# Patient Record
Sex: FEMALE | Race: ASIAN | NOT HISPANIC OR LATINO | Employment: FULL TIME | URBAN - METROPOLITAN AREA
[De-identification: names, ages, dates, MRNs, and addresses within clinical notes are randomized per-mention and may not be internally consistent; named-entity substitution may affect disease eponyms.]

---

## 2018-04-13 LAB — PAP: NORMAL

## 2018-10-19 ENCOUNTER — TELEPHONE (OUTPATIENT)
Dept: OBGYN | Facility: CLINIC | Age: 28
End: 2018-10-19

## 2018-10-19 NOTE — TELEPHONE ENCOUNTER
Spoke to Hakeem who is pregnant with first pregnancy,using LMP of 9/20/18 about 4 wks GA today. She had some pink vaginal spots 2 days ago,nothing further.   Reassurance given and bleeding /pain precautions reviewed and when to call triage or when to go to ED. Also had questions about exercise and PNV. Pt indicated understanding and agreed with plan.

## 2018-11-16 ENCOUNTER — OFFICE VISIT (OUTPATIENT)
Dept: OBGYN | Facility: CLINIC | Age: 28
End: 2018-11-16
Attending: ADVANCED PRACTICE MIDWIFE
Payer: COMMERCIAL

## 2018-11-16 ENCOUNTER — RADIANT APPOINTMENT (OUTPATIENT)
Dept: ULTRASOUND IMAGING | Facility: CLINIC | Age: 28
End: 2018-11-16
Attending: ADVANCED PRACTICE MIDWIFE
Payer: COMMERCIAL

## 2018-11-16 VITALS
WEIGHT: 138.7 LBS | DIASTOLIC BLOOD PRESSURE: 69 MMHG | SYSTOLIC BLOOD PRESSURE: 103 MMHG | HEART RATE: 77 BPM | BODY MASS INDEX: 23.68 KG/M2 | HEIGHT: 64 IN

## 2018-11-16 DIAGNOSIS — Z34.00 SUPERVISION OF NORMAL FIRST PREGNANCY, ANTEPARTUM: ICD-10-CM

## 2018-11-16 DIAGNOSIS — Z34.00 SUPERVISION OF NORMAL FIRST PREGNANCY, ANTEPARTUM: Primary | ICD-10-CM

## 2018-11-16 LAB
ABO + RH BLD: NORMAL
ABO + RH BLD: NORMAL
BLD GP AB SCN SERPL QL: NORMAL
BLOOD BANK CMNT PATIENT-IMP: NORMAL
ERYTHROCYTE [DISTWIDTH] IN BLOOD BY AUTOMATED COUNT: 12.4 % (ref 10–15)
HCT VFR BLD AUTO: 40.8 % (ref 35–47)
HGB BLD-MCNC: 13.3 G/DL (ref 11.7–15.7)
MCH RBC QN AUTO: 30.2 PG (ref 26.5–33)
MCHC RBC AUTO-ENTMCNC: 32.6 G/DL (ref 31.5–36.5)
MCV RBC AUTO: 93 FL (ref 78–100)
PLATELET # BLD AUTO: 220 10E9/L (ref 150–450)
RBC # BLD AUTO: 4.41 10E12/L (ref 3.8–5.2)
SPECIMEN EXP DATE BLD: NORMAL
WBC # BLD AUTO: 10 10E9/L (ref 4–11)

## 2018-11-16 PROCEDURE — 86780 TREPONEMA PALLIDUM: CPT | Performed by: ADVANCED PRACTICE MIDWIFE

## 2018-11-16 PROCEDURE — 86762 RUBELLA ANTIBODY: CPT | Performed by: ADVANCED PRACTICE MIDWIFE

## 2018-11-16 PROCEDURE — 82306 VITAMIN D 25 HYDROXY: CPT | Performed by: ADVANCED PRACTICE MIDWIFE

## 2018-11-16 PROCEDURE — 87086 URINE CULTURE/COLONY COUNT: CPT | Performed by: ADVANCED PRACTICE MIDWIFE

## 2018-11-16 PROCEDURE — G0463 HOSPITAL OUTPT CLINIC VISIT: HCPCS | Mod: 25,ZF

## 2018-11-16 PROCEDURE — 86900 BLOOD TYPING SEROLOGIC ABO: CPT | Performed by: ADVANCED PRACTICE MIDWIFE

## 2018-11-16 PROCEDURE — 87340 HEPATITIS B SURFACE AG IA: CPT | Performed by: ADVANCED PRACTICE MIDWIFE

## 2018-11-16 PROCEDURE — 87389 HIV-1 AG W/HIV-1&-2 AB AG IA: CPT | Performed by: ADVANCED PRACTICE MIDWIFE

## 2018-11-16 PROCEDURE — 86901 BLOOD TYPING SEROLOGIC RH(D): CPT | Performed by: ADVANCED PRACTICE MIDWIFE

## 2018-11-16 PROCEDURE — 36415 COLL VENOUS BLD VENIPUNCTURE: CPT | Performed by: ADVANCED PRACTICE MIDWIFE

## 2018-11-16 PROCEDURE — 85027 COMPLETE CBC AUTOMATED: CPT | Performed by: ADVANCED PRACTICE MIDWIFE

## 2018-11-16 PROCEDURE — 86850 RBC ANTIBODY SCREEN: CPT | Performed by: ADVANCED PRACTICE MIDWIFE

## 2018-11-16 PROCEDURE — 76817 TRANSVAGINAL US OBSTETRIC: CPT

## 2018-11-16 PROCEDURE — 86706 HEP B SURFACE ANTIBODY: CPT | Performed by: ADVANCED PRACTICE MIDWIFE

## 2018-11-16 RX ORDER — PRENATAL VIT/IRON FUM/FOLIC AC 27MG-0.8MG
1 TABLET ORAL DAILY
COMMUNITY
End: 2022-05-31

## 2018-11-16 NOTE — PROGRESS NOTES
"[facility] OB Intake note  Subjective   28 year old woman presents to clinic for initiation of OB care.  Patient's last menstrual period was 2018.  at 8w1d. Estimated Date of Delivery: 2019 based on LMP, ultrasound today confirms.  Reviewed dating ultrasound. Pregnancy is planned.      Symptoms since LMP include sensitivity to smell. Light spotting \"2 drops\" around 4 weeks.  Patient has tried these relief measures: none.    Caffeine intake: none  Tobacco, EtOH, Drug use: none  R/f TORCH exposure: works as , no pets.   Dietary restrictions: none    Last pap smear in [place] 3/2018 - NIL    - Genetic/Infection questionnaire completed, risks include: None identified  Have you traveled during the pregnancy?No  Have your sexual partner(s) travelled during the pregnancy?No    - Current Medications    Current Outpatient Prescriptions   Medication Sig Dispense Refill     Prenatal Vit-Fe Fumarate-FA (PRENATAL MULTIVITAMIN PLUS IRON) 27-0.8 MG TABS per tablet Take 1 tablet by mouth daily           - Co-morbids  History reviewed. No pertinent past medical history.  - Risk for GDM -  does not Personal history of GDM, BMI>30, h/o prediabetes/glucose intolerance, first degree relative with GDM or DM    WILL NOT have an early GCT and possible Hgb A1C    - The patient does not h/o Pre Eclampsia, Current multi fetal gestation, Pre Gestational Diabetes (Type 1 or Type 2), chronic hypertension, renal disease, Autoimmune disease (systematic lupus erythematosus, antiphospholipid syndrome) so WILL NOT start low dose aspirin (81mg) starting between 12 and 28 weeks to prevent preeclampsia.    - The patient  does not have a history of spontaneous  birth so  WILL NOT consider progesterone starting at 16-20 weeks and/or serial transvaginal cervical length ultrasounds from 16-24 weeks.         PERSONAL/SOCIAL HISTORY    lives with their spouse.  Employment: Full time as a .  Her job " involves sedentary activity.  Additional items: None    Objective  -VS: reviewed and within normal limits   -General appearance: no acute distress, patient is comfortable   NEUROLOGICAL/PSYCHIATRIC   - Orientated x3,   -Mood and affect: : normal     Assessment/Plan  Hakeem was seen today for prenatal care.    Diagnoses and all orders for this visit:    Supervision of normal first pregnancy, antepartum  -     ABO/Rh type and screen  -     Hepatitis B surface antigen  -     CBC with platelets  -     HIV Antigen Antibody Combo  -     Rubella Antibody IgG Quantitative  -     Treponema Abs w Reflex to RPR and Titer  -     Urine Culture Aerobic Bacterial  -     Vitamin D Deficiency  -     Hepatitis B Surface Antibody  -     MAT FETAL MED CTR REFERRAL-PREGNANCY      28 year old  8w1d weeks of pregnancy with DACIA of 2019 by LMP of Patient's last menstrual period was 2018.. Ultrasound confirms.   Outpatient Encounter Prescriptions as of 2018   Medication Sig Dispense Refill     Prenatal Vit-Fe Fumarate-FA (PRENATAL MULTIVITAMIN PLUS IRON) 27-0.8 MG TABS per tablet Take 1 tablet by mouth daily       No facility-administered encounter medications on file as of 2018.       Orders Placed This Encounter   Procedures     Hepatitis B surface antigen     CBC with platelets     HIV Antigen Antibody Combo     Rubella Antibody IgG Quantitative     Treponema Abs w Reflex to RPR and Titer     Vitamin D Deficiency     Hepatitis B Surface Antibody     MAT FETAL MED CTR REFERRAL-PREGNANCY     ABO/Rh type and screen       - Oriented to Practice, types of care, and how to reach a provider.  Undecided HAKEEM vs MD.  - Patient received 1st trimester new OB education packet complete with aide of The Expectant Family booklet including information on genetic screening test options.  - Patient desires 1st trimester screening which was ordered.  - Patient was encouraged to start prenatal vitamins as tolerated.    - Patient  was sent to lab for routine OB labs including as above.    - Pregnancy concerns to be addressed by provider at new OB exam include: none.    Pt to RTO for NOB visit in 2 weeks and prn if questions or concerns    MAMADOU Glaser CNM

## 2018-11-16 NOTE — LETTER
Date:November 20, 2018      Patient was self referred, no letter generated. Do not send.        [facility] Information

## 2018-11-16 NOTE — LETTER
"2018       RE: Hakeem Meade  [address] [place] [address]     Dear Colleague,    Thank you for referring your patient, Hakeem Meade, to the [facility] at [facility]. Please see a copy of my visit note below.    [facility] OB Intake note  Subjective   28 year old woman presents to clinic for initiation of OB care.  Patient's last menstrual period was 2018.  at 8w1d. Estimated Date of Delivery: 2019 based on LMP, ultrasound today confirms.  Reviewed dating ultrasound. Pregnancy is planned.      Symptoms since LMP include sensitivity to smell. Light spotting \"2 drops\" around 4 weeks.  Patient has tried these relief measures: none.    Caffeine intake: none  Tobacco, EtOH, Drug use: none  R/f TORCH exposure: works as , no pets.   Dietary restrictions: none    Last pap smear in [place] 3/2018 - NIL    - Genetic/Infection questionnaire completed, risks include: None identified  Have you traveled during the pregnancy?No  Have your sexual partner(s) travelled during the pregnancy?No    - Current Medications    Current Outpatient Prescriptions   Medication Sig Dispense Refill     Prenatal Vit-Fe Fumarate-FA (PRENATAL MULTIVITAMIN PLUS IRON) 27-0.8 MG TABS per tablet Take 1 tablet by mouth daily           - Co-morbids  History reviewed. No pertinent past medical history.  - Risk for GDM -  does not Personal history of GDM, BMI>30, h/o prediabetes/glucose intolerance, first degree relative with GDM or DM    WILL NOT have an early GCT and possible Hgb A1C    - The patient does not h/o Pre Eclampsia, Current multi fetal gestation, Pre Gestational Diabetes (Type 1 or Type 2), chronic hypertension, renal disease, Autoimmune disease (systematic lupus erythematosus, antiphospholipid syndrome) so WILL NOT start low dose aspirin (81mg) starting between 12 and 28 weeks to prevent preeclampsia.    - The patient  does not have a history of " spontaneous  birth so  WILL NOT consider progesterone starting at 16-20 weeks and/or serial transvaginal cervical length ultrasounds from 16-24 weeks.         PERSONAL/SOCIAL HISTORY    lives with their spouse.  Employment: Full time as a .  Her job involves sedentary activity.  Additional items: None    Objective  -VS: reviewed and within normal limits   -General appearance: no acute distress, patient is comfortable   NEUROLOGICAL/PSYCHIATRIC   - Orientated x3,   -Mood and affect: : normal     Assessment/Plan  Hakeem was seen today for prenatal care.    Diagnoses and all orders for this visit:    Supervision of normal first pregnancy, antepartum  -     ABO/Rh type and screen  -     Hepatitis B surface antigen  -     CBC with platelets  -     HIV Antigen Antibody Combo  -     Rubella Antibody IgG Quantitative  -     Treponema Abs w Reflex to RPR and Titer  -     Urine Culture Aerobic Bacterial  -     Vitamin D Deficiency  -     Hepatitis B Surface Antibody  -     MAT FETAL MED CTR REFERRAL-PREGNANCY      28 year old  8w1d weeks of pregnancy with DACIA of 2019 by LMP of Patient's last menstrual period was 2018.. Ultrasound confirms.   Outpatient Encounter Prescriptions as of 2018   Medication Sig Dispense Refill     Prenatal Vit-Fe Fumarate-FA (PRENATAL MULTIVITAMIN PLUS IRON) 27-0.8 MG TABS per tablet Take 1 tablet by mouth daily       No facility-administered encounter medications on file as of 2018.       Orders Placed This Encounter   Procedures     Hepatitis B surface antigen     CBC with platelets     HIV Antigen Antibody Combo     Rubella Antibody IgG Quantitative     Treponema Abs w Reflex to RPR and Titer     Vitamin D Deficiency     Hepatitis B Surface Antibody     MAT FETAL MED CTR REFERRAL-PREGNANCY     ABO/Rh type and screen       - Oriented to Practice, types of care, and how to reach a provider.  Undecided CNROBERTO vs MD.  - Patient received 1st trimester  new OB education packet complete with aide of The Expectant Family booklet including information on genetic screening test options.  - Patient desires 1st trimester screening which was ordered.  - Patient was encouraged to start prenatal vitamins as tolerated.    - Patient was sent to lab for routine OB labs including as above.    - Pregnancy concerns to be addressed by provider at new OB exam include: none.    Pt to RTO for NOB visit in 2 weeks and prn if questions or concerns    MAMADOU Glaser CNM            Again, thank you for allowing me to participate in the care of your patient.      Sincerely,    MAMADOU Glaser CNM

## 2018-11-16 NOTE — MR AVS SNAPSHOT
After Visit Summary   11/16/2018    Hakeem Meade    MRN: [id number]           Patient Information     Date Of Birth          1990        Visit Information        Provider Department      11/16/2018 2:15 PM Celeste Bowers APRN CN [facility]        Today's Diagnoses     Supervision of normal first pregnancy, antepartum    -  1       Follow-ups after your visit        Additional Services     MAT FETAL MED CTR REFERRAL-PREGNANCY       Body mass index is 23.68 kg/(m^2).    >> Patient may proceed with recommendations for further testing as directed by the Maternal Fetal Medicine Specialist >>    >> If requesting Fetal Echo: MFM will determine appropriate location for exam due to indication.    >> If requesting Lung Maturity Amnio:  If results indicate fetal lung maturity, induction or C/S is recommended within 36 hours.  Please schedule accordingly.     Dear Patient:   Please be aware that coverage of these services is subject to the terms and limitations of your health insurance plan.  Call member services at your health plan with any benefit or coverage questions.      Please bring the following to your appointment:    >>  Any x-rays, CTs or MRIs which have been performed.  Contact the facility where they were done to arrange for  prior to your scheduled appointment.  Any new CT, MRI or other procedures ordered by your specialist must be performed at a [facility] facility or coordinated by your clinic's referral office.  >>  List of current medications   >>  This referral request   >>  Any documents/labs given to you for this referral                  Follow-up notes from your care team     Return in about 2 weeks (around 11/30/2018) for NOB.      Your next 10 appointments already scheduled     Nov 30, 2018  1:00 PM CST   NEW OB with Ijeoma Parson CNM   [facility] ([facility])    [place] [facility] [address]  [facility]  [address]  [address]  [facility] [address]   [phone]            Dec 21, 2018 12:45 PM CST   Genetic Counseling with UR GEN COUNSELOR 2   [facility] [place] ([facility])    [address]  [facility] [address]   [phone]            Dec 21, 2018  1:30 PM CST   MFM NUCHAL TRANSLUCENCY with [id number]   [facility] Ul[facility] - [facility])    [address]  [facility] [address]   [phone]            Dec 21, 2018  2:00 PM CST   Radiology MD with AMARIS LAGUNAS MD   [facility])    [address]  [facility] [address]   [phone]           Please arrive at the time given for your first appointment. This visit is used internally to schedule the physician's time during your ultrasound.            Jan 25, 2019  1:30 PM CST   M US COMP with [id number]   [facility]tras[facility] [place] ([facility])    [address]  [facility] [address] [phone]           Wear comfortable clothes and leave your valuables at home.            Jan 25, 2019  2:00 PM CST   Radiology MD with AMARIS LAGUNAS MD   [facility] ([facility])    [address]  [facility] [address]   [phone]           Please arrive at the time given for your first appointment. This visit is used internally to schedule the physician's time during your ultrasound.              Future tests that were ordered for you today     Open Future Orders        Priority Expected Expires Ordered    MFM Genetic Counseling Routine  11/16/2019 11/16/2018    Maternal Fetal Nuchal Translucency Routine  9/16/2019 11/16/2018    MFM US Comprehensive Single Routine  9/16/2019 11/16/2018            Who to  "contact     Please call your clinic at [phone] to:    Ask questions about your health    Make or cancel appointments    Discuss your medicines    Learn about your test results    Speak to your doctor            Additional Information About Your Visit        MyChart Information     RushFiles is an electronic gateway that provides easy, online access to your medical records. With RushFiles, you can request a clinic appointment, read your test results, renew a prescription or communicate with your care team.     To sign up for RushFiles visit the website at [email or web address]   You will be asked to enter the access code listed below, as well as some personal information. Please follow the directions to create your username and password.     Your access code is: [email or web address]  Expires: 2019 10:51 PM     Your access code will  in 90 days. If you need help or a new code, please contact your [facility] or call [phone] for assistance.        Care EveryWhere ID     This is your Care EveryWhere ID. This could be used by other organizations to access your [facility] medical records  [id number]        Your Vitals Were     Pulse Height Last Period BMI (Body Mass Index)          77 1.63 m (5' 4.17\") 2018 23.68 kg/m2         Blood Pressure from Last 3 Encounters:   18 103/69    Weight from Last 3 Encounters:   18 62.9 kg (138 lb 11.2 oz)              We Performed the Following     ABO/Rh type and screen     CBC with platelets     Hepatitis B Surface Antibody     Hepatitis B surface antigen     HIV Antigen Antibody Combo     MAT FETAL MED CTR REFERRAL-PREGNANCY     Rubella Antibody IgG Quantitative     Treponema Abs w Reflex to RPR and Titer     Urine Culture Aerobic Bacterial     Vitamin D Deficiency        Primary Care Provider    None Specified       No primary provider on file.        Equal Access to Services     HILARY DALY AH: Maria Del Carmen byers " Jaquelin, peeweeoriana gunterkaycee, andrew kadwaine diaz, anuel pradhanmarivel shay. So [facility] [phone].    ATENCIÓN: Si habla slick, tiene a barrera disposición servicios gratuitos de asistencia lingüística. Ruddy al [phone].    We comply with applicable federal civil rights laws and [place] laws. We do not discriminate on the basis of race, color, national origin, age, disability, sex, sexual orientation, or gender identity.            Thank you!     Thank you for choosing [facility]  for your care. Our goal is always to provide you with excellent care. Hearing back from our patients is one way we can continue to improve our services. Please take a few minutes to complete the written survey that you may receive in the mail after your visit with us. Thank you!             Your Updated Medication List - Protect others around you: Learn how to safely use, store and throw away your medicines at www.disposemymeds.org.          This list is accurate as of 11/16/18 10:51 PM.  Always use your most recent med list.                   Brand Name Dispense Instructions for use Diagnosis    prenatal multivitamin plus iron 27-0.8 MG Tabs per tablet      Take 1 tablet by mouth daily    Supervision of normal first pregnancy, antepartum

## 2018-11-17 LAB
BACTERIA SPEC CULT: NORMAL
Lab: NORMAL
RUBV IGG SERPL IA-ACNC: 68 IU/ML
SPECIMEN SOURCE: NORMAL
T PALLIDUM AB SER QL: NONREACTIVE

## 2018-11-19 LAB
DEPRECATED CALCIDIOL+CALCIFEROL SERPL-MC: 24 UG/L (ref 20–75)
HBV SURFACE AB SERPL IA-ACNC: 23.99 M[IU]/ML
HBV SURFACE AG SERPL QL IA: NONREACTIVE
HIV 1+2 AB+HIV1 P24 AG SERPL QL IA: NONREACTIVE

## 2018-11-30 ENCOUNTER — PRE VISIT (OUTPATIENT)
Dept: MATERNAL FETAL MEDICINE | Facility: CLINIC | Age: 28
End: 2018-11-30

## 2018-11-30 ENCOUNTER — OFFICE VISIT (OUTPATIENT)
Dept: MATERNAL FETAL MEDICINE | Facility: CLINIC | Age: 28
End: 2018-11-30
Attending: ADVANCED PRACTICE MIDWIFE
Payer: COMMERCIAL

## 2018-11-30 ENCOUNTER — OFFICE VISIT (OUTPATIENT)
Dept: OBGYN | Facility: CLINIC | Age: 28
End: 2018-11-30
Attending: ADVANCED PRACTICE MIDWIFE
Payer: COMMERCIAL

## 2018-11-30 ENCOUNTER — HOSPITAL ENCOUNTER (OUTPATIENT)
Dept: ULTRASOUND IMAGING | Facility: CLINIC | Age: 28
Discharge: HOME | End: 2018-11-30
Attending: ADVANCED PRACTICE MIDWIFE | Admitting: OBSTETRICS & GYNECOLOGY
Payer: COMMERCIAL

## 2018-11-30 ENCOUNTER — RADIANT APPOINTMENT (OUTPATIENT)
Dept: ULTRASOUND IMAGING | Facility: CLINIC | Age: 28
End: 2018-11-30
Attending: ADVANCED PRACTICE MIDWIFE
Payer: COMMERCIAL

## 2018-11-30 VITALS
HEIGHT: 64 IN | HEART RATE: 74 BPM | WEIGHT: 136.2 LBS | BODY MASS INDEX: 23.25 KG/M2 | SYSTOLIC BLOOD PRESSURE: 113 MMHG | DIASTOLIC BLOOD PRESSURE: 76 MMHG

## 2018-11-30 DIAGNOSIS — Z34.00 SUPERVISION OF NORMAL FIRST PREGNANCY, ANTEPARTUM: ICD-10-CM

## 2018-11-30 DIAGNOSIS — O26.90 PREGNANCY RELATED CONDITION, ANTEPARTUM: ICD-10-CM

## 2018-11-30 DIAGNOSIS — O35.8XX0 CYSTIC HYGROMA OF FETUS IN SINGLETON PREGNANCY: Primary | ICD-10-CM

## 2018-11-30 DIAGNOSIS — Z34.00 SUPERVISION OF NORMAL FIRST PREGNANCY, ANTEPARTUM: Primary | ICD-10-CM

## 2018-11-30 DIAGNOSIS — O28.3 ABNORMAL PRENATAL ULTRASOUND: Primary | ICD-10-CM

## 2018-11-30 DIAGNOSIS — O35.9XX0 SUSPECTED FETAL ANOMALY, ANTEPARTUM, SINGLE OR UNSPECIFIED FETUS: ICD-10-CM

## 2018-11-30 DIAGNOSIS — O28.3 ABNORMAL PRENATAL ULTRASOUND: ICD-10-CM

## 2018-11-30 PROCEDURE — 36415 COLL VENOUS BLD VENIPUNCTURE: CPT | Performed by: OBSTETRICS & GYNECOLOGY

## 2018-11-30 PROCEDURE — 87591 N.GONORRHOEAE DNA AMP PROB: CPT | Performed by: ADVANCED PRACTICE MIDWIFE

## 2018-11-30 PROCEDURE — 87491 CHLMYD TRACH DNA AMP PROBE: CPT | Performed by: ADVANCED PRACTICE MIDWIFE

## 2018-11-30 PROCEDURE — 76801 OB US < 14 WKS SINGLE FETUS: CPT

## 2018-11-30 PROCEDURE — G0463 HOSPITAL OUTPT CLINIC VISIT: HCPCS | Mod: ZF,25

## 2018-11-30 PROCEDURE — 96040 ZZH GENETIC COUNSELING, EACH 30 MINUTES: CPT | Mod: ZF,59 | Performed by: GENETIC COUNSELOR, MS

## 2018-11-30 PROCEDURE — 40000791 ZZHCL STATISTIC VERIFI PRENATAL TRISOMY 21,18,13: Performed by: OBSTETRICS & GYNECOLOGY

## 2018-11-30 ASSESSMENT — PATIENT HEALTH QUESTIONNAIRE - PHQ9
5. POOR APPETITE OR OVEREATING: NOT AT ALL
SUM OF ALL RESPONSES TO PHQ QUESTIONS 1-9: 3

## 2018-11-30 ASSESSMENT — ANXIETY QUESTIONNAIRES
GAD7 TOTAL SCORE: 0
1. FEELING NERVOUS, ANXIOUS, OR ON EDGE: NOT AT ALL
6. BECOMING EASILY ANNOYED OR IRRITABLE: NOT AT ALL
3. WORRYING TOO MUCH ABOUT DIFFERENT THINGS: NOT AT ALL
2. NOT BEING ABLE TO STOP OR CONTROL WORRYING: NOT AT ALL
7. FEELING AFRAID AS IF SOMETHING AWFUL MIGHT HAPPEN: NOT AT ALL
5. BEING SO RESTLESS THAT IT IS HARD TO SIT STILL: NOT AT ALL
IF YOU CHECKED OFF ANY PROBLEMS ON THIS QUESTIONNAIRE, HOW DIFFICULT HAVE THESE PROBLEMS MADE IT FOR YOU TO DO YOUR WORK, TAKE CARE OF THINGS AT HOME, OR GET ALONG WITH OTHER PEOPLE: NOT DIFFICULT AT ALL

## 2018-11-30 ASSESSMENT — PAIN SCALES - GENERAL: PAINLEVEL: NO PAIN (0)

## 2018-11-30 NOTE — MR AVS SNAPSHOT
After Visit Summary   11/30/2018    Hakeem Meade    MRN: [id number]           Patient Information     Date Of Birth          1990        Visit Information        Provider Department      11/30/2018 2:45 PM Jesusita Rogers,  [facility] [place]        Today's Diagnoses     Cystic hygroma of fetus in [place] pregnancy    -  1    Abnormal prenatal ultrasound           Follow-ups after your visit        Your next 10 appointments already scheduled     Dec 21, 2018  1:00 PM CST   RETURN OB with MAMADOU Caal CNM   [facility] ([facility])    [place] [facility]  [address]  [address]  [facility] [address]   [phone]            Dec 21, 2018  1:30 PM CST   MFM NUCHAL TRANSLUCENCY with [id number]   [facility] [place] ([facility])    [address]  [facility] [id number] [phone]            Dec 21, 2018  2:00 PM CST   Radiology MD with AMARIS LAGUNAS MD   [facility])    [address]  [facility] [address]   [phone]           Please arrive at the time given for your first appointment. This visit is used internally to schedule the physician's time during your ultrasound.            Jan 25, 2019  1:30 PM CST   MFM US COMP with [id number]   [facility] [place] ([facility])    [address]  [facility] [address] [phone]           Wear comfortable clothes and leave your valuables at home.            Jan 25, 2019  2:00 PM CST   Radiology MD with AMARIS LAGUNAS MD   [facility] [place] ([facility])    [address]  [facility] [address]   [phone]           Please arrive at the time given for your first  appointment. This visit is used internally to schedule the physician's time during your ultrasound.              Who to contact     If you have questions or need follow up information about today's clinic visit or your schedule please contact [facility] directly at [phone].  Normal or non-critical lab and imaging results will be communicated to you by MyChart, letter or phone within 4 business days after the clinic has received the results. If you do not hear from us within 7 days, please contact the clinic through [employer] or phone. If you have a critical or abnormal lab result, we will notify you by phone as soon as possible.  Submit refill requests through [employer] or call your pharmacy and they will forward the refill request to us. Please allow 3 business days for your refill to be completed.          Additional Information About Your Visit        MyChart Information     [employer] gives you secure access to your electronic health record. If you see a primary care provider, you can also send messages to your care team and make appointments. If you have questions, please call your primary care clinic.  If you do not have a primary care provider, please call [phone] and they will assist you.        Care EveryWhere ID     This is your Care EveryWhere ID. This could be used by other organizations to access your [facility] medical records  [id number]        Your Vitals Were     Last Period                   09/20/2018            Blood Pressure from Last 3 Encounters:   11/30/18 113/76   11/16/18 103/69    Weight from Last 3 Encounters:   11/30/18 61.8 kg (136 lb 3.2 oz)   11/16/18 62.9 kg (138 lb 11.2 oz)              We Performed the Following     Non Invasive Prenatal Test Cell Free DNA        Primary Care Provider    None Specified       No primary provider on file.        Equal Access to Services     HILARY DALY : jerrica Ledbetter qaybta  anuel morgankelsie day. So [facility] [phone].    ATENCIÓN: Si habla slick, tiene a barrera disposición servicios gratuitos de asistencia lingüística. Llame al [phone].    We comply with applicable federal civil rights laws and [place] laws. We do not discriminate on the basis of race, color, national origin, age, disability, sex, sexual orientation, or gender identity.            Thank you!     Thank you for choosing [facility]  for your care. Our goal is always to provide you with excellent care. Hearing back from our patients is one way we can continue to improve our services. Please take a few minutes to complete the written survey that you may receive in the mail after your visit with us. Thank you!             Your Updated Medication List - Protect others around you: Learn how to safely use, store and throw away your medicines at www.disposemymeds.org.          This list is accurate as of 11/30/18 11:59 PM.  Always use your most recent med list.                   Brand Name Dispense Instructions for use Diagnosis    prenatal multivitamin w/iron 27-0.8 MG tablet      Take 1 tablet by mouth daily    Supervision of normal first pregnancy, antepartum

## 2018-11-30 NOTE — PROGRESS NOTES
"SUBJECTIVE:   Hakeem is a 28 year old female who presents to clinic for a new OB visit.   at 10w1d with Estimated Date of Delivery: 2019 based on LMP, c/w dating ultrasound. Feels well. Has started PNV.     She has not had bleeding since her LMP.   She has had mild nausea. Weight loss has occurred, for a total of 2 pounds.   This was a planned pregnancy.   FOB is involved,   \"Lainey De Souza\"      ===========================================   ROS: 10 point ROS neg other than the symptoms noted above in the HPI.    PSYCHIATRIC:  Denies mood changes, difficulty concentrating, depression, anxiety, panic attacks or post partum depression  PHQ-9 score:    PHQ-9 SCORE 2018   PHQ-9 Total Score 3     SENDY-7 SCORE 2018   Total Score 0     Past History:  Her past medical history No past medical history on file..   This is her first pregnancy  Since her last LMP she denies use of alcohol, tobacco and street drugs.  HISTORY:  No family history on file.  Social History     Social History     Marital status:      Spouse name: Lainey De Souza     Number of children: N/A     Years of education: N/A     Social History Main Topics     Smoking status: Never Smoker     Smokeless tobacco: Never Used     Alcohol use No     Drug use: No     Sexual activity: Yes     Partners: Male     Birth control/ protection: None     Other Topics Concern     None     Social History Narrative     Current Outpatient Prescriptions   Medication Sig     Prenatal Vit-Fe Fumarate-FA (PRENATAL MULTIVITAMIN PLUS IRON) 27-0.8 MG TABS per tablet Take 1 tablet by mouth daily     No current facility-administered medications for this visit.      Allergies   Allergen Reactions     Cefradine [Cephradine]        ============================================  MEDICAL HISTORY  No past medical history on file.  No past surgical history on file.    Obstetric History       T0      L0     SAB0   TAB0   Ectopic0   Multiple0   Live " "Births0       # Outcome Date GA Lbr Maxime/2nd Weight Sex Delivery Anes PTL Lv   1 Current                     GYN History- Last pap 2018 NIL (no hpv as pt was <30)     Abnormal Pap Smears none                        Cervical procedures: none                        History of STI: none    I personally reviewed the past social/family/medical and surgical history on the date of service.   I reviewed lab work done at Intake visit with patient.    EXAM:  /76   Pulse 74   Ht 1.63 m (5' 4.17\")   Wt 61.8 kg (136 lb 3.2 oz)   LMP 2018   BMI 23.25 kg/m     EXAM:  GENERAL:  Pleasant pregnant female, alert, cooperative and well groomed.  SKIN:  Warm and dry, without lesions or rashes  HEAD: Symmetrical features.  MOUTH:  Buccal mucosa pink, moist without lesions.  Teeth in good repair.    NECK:  Thyroid without enlargement and nodules.  Lymph nodes not palpable.   LUNGS:  Clear to auscultation.  BREAST:    No dominant, fixed or suspicious masses are noted.  No skin or nipple changes or axillary nodes.   Nipples everted.      HEART:  RRR without murmur.  ABDOMEN: Soft without masses , tenderness or organomegaly.  No CVA tenderness.  Uterus palpable at size equal to dates.  No scars noted.. Unable to auscultate FHTs via abdomen- ultrasound ordered.  MUSCULOSKELETAL:  Full range of motion  EXTREMITIES:  No edema. No significant varicosities.   PELVIC EXAM: deferred  WET PREP:Not done  GC/CHLAMYDIA CULTURE OBTAINED:YES    Last pap 2018 NIL    ASSESSMENT:  28 year old , 10w1d weeks of pregnancy with DACIA of 2019 by LMP  Intrauterine pregnancy 10w1d size consistent with dates  Genetic Screening: First Trimester Screen      ICD-10-CM    1. Supervision of normal first pregnancy - undecided HAKEEM romero MD Z34.00 Chlamydia PCR (Clinic Collect)     Gonorrhea PCR     [facility] REFERRAL-PREGNANCY     CANCELED: US OB >14 Weeks Limited wo Fetal Measurement     PLAN:  - Reviewed use of triage nurse line " and contacting the on-call provider after hours for an urgent need such as fever, vagina bleeding, bladder or vaginal infection, rupture of membranes,  or term labor.    - Reviewed best evidence for: weight gain for her weight and height for pregnancy:  Based on pre-pregnancy weight and Body mass index is 23.25 kg/(m^2). RECOMMENDED WEIGHT GAIN: 25-35 lbs.  - Reviewed healthy diet and foods to avoid; exercise and activity during pregnancy; avoiding exposure to toxoplasmosis; and maintenance of a generally healthy lifestyle.   - Discussed the harms, benefits, side effects and alternative therapies for current prescribed and OTC medications.    - All pt's and partner's questions discussed and answered.  Pt verbalized understanding of and agreement to plan of care.     - Reviewed OBI labs.  - Recommended vitamin D supplementation 1419-2070 international unit(s)/day.   - GC/CT collected.  - Pap up to date. Next with HPV cotesting scheduled for 2021.   - 1st trimester screening scheduled for 18.  - Level 2 ultrasound scheduled for 18.    *- Unable to auscultate FHTs via doppler. Scheduled for ultrasound after appointment.     - Continue scheduled prenatal care, RTC in 4 weeks and prn if questions or concerns    MAMADOU Machado, CNM    ADDENDUM:     Ultrasound:  CRL = 40.7 mm = 11 0/7 weeks  EGA.                             Fetal anatomy appears abnormal for gestational age, loculated fluid collection seen behind the neck extending to fetal torso, possible cystic hygroma.                           Fetal/Fetal Cardiac Activity: Present.  FHR = 176bpm.                           Implantation: Intrauterine.                           Cervix = 4.5 cm                            Maternal structures appear normal.     Impression:  IUP with +CA.  Abnormal appearance on posterior neck/thorax.  Recommend MFM consultation.     Ultrasound performed; noted abnormal finding, likely cystic hygroma.  Immediately  consulted with Dr. Rogers from Maternal Fetal Medicine who recommended pt present to [facility] department now for evaluation.    Reviewed abnormal findings and Dr. Rogers's findings with patient and .  Patient verbalized understanding and agreed with plan of care.  Pt and  to go [facility] appointment now.    MAMADOU Machado, HAKEEM

## 2018-11-30 NOTE — PROGRESS NOTES
I met with Hakeem and her partner today at the request of Dr. Jesusita Rogers due to the abnormalities seen on ultrasound today. Hakeem completed an ultrasound with OB provider earlier today which noted a concern for a large cystic hygroma. A first trimester ultrasound was completed at [facility] today which confirmed the cystic hygroma. These results were reviewed in detail by Dr. Rogers today. Hakeem currently declines [facility] and would like to proceed with non-invasive prenatal screening via [employer] with [employer]. She will be informed of these results in approximately 7-10 days.      Hakeem and her partner had questions regarding follow-up ultrasounds to further assess the cystic hygroma. She is scheduled for an NT ultrasound on 12/21 and everything will be reassessed at that time. They also had questions regarding pregnancy termination, however, they did not want to complete a WRTK 24-hour consent today.    Plan: Haekem will be informed of her NIPT results when available.    Time spent: 20 minutes     Simi Sherwood MS, [facility]  Maternal Fetal Medicine  [facility]  Ph: [phone]  [email or web address]

## 2018-11-30 NOTE — LETTER
"2018       RE: Hakeem Meade  [address] [place] [address]     Dear Colleague,    Thank you for referring your patient, Hakeem Meade, to the [facility] at [facility]. Please see a copy of my visit note below.    SUBJECTIVE:   Hakeem is a 28 year old female who presents to clinic for a new OB visit.   at 10w1d with Estimated Date of Delivery: 2019 based on LMP, c/w dating ultrasound. Feels well. Has started PNV.     She has not had bleeding since her LMP.   She has had mild nausea. Weight loss has occurred, for a total of 2 pounds.   This was a planned pregnancy.   FOB is involved,   \"Lainey De Souza\"      ===========================================   ROS: 10 point ROS neg other than the symptoms noted above in the HPI.    PSYCHIATRIC:  Denies mood changes, difficulty concentrating, depression, anxiety, panic attacks or post partum depression  PHQ-9 score:    PHQ-9 SCORE 2018   PHQ-9 Total Score 3     SENDY-7 SCORE 2018   Total Score 0     Past History:  Her past medical history No past medical history on file..   This is her first pregnancy  Since her last LMP she denies use of alcohol, tobacco and street drugs.  HISTORY:  No family history on file.  Social History     Social History     Marital status:      Spouse name: Lainey De Souza     Number of children: N/A     Years of education: N/A     Social History Main Topics     Smoking status: Never Smoker     Smokeless tobacco: Never Used     Alcohol use No     Drug use: No     Sexual activity: Yes     Partners: Male     Birth control/ protection: None     Other Topics Concern     None     Social History Narrative     Current Outpatient Prescriptions   Medication Sig     Prenatal Vit-Fe Fumarate-FA (PRENATAL MULTIVITAMIN PLUS IRON) 27-0.8 MG TABS per tablet Take 1 tablet by mouth daily     No current facility-administered medications for this visit.      Allergies " "  Allergen Reactions     Cefradine [Cephradine]        ============================================  MEDICAL HISTORY  No past medical history on file.  No past surgical history on file.    Obstetric History       T0      L0     SAB0   TAB0   Ectopic0   Multiple0   Live Births0       # Outcome Date GA Lbr Maxime/2nd Weight Sex Delivery Anes PTL Lv   1 Current                     GYN History- Last pap 2018 NIL (no hpv as pt was <30)     Abnormal Pap Smears none                        Cervical procedures: none                        History of STI: none    I personally reviewed the past social/family/medical and surgical history on the date of service.   I reviewed lab work done at Intake visit with patient.    EXAM:  /76   Pulse 74   Ht 1.63 m (5' 4.17\")   Wt 61.8 kg (136 lb 3.2 oz)   LMP 2018   BMI 23.25 kg/m      EXAM:  GENERAL:  Pleasant pregnant female, alert, cooperative and well groomed.  SKIN:  Warm and dry, without lesions or rashes  HEAD: Symmetrical features.  MOUTH:  Buccal mucosa pink, moist without lesions.  Teeth in good repair.    NECK:  Thyroid without enlargement and nodules.  Lymph nodes not palpable.   LUNGS:  Clear to auscultation.  BREAST:    No dominant, fixed or suspicious masses are noted.  No skin or nipple changes or axillary nodes.   Nipples everted.      HEART:  RRR without murmur.  ABDOMEN: Soft without masses , tenderness or organomegaly.  No CVA tenderness.  Uterus palpable at size equal to dates.  No scars noted.. Unable to auscultate FHTs via abdomen- ultrasound ordered.  MUSCULOSKELETAL:  Full range of motion  EXTREMITIES:  No edema. No significant varicosities.   PELVIC EXAM: deferred  WET PREP:Not done  GC/CHLAMYDIA CULTURE OBTAINED:YES    Last pap 2018 NIL    ASSESSMENT:  28 year old , 10w1d weeks of pregnancy with DACIA of 2019 by LMP  Intrauterine pregnancy 10w1d size consistent with dates  Genetic Screening: First Trimester " Screen      ICD-10-CM    1. Supervision of normal first pregnancy - undecided HAKEEM v MD Z34.00 Chlamydia PCR (Clinic Collect)     Gonorrhea PCR     [facility] FETAL MED CTR REFERRAL-PREGNANCY     CANCELED: US OB >14 Weeks Limited wo Fetal Measurement     PLAN:  - Reviewed use of triage nurse line and contacting the on-call provider after hours for an urgent need such as fever, vagina bleeding, bladder or vaginal infection, rupture of membranes,  or term labor.    - Reviewed best evidence for: weight gain for her weight and height for pregnancy:  Based on pre-pregnancy weight and Body mass index is 23.25 kg/(m^2). RECOMMENDED WEIGHT GAIN: 25-35 lbs.  - Reviewed healthy diet and foods to avoid; exercise and activity during pregnancy; avoiding exposure to toxoplasmosis; and maintenance of a generally healthy lifestyle.   - Discussed the harms, benefits, side effects and alternative therapies for current prescribed and OTC medications.    - All pt's and partner's questions discussed and answered.  Pt verbalized understanding of and agreement to plan of care.     - Reviewed OBI labs.  - Recommended vitamin D supplementation 2567-1732 international unit(s)/day.   - GC/CT collected.  - Pap up to date. Next with HPV cotesting scheduled for 2021.   - 1st trimester screening scheduled for 18.  - Level 2 ultrasound scheduled for 18.    *- Unable to auscultate FHTs via doppler. Scheduled for ultrasound after appointment.     - Continue scheduled prenatal care, RTC in 4 weeks and prn if questions or concerns    MAMADOU Machado, HAKEEM    ADDENDUM:     Ultrasound:  CRL = 40.7 mm = 11 0/7 weeks  EGA.                             Fetal anatomy appears abnormal for gestational age, loculated fluid collection seen behind the neck extending to fetal torso, possible cystic hygroma.                           Fetal/Fetal Cardiac Activity: Present.  FHR = 176bpm.                           Implantation:  Intrauterine.                           Cervix = 4.5 cm                            Maternal structures appear normal.     Impression:  IUP with +CA.  Abnormal appearance on posterior neck/thorax.  Recommend [facility] consultation.     Ultrasound performed; noted abnormal finding, likely cystic hygroma.  Immediately consulted with Dr. Rogers from Maternal Fetal Medicine who recommended pt present to [facility] department now for evaluation.    Reviewed abnormal findings and Dr. Rogers's findings with patient and .  Patient verbalized understanding and agreed with plan of care.  Pt and  to go [facility] appointment now.    MAMADOU Machado CNM          Again, thank you for allowing me to participate in the care of your patient.      Sincerely,    Ijeoma Parson CNM

## 2018-11-30 NOTE — LETTER
Date:December 18, 2018      Patient was self referred, no letter generated. Do not send.        [facility] Health Information

## 2018-11-30 NOTE — PROGRESS NOTES
"Please see \"Imaging\" tab under \"Chart Review\" for details of today's US.      Jesusita Rogers, DO  Maternal-Fetal Medicine        "

## 2018-11-30 NOTE — MR AVS SNAPSHOT
After Visit Summary   11/30/2018    Hakeem Meade    MRN: [id number]           Patient Information     Date Of Birth          1990        Visit Information        Provider Department      11/30/2018 11:00 AM UR GEN COUNSELOR 1 [facility] [place]        Today's Diagnoses     Abnormal prenatal ultrasound    -  1       Follow-ups after your visit        Your next 10 appointments already scheduled     Dec 21, 2018  1:00 PM CST   RETURN OB with MAMADOU Caal CNM   [facility] ([facility])    [facility] [address]  [address]  [facility] [address] [phone]            Dec 21, 2018  1:30 PM CST   MFM NUCHAL TRANSLUCENCY with [id number]   [facility])    [address]  [facility] [id number] [phone]            Dec 21, 2018  2:00 PM CST   Radiology MD with AMARIS LAGUNAS MD   [facility])    [address]  [facility] [address]   [phone]           Please arrive at the time given for your first appointment. This visit is used internally to schedule the physician's time during your ultrasound.            Jan 25, 2019  1:30 PM CST   MFM US COMP with [id number]   [facility] [place] ([facility])    [address]  [facility] [id number] [phone]           Wear comfortable clothes and leave your valuables at home.            Jan 25, 2019  2:00 PM CST   Radiology MD with AMARIS LAGUNAS MD   [facility] ([facility])    [address]  [facility] [address] [phone]           Please arrive at the time given for your first appointment. This visit is used internally to schedule  the physician's time during your ultrasound.              Future tests that were ordered for you today     Open Future Orders        Priority Expected Expires Ordered    MFM US OB Complete 1st Tri Single Routine  9/30/2019 11/30/2018            Who to contact     If you have questions or need follow up information about today's clinic visit or your schedule please contact [facility] directly at [phone].  Normal or non-critical lab and imaging results will be communicated to you by TripsByTipshart, letter or phone within 4 business days after the clinic has received the results. If you do not hear from us within 7 days, please contact the clinic through [employer] or phone. If you have a critical or abnormal lab result, we will notify you by phone as soon as possible.  Submit refill requests through [employer] or call your pharmacy and they will forward the refill request to us. Please allow 3 business days for your refill to be completed.          Additional Information About Your Visit        TripsByTipshart Information     [employer] gives you secure access to your electronic health record. If you see a primary care provider, you can also send messages to your care team and make appointments. If you have questions, please call your primary care clinic.  If you do not have a primary care provider, please call [phone] and they will assist you.        Care EveryWhere ID     This is your Care EveryWhere ID. This could be used by other organizations to access your [place] medical records  [id number]        Your Vitals Were     Last Period                   09/20/2018            Blood Pressure from Last 3 Encounters:   11/30/18 113/76   11/16/18 103/69    Weight from Last 3 Encounters:   11/30/18 61.8 kg (136 lb 3.2 oz)   11/16/18 62.9 kg (138 lb 11.2 oz)               Primary Care Provider    None Specified       No primary provider on file.        Equal Access to Services     HILARY KIRBY: Hadii  john Hammer, jerrica gunterchristineha, qaclauta kadwaine adamfreddy, waxlorena perri boltonanoopjada pérez shay. So [facility] [phone].    ATENCIÓN: Si habla español, tiene a barrera disposición servicios gratuitos de asistencia lingüística. Llame al [phone].    We comply with applicable federal civil rights laws and [place] laws. We do not discriminate on the basis of race, color, national origin, age, disability, sex, sexual orientation, or gender identity.            Thank you!     Thank you for choosing [facility]  for your care. Our goal is always to provide you with excellent care. Hearing back from our patients is one way we can continue to improve our services. Please take a few minutes to complete the written survey that you may receive in the mail after your visit with us. Thank you!             Your Updated Medication List - Protect others around you: Learn how to safely use, store and throw away your medicines at www.disposemymeds.org.          This list is accurate as of 11/30/18  4:20 PM.  Always use your most recent med list.                   Brand Name Dispense Instructions for use Diagnosis    prenatal multivitamin w/iron 27-0.8 MG tablet      Take 1 tablet by mouth daily    Supervision of normal first pregnancy, antepartum

## 2018-12-01 ASSESSMENT — ANXIETY QUESTIONNAIRES: GAD7 TOTAL SCORE: 0

## 2018-12-02 LAB
C TRACH DNA SPEC QL NAA+PROBE: NEGATIVE
N GONORRHOEA DNA SPEC QL NAA+PROBE: NEGATIVE
SPECIMEN SOURCE: NORMAL
SPECIMEN SOURCE: NORMAL

## 2018-12-10 ENCOUNTER — TELEPHONE (OUTPATIENT)
Dept: MATERNAL FETAL MEDICINE | Facility: CLINIC | Age: 28
End: 2018-12-10

## 2018-12-10 LAB — LAB SCANNED RESULT: ABNORMAL

## 2018-12-10 NOTE — TELEPHONE ENCOUNTER
"12/10/2018    I called Hakeem to discuss her abnormal \"Innatal\" cell-free fetal DNA screening results.  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 The positive predictive value quoted on the result is 9%, however, given the ultrasound finding of a large septated cystic hygroma, the likelihood of Monosomy X in this pregnancy is much higher than 9%.     The results are normal for chromosomes 21, 31, and 18.      We discussed the possible explanations for a positive NIPT result for monosomy X including:     This result could represent a false positive and her pregnancy may not be affected by monosomy X.     This result could represent a true positive.     Other explanations can include placental, maternal, or fetal mosaicism for other chromosome anomalies.     NIPT does not replace the accuracy obtained from invasive prenatal testing such as chorionic villus sampling and amniocentesis.    Hakeem asked very appropriate questions regarding this result and next steps. She was previously, and still is, scheduled for a first trimester ultrasound on 12/21/18. This ultrasound will allow for further assessment of baby but will not be able to determine if the baby has monosomy X. Hakeem understands that the only way to obtain more accurate results regarding monosomy X is via invasive testing. She could consider chorionic villus sampling (CVS) prior to 14 weeks gestation. The limitations with CVS performed for monosomy X analysis were reviewed with Hakeem today due to the higher incidence of confined placental mosaicism in monosomy X cases. We also discussed the option of amniocentesis in the second trimester for confirmatory testing.     Hakeem asked further questions regarding pregnancy termination. The timing, procedures, and locations of D&C, D&E, and induction of labor were reviewed. Per her request, information regarding these procedures were sent to her via e-mail. Hakeem understands that confirmatory testing would " not be required if she desires pregnancy termination due to ultrasound findings. She wishes to discuss this information with her family. She is currently thinking she will complete the ultrasound on 12/21/18 prior to making any further decisions. Hakeem is encouraged to contact me with questions.     Plan:  First trimester ultrasound on 12/21/18 at [facility]. She may desire pregnancy termination or scheduling of diagnostic testing at that time.    Simi Sherwood MS, [facility]  Licensed Genetic Counselor  Phone: [phone]  Pager: [phone]

## 2018-12-13 PROBLEM — O35.9XX0 SUSPECTED FETAL ANOMALY, ANTEPARTUM: Status: ACTIVE | Noted: 2018-12-13

## 2018-12-21 ENCOUNTER — OFFICE VISIT (OUTPATIENT)
Dept: MATERNAL FETAL MEDICINE | Facility: CLINIC | Age: 28
End: 2018-12-21
Attending: OBSTETRICS & GYNECOLOGY
Payer: COMMERCIAL

## 2018-12-21 ENCOUNTER — HOSPITAL ENCOUNTER (OUTPATIENT)
Dept: ULTRASOUND IMAGING | Facility: CLINIC | Age: 28
Discharge: HOME | End: 2018-12-21
Attending: ADVANCED PRACTICE MIDWIFE | Admitting: OBSTETRICS & GYNECOLOGY
Payer: COMMERCIAL

## 2018-12-21 ENCOUNTER — OFFICE VISIT (OUTPATIENT)
Dept: OBGYN | Facility: CLINIC | Age: 28
End: 2018-12-21
Attending: ADVANCED PRACTICE MIDWIFE
Payer: COMMERCIAL

## 2018-12-21 ENCOUNTER — OFFICE VISIT (OUTPATIENT)
Dept: MATERNAL FETAL MEDICINE | Facility: CLINIC | Age: 28
End: 2018-12-21
Attending: ADVANCED PRACTICE MIDWIFE
Payer: COMMERCIAL

## 2018-12-21 VITALS
DIASTOLIC BLOOD PRESSURE: 78 MMHG | BODY MASS INDEX: 23.25 KG/M2 | WEIGHT: 136.2 LBS | SYSTOLIC BLOOD PRESSURE: 117 MMHG | HEART RATE: 80 BPM | HEIGHT: 64 IN

## 2018-12-21 DIAGNOSIS — O28.5 ABNORMAL CHROMOSOMAL AND GENETIC FINDING ON ANTENATAL SCREENING OF MOTHER: ICD-10-CM

## 2018-12-21 DIAGNOSIS — O35.8XX0 CYSTIC HYGROMA OF FETUS IN SINGLETON PREGNANCY: Primary | ICD-10-CM

## 2018-12-21 DIAGNOSIS — Z34.00 SUPERVISION OF NORMAL FIRST PREGNANCY, ANTEPARTUM: Primary | ICD-10-CM

## 2018-12-21 DIAGNOSIS — O28.3 ABNORMAL PRENATAL ULTRASOUND: Primary | ICD-10-CM

## 2018-12-21 DIAGNOSIS — O26.90 PREGNANCY RELATED CONDITION, ANTEPARTUM: ICD-10-CM

## 2018-12-21 PROCEDURE — 76813 OB US NUCHAL MEAS 1 GEST: CPT

## 2018-12-21 PROCEDURE — 96040 ZZH GENETIC COUNSELING, EACH 30 MINUTES: CPT | Mod: ZF | Performed by: GENETIC COUNSELOR, MS

## 2018-12-21 PROCEDURE — G0463 HOSPITAL OUTPT CLINIC VISIT: HCPCS | Mod: ZF

## 2018-12-21 ASSESSMENT — MIFFLIN-ST. JEOR: SCORE: 1322.8

## 2018-12-21 NOTE — PROGRESS NOTES
"Subjective:     28 year old  at 13w1d presents for a routine prenatal appointment.  Denies vaginal bleeding or leakage of fluid.  Denies contractions or cramping.  No fetal movement yet.       The patient presents with the following concerns:     - questions re: fetal abnormality and termination/IOL and how both potentially affects pregnancy, future fertility, labor/delivery, etc.    Objective:  Vitals:    18 1302   BP: 117/78   BP Location: Left arm   Patient Position: Chair   Pulse: 80   Weight: 61.8 kg (136 lb 3.2 oz)   Height: 1.626 m (5' 4\")   See OB flowsheet    Assessment/Plan:   Encounter Diagnosis   Name Primary?     Supervision of normal first pregnancy - undecided CNM v MD Yes     - Reviewed why/how to contact provider.   - Patient education/orders or handouts today: PTL signs/symptoms   - Follow-up with MFM scheduled for today, after current appt  - Return to clinic in 4-5 weeks and prn if questions or concerns.   "

## 2018-12-21 NOTE — LETTER
"2018       RE: Hakeem Meade  [address] [place] [address]     Dear Colleague,    Thank you for referring your patient, Hakeem Meade, to the [facility] at [facility]. Please see a copy of my visit note below.    Subjective:     28 year old  at 13w1d presents for a routine prenatal appointment.  Denies vaginal bleeding or leakage of fluid.  Denies contractions or cramping.  No fetal movement yet.       The patient presents with the following concerns:     - questions re: fetal abnormality and termination/IOL and how both potentially affects pregnancy, future fertility, labor/delivery, etc.    Objective:  Vitals:    18 1302   BP: 117/78   BP Location: Left arm   Patient Position: Chair   Pulse: 80   Weight: 61.8 kg (136 lb 3.2 oz)   Height: 1.626 m (5' 4\")   See OB flowsheet    Assessment/Plan:   Encounter Diagnosis   Name Primary?     Supervision of normal first pregnancy - undecided CNM v MD Yes     - Reviewed why/how to contact provider.   - Patient education/orders or handouts today: PTL signs/symptoms   - Follow-up with MFM scheduled for today, after current appt  - Return to clinic in 4-5 weeks and prn if questions or concerns.     Again, thank you for allowing me to participate in the care of your patient.      Sincerely,    MAMADOU Beck CNM      "

## 2018-12-21 NOTE — PROGRESS NOTES
Hakeem and her partner returned to [facility] clinic today for a repeat ultrasound. Hakeem was originally seen on 11/30 when a cystic hygroma was noted on ultrasound. Non-invasive prenatal screening (NIPT) results identified an increased risk for Monosomy X (Christine syndrome) in this pregnancy, which can oftentimes present with a cystic hygroma in the first trimester.     Growth of the cystic hygroma was noted on today's ultrasound. At Hakeem's request, we reviewed Monosomy X including the frequency and likelihood of abnormality in a future pregnancy. We discussed the importance of diagnostic testing either prenatally or on fetal tissue after a termination to more definitively clarify the genetic abnormality in this pregnancy. Knowledge of the genetic diagnosis and chromosome structures will help provide recurrence risks for future pregnancies. If fetal testing identified an abnormality that can be inherited, chromosome analysis would be recommended for Hakeem and her partner.     Hakeem has made the difficult decision to proceed with pregnancy termination. The Women's Right to Know (WRTK) 24-hour consent was completed with Dr. Jesusita Rogers today. Due to the holidays, Hakeem would not proceed with termination until after 1/2/19. Given this information, Hakeem has scheduled another ultrasound for 1/2/19 to determine fetal viability. Coordination of pregnancy termination or procedure for fetal demise will take place after this scheduled ultrasound.     Hakeem is currently uncertain of her preferred route of pregnancy termination of the pregnancy proceeds. We discussed D&E and pregnancy induction. They will think about this information and inform me of their decision. Hakeem was also encouraged to contact me prior to her scheduled ultrasound if she desires coordination of pregnancy termination prior to her 1/2 appointment.     Simi Sherwood MS, [facility]  Maternal Fetal Medicine  [facility]  Ph: [phone]  [email or web address]

## 2018-12-21 NOTE — PROGRESS NOTES
"Please see \"Imaging\" tab under \"Chart Review\" for details of today's US.      Jesusita oRgers, DO  Maternal-Fetal Medicine        "

## 2018-12-21 NOTE — LETTER
Date:December 24, 2018      Patient was self referred, no letter generated. Do not send.        [facility] Information

## 2018-12-22 PROBLEM — D18.1 CYSTIC HYGROMA: Status: ACTIVE | Noted: 2018-12-22

## 2018-12-27 ENCOUNTER — TELEPHONE (OUTPATIENT)
Dept: OBGYN | Facility: CLINIC | Age: 28
End: 2018-12-27

## 2018-12-27 ENCOUNTER — TELEPHONE (OUTPATIENT)
Dept: MATERNAL FETAL MEDICINE | Facility: CLINIC | Age: 28
End: 2018-12-27

## 2018-12-27 NOTE — TELEPHONE ENCOUNTER
I received an e-mail from Hakeem on 12/24 stating that her and her partner have chosen to pursue pregnancy termination as soon as possible. They do not desire waiting until after their scheduled ultrasound on 1/2/19.     Hakeem sees [facility] for her OB care. Her request for termination has been discussed with Virgie at [facility]. [facility] will pursue financial coverage information and contact Hakeem by the end of the day today to schedule procedure. A copy of Hakeem's completed WRTK 24-hour consent was sent directly to Virgie for completion of necessary records.    Request chromosome analysis on products of conception.    Hakeem has been informed of this plan. Once the procedure has been scheduled, Hakeem's future appointments with [facility] will be cancelled.    Simi Sherwood MS, [facility]  Maternal Fetal Medicine  [facility]  Ph: [phone]  [email or web address]

## 2018-12-27 NOTE — TELEPHONE ENCOUNTER
Received referral from [facility]-- our patient who has positive NIPT for john's sybdrome. WOuld like to pursure temrination.    Called patient and advised will work on checking insurance and scheduling. Will call patient on Friday 12/28. Pt expressed understanding and agrees with plan

## 2018-12-28 ENCOUNTER — TELEPHONE (OUTPATIENT)
Dept: OBGYN | Facility: CLINIC | Age: 28
End: 2018-12-28

## 2018-12-28 NOTE — TELEPHONE ENCOUNTER
Pt called to update plan number [employer] ID # [id number] Group # [id number]    Patient's  will be out of town Jan 8th. Would like to be scheduled prior to this date

## 2018-12-31 ENCOUNTER — TELEPHONE (OUTPATIENT)
Dept: OBGYN | Facility: CLINIC | Age: 28
End: 2018-12-31

## 2018-12-31 NOTE — TELEPHONE ENCOUNTER
Confirmed Dr. Correia will be able to perform D&E for this patient, will need to schedule with OR on Wednesday morning 1/2/19.     ROuting to Dr. Farris (on-call today) for orders

## 2018-12-31 NOTE — TELEPHONE ENCOUNTER
SPoke with patient who checked her own insurance and they verified D&E for fetal anomoly would be covered.    Will kateryna to route to Marito to check on Wednesday 1/2. Need to confirm surgery time with OR on Wednesday morning.

## 2019-01-01 DIAGNOSIS — O35.8XX0 FETAL CYSTIC HYGROMA, SINGLE GESTATION: Primary | ICD-10-CM

## 2019-01-02 ENCOUNTER — TELEPHONE (OUTPATIENT)
Dept: OBGYN | Facility: CLINIC | Age: 29
End: 2019-01-02

## 2019-01-02 DIAGNOSIS — N88.2 CERVICAL STENOSIS (UTERINE CERVIX): Primary | ICD-10-CM

## 2019-01-02 RX ORDER — MISOPROSTOL 200 UG/1
TABLET ORAL
Qty: 2 TABLET | Refills: 0 | Status: ON HOLD | OUTPATIENT
Start: 2019-01-02 | End: 2019-01-04

## 2019-01-02 NOTE — TELEPHONE ENCOUNTER
Rcvd call back from [facility] that procedure is covered by pt's insurance.

## 2019-01-02 NOTE — TELEPHONE ENCOUNTER
Discussed surgery prep and misoprostol administration at length with patient.     Patient requesting genetic testing of fetal remains. Routing to Dr. Correia to place these orders.    Ordered misoprostol to pt preferred pharmacy   No

## 2019-01-02 NOTE — TELEPHONE ENCOUNTER
Rcvd call from [place] who states procedure is a policy exclusion and not covered by insurance. Nurse spoke with patient who reports she was assured by her insurance company that the procedure is covered.    [employer] securing will recheck insurance and call triage back.

## 2019-01-04 ENCOUNTER — HOSPITAL ENCOUNTER (OUTPATIENT)
Facility: CLINIC | Age: 29
Discharge: HOME | End: 2019-01-04
Attending: OBSTETRICS & GYNECOLOGY | Admitting: OBSTETRICS & GYNECOLOGY
Payer: COMMERCIAL

## 2019-01-04 ENCOUNTER — ANESTHESIA (OUTPATIENT)
Dept: SURGERY | Facility: CLINIC | Age: 29
End: 2019-01-04
Payer: COMMERCIAL

## 2019-01-04 ENCOUNTER — ANESTHESIA EVENT (OUTPATIENT)
Dept: SURGERY | Facility: CLINIC | Age: 29
End: 2019-01-04
Payer: COMMERCIAL

## 2019-01-04 VITALS
WEIGHT: 134.04 LBS | HEIGHT: 64 IN | RESPIRATION RATE: 16 BRPM | TEMPERATURE: 98.4 F | BODY MASS INDEX: 22.88 KG/M2 | DIASTOLIC BLOOD PRESSURE: 73 MMHG | SYSTOLIC BLOOD PRESSURE: 106 MMHG | OXYGEN SATURATION: 98 % | HEART RATE: 77 BPM

## 2019-01-04 DIAGNOSIS — G89.18 POST-OPERATIVE PAIN: Primary | ICD-10-CM

## 2019-01-04 LAB
ABO + RH BLD: NORMAL
ABO + RH BLD: NORMAL
BLD GP AB SCN SERPL QL: NORMAL
BLOOD BANK CMNT PATIENT-IMP: NORMAL
GLUCOSE SERPL-MCNC: 91 MG/DL (ref 70–99)
HGB BLD-MCNC: 12.8 G/DL (ref 11.7–15.7)
SPECIMEN EXP DATE BLD: NORMAL

## 2019-01-04 PROCEDURE — 37000009 ZZH ANESTHESIA TECHNICAL FEE, EACH ADDTL 15 MIN: Performed by: OBSTETRICS & GYNECOLOGY

## 2019-01-04 PROCEDURE — 00000159 ZZHCL STATISTIC H-SEND OUTS PREP: Performed by: OBSTETRICS & GYNECOLOGY

## 2019-01-04 PROCEDURE — 86900 BLOOD TYPING SEROLOGIC ABO: CPT | Performed by: OBSTETRICS & GYNECOLOGY

## 2019-01-04 PROCEDURE — 88262 CHROMOSOME ANALYSIS 15-20: CPT | Performed by: OBSTETRICS & GYNECOLOGY

## 2019-01-04 PROCEDURE — 25000128 H RX IP 250 OP 636: Performed by: NURSE ANESTHETIST, CERTIFIED REGISTERED

## 2019-01-04 PROCEDURE — 40000170 ZZH STATISTIC PRE-PROCEDURE ASSESSMENT II: Performed by: OBSTETRICS & GYNECOLOGY

## 2019-01-04 PROCEDURE — 25000128 H RX IP 250 OP 636: Performed by: OBSTETRICS & GYNECOLOGY

## 2019-01-04 PROCEDURE — 25000128 H RX IP 250 OP 636: Performed by: ANESTHESIOLOGY

## 2019-01-04 PROCEDURE — 71000027 ZZH RECOVERY PHASE 2 EACH 15 MINS: Performed by: OBSTETRICS & GYNECOLOGY

## 2019-01-04 PROCEDURE — 88233 TISSUE CULTURE SKIN/BIOPSY: CPT | Performed by: OBSTETRICS & GYNECOLOGY

## 2019-01-04 PROCEDURE — 25000125 ZZHC RX 250: Performed by: OBSTETRICS & GYNECOLOGY

## 2019-01-04 PROCEDURE — 27210794 ZZH OR GENERAL SUPPLY STERILE: Performed by: OBSTETRICS & GYNECOLOGY

## 2019-01-04 PROCEDURE — 25000125 ZZHC RX 250: Performed by: NURSE ANESTHETIST, CERTIFIED REGISTERED

## 2019-01-04 PROCEDURE — 82947 ASSAY GLUCOSE BLOOD QUANT: CPT | Performed by: OBSTETRICS & GYNECOLOGY

## 2019-01-04 PROCEDURE — 37000008 ZZH ANESTHESIA TECHNICAL FEE, 1ST 30 MIN: Performed by: OBSTETRICS & GYNECOLOGY

## 2019-01-04 PROCEDURE — 88305 TISSUE EXAM BY PATHOLOGIST: CPT | Mod: 26 | Performed by: OBSTETRICS & GYNECOLOGY

## 2019-01-04 PROCEDURE — 36000051 ZZH SURGERY LEVEL 2 1ST 30 MIN - UMMC: Performed by: OBSTETRICS & GYNECOLOGY

## 2019-01-04 PROCEDURE — 86901 BLOOD TYPING SEROLOGIC RH(D): CPT | Performed by: OBSTETRICS & GYNECOLOGY

## 2019-01-04 PROCEDURE — 86850 RBC ANTIBODY SCREEN: CPT | Performed by: OBSTETRICS & GYNECOLOGY

## 2019-01-04 PROCEDURE — 88305 TISSUE EXAM BY PATHOLOGIST: CPT | Performed by: OBSTETRICS & GYNECOLOGY

## 2019-01-04 PROCEDURE — 36000053 ZZH SURGERY LEVEL 2 EA 15 ADDTL MIN - UMMC: Performed by: OBSTETRICS & GYNECOLOGY

## 2019-01-04 PROCEDURE — 36415 COLL VENOUS BLD VENIPUNCTURE: CPT | Performed by: OBSTETRICS & GYNECOLOGY

## 2019-01-04 PROCEDURE — 85018 HEMOGLOBIN: CPT | Performed by: OBSTETRICS & GYNECOLOGY

## 2019-01-04 RX ORDER — ACETAMINOPHEN 325 MG/1
650 TABLET ORAL
Status: DISCONTINUED | OUTPATIENT
Start: 2019-01-04 | End: 2019-01-09 | Stop reason: HOSPADM

## 2019-01-04 RX ORDER — MEPERIDINE HYDROCHLORIDE 25 MG/ML
12.5 INJECTION INTRAMUSCULAR; INTRAVENOUS; SUBCUTANEOUS
Status: DISCONTINUED | OUTPATIENT
Start: 2019-01-04 | End: 2019-01-09 | Stop reason: HOSPADM

## 2019-01-04 RX ORDER — PROPOFOL 10 MG/ML
INJECTION, EMULSION INTRAVENOUS PRN
Status: DISCONTINUED | OUTPATIENT
Start: 2019-01-04 | End: 2019-01-04

## 2019-01-04 RX ORDER — ONDANSETRON 2 MG/ML
INJECTION INTRAMUSCULAR; INTRAVENOUS PRN
Status: DISCONTINUED | OUTPATIENT
Start: 2019-01-04 | End: 2019-01-04

## 2019-01-04 RX ORDER — FENTANYL CITRATE 50 UG/ML
25-50 INJECTION, SOLUTION INTRAMUSCULAR; INTRAVENOUS
Status: DISCONTINUED | OUTPATIENT
Start: 2019-01-04 | End: 2019-01-09 | Stop reason: HOSPADM

## 2019-01-04 RX ORDER — LIDOCAINE HYDROCHLORIDE 20 MG/ML
INJECTION, SOLUTION INFILTRATION; PERINEURAL PRN
Status: DISCONTINUED | OUTPATIENT
Start: 2019-01-04 | End: 2019-01-04

## 2019-01-04 RX ORDER — OXYCODONE HYDROCHLORIDE 5 MG/1
5 TABLET ORAL
Status: DISCONTINUED | OUTPATIENT
Start: 2019-01-04 | End: 2019-01-09 | Stop reason: HOSPADM

## 2019-01-04 RX ORDER — PROPOFOL 10 MG/ML
INJECTION, EMULSION INTRAVENOUS CONTINUOUS PRN
Status: DISCONTINUED | OUTPATIENT
Start: 2019-01-04 | End: 2019-01-04

## 2019-01-04 RX ORDER — SODIUM CHLORIDE, SODIUM LACTATE, POTASSIUM CHLORIDE, CALCIUM CHLORIDE 600; 310; 30; 20 MG/100ML; MG/100ML; MG/100ML; MG/100ML
INJECTION, SOLUTION INTRAVENOUS CONTINUOUS
Status: DISCONTINUED | OUTPATIENT
Start: 2019-01-04 | End: 2019-01-04 | Stop reason: HOSPADM

## 2019-01-04 RX ORDER — FENTANYL CITRATE 50 UG/ML
INJECTION, SOLUTION INTRAMUSCULAR; INTRAVENOUS PRN
Status: DISCONTINUED | OUTPATIENT
Start: 2019-01-04 | End: 2019-01-04

## 2019-01-04 RX ORDER — NALOXONE HYDROCHLORIDE 0.4 MG/ML
.1-.4 INJECTION, SOLUTION INTRAMUSCULAR; INTRAVENOUS; SUBCUTANEOUS
Status: ACTIVE | OUTPATIENT
Start: 2019-01-04 | End: 2019-01-05

## 2019-01-04 RX ORDER — ONDANSETRON 4 MG/1
4 TABLET, ORALLY DISINTEGRATING ORAL EVERY 30 MIN PRN
Status: DISCONTINUED | OUTPATIENT
Start: 2019-01-04 | End: 2019-01-09 | Stop reason: HOSPADM

## 2019-01-04 RX ORDER — SODIUM CHLORIDE, SODIUM LACTATE, POTASSIUM CHLORIDE, CALCIUM CHLORIDE 600; 310; 30; 20 MG/100ML; MG/100ML; MG/100ML; MG/100ML
INJECTION, SOLUTION INTRAVENOUS CONTINUOUS
Status: DISCONTINUED | OUTPATIENT
Start: 2019-01-04 | End: 2019-01-09 | Stop reason: HOSPADM

## 2019-01-04 RX ORDER — ACETAMINOPHEN 325 MG/1
650 TABLET ORAL EVERY 4 HOURS PRN
Qty: 50 TABLET | Refills: 0 | Status: SHIPPED | OUTPATIENT
Start: 2019-01-04 | End: 2019-02-03

## 2019-01-04 RX ORDER — KETOROLAC TROMETHAMINE 30 MG/ML
INJECTION, SOLUTION INTRAMUSCULAR; INTRAVENOUS PRN
Status: DISCONTINUED | OUTPATIENT
Start: 2019-01-04 | End: 2019-01-04

## 2019-01-04 RX ORDER — ONDANSETRON 2 MG/ML
4 INJECTION INTRAMUSCULAR; INTRAVENOUS EVERY 30 MIN PRN
Status: DISCONTINUED | OUTPATIENT
Start: 2019-01-04 | End: 2019-01-09 | Stop reason: HOSPADM

## 2019-01-04 RX ORDER — IBUPROFEN 600 MG/1
600 TABLET, FILM COATED ORAL EVERY 6 HOURS PRN
Qty: 30 TABLET | Refills: 0 | Status: SHIPPED | OUTPATIENT
Start: 2019-01-04 | End: 2019-02-03

## 2019-01-04 RX ADMIN — PROPOFOL 50 MG: 10 INJECTION, EMULSION INTRAVENOUS at 11:26

## 2019-01-04 RX ADMIN — MIDAZOLAM 0.5 MG: 1 INJECTION INTRAMUSCULAR; INTRAVENOUS at 11:32

## 2019-01-04 RX ADMIN — FENTANYL CITRATE 50 MCG: 50 INJECTION, SOLUTION INTRAMUSCULAR; INTRAVENOUS at 11:22

## 2019-01-04 RX ADMIN — PROPOFOL 100 MCG/KG/MIN: 10 INJECTION, EMULSION INTRAVENOUS at 11:24

## 2019-01-04 RX ADMIN — DOXYCYCLINE 200 MG: 100 INJECTION, POWDER, LYOPHILIZED, FOR SOLUTION INTRAVENOUS at 11:18

## 2019-01-04 RX ADMIN — LIDOCAINE HYDROCHLORIDE 40 MG: 20 INJECTION, SOLUTION INFILTRATION; PERINEURAL at 11:23

## 2019-01-04 RX ADMIN — ONDANSETRON 4 MG: 2 INJECTION INTRAMUSCULAR; INTRAVENOUS at 11:50

## 2019-01-04 RX ADMIN — PROPOFOL 50 MG: 10 INJECTION, EMULSION INTRAVENOUS at 11:35

## 2019-01-04 RX ADMIN — MIDAZOLAM 2 MG: 1 INJECTION INTRAMUSCULAR; INTRAVENOUS at 11:18

## 2019-01-04 RX ADMIN — FENTANYL CITRATE 25 MCG: 50 INJECTION, SOLUTION INTRAMUSCULAR; INTRAVENOUS at 11:40

## 2019-01-04 RX ADMIN — KETOROLAC TROMETHAMINE 30 MG: 30 INJECTION, SOLUTION INTRAMUSCULAR at 11:56

## 2019-01-04 RX ADMIN — SODIUM CHLORIDE, POTASSIUM CHLORIDE, SODIUM LACTATE AND CALCIUM CHLORIDE: 600; 310; 30; 20 INJECTION, SOLUTION INTRAVENOUS at 11:18

## 2019-01-04 RX ADMIN — MIDAZOLAM 0.5 MG: 1 INJECTION INTRAMUSCULAR; INTRAVENOUS at 11:35

## 2019-01-04 ASSESSMENT — MIFFLIN-ST. JEOR: SCORE: 1313.25

## 2019-01-04 NOTE — H&P
[facility]  History and Physical      Hakeem Meade MRN# [id number]   Age: 28 year old YOB: 1990     HPI:  Ms. Hakeem Meade is a 28 year old  at 15w1d by first trimester US who presents today for D&E. NIPT resulted with increased risk for Monosomy X (Christine's Syndrome). US showed cystic hygroma. After counseling regarding these findings, patient has decided to terminate the pregnancy. She received information in compliance with the [place] Women's Right to Know Act. Consent signed 2018. She is feeling in her normal state of good health today. No symptomatic concerns.    Prenatal Labs:   Lab Results   Component Value Date    ABO B 2018    RH Pos 2018    AS Neg 2018    HEPBANG Nonreactive 2018    CHPCRT Negative 2018    GCPCRT Negative 2018    HGB 12.8 2019       OB History  Obstetric History       T0      L0     SAB0   TAB0   Ectopic0   Multiple0   Live Births0       # Outcome Date GA Lbr Maxime/2nd Weight Sex Delivery Anes PTL Lv   1 Current                   PMHx:   History reviewed. No pertinent past medical history.     PSHx:   Past Surgical History:   Procedure Laterality Date     C EACH ADD TOOTH EXTRACTION       FOOT SURGERY       Meds:  Medications Prior to Admission   Medication Sig Dispense Refill Last Dose     cholecalciferol (VITAMIN D3) 5000 units TABS tablet Take 5,000 Units by mouth daily   2019     misoprostol (CYTOTEC) 200 MCG tablet Place 2 tabs between cheek and gum, two hours prior to procedure. Allow to dissolve for 30 min, then swallow. 2 tablet 0 2019 at 0900     Prenatal Vit-Fe Fumarate-FA (PRENATAL MULTIVITAMIN PLUS IRON) 27-0.8 MG TABS per tablet Take 1 tablet by mouth daily   Taking     Allergies:    Allergies   Allergen Reactions     Cefradine [Cephradine]      Redness at injection site      FmHx:   Family History   Problem Relation Age of Onset     Diabetes Paternal Grandmother   "    SocHx: She any tobacco, alcohol, or other drug use.    ROS:  Complete 10-point ROS negative except as noted in HPI. She denies headache, blurry vision, chest pain, shortness of breath, RUQ pain, nausea, vomiting, or extremity edema.    PE:  Vit:   Patient Vitals for the past 4 hrs:   BP Temp Temp src Pulse Resp SpO2 Height Weight   19 0913 105/76 98.2  F (36.8  C) Oral 84 20 98 % 1.626 m (5' 4.02\") 60.8 kg (134 lb 0.6 oz)      Gen: Well-appearing, NAD, comfortable in bed  CV: rrr, no mrg  Pulm: Ctab, no wheezes or crackles   Abd: Soft, non-tender, +BS   Ext: No LE edema b/l    Assessment/Plan  Hakeem Meade is a 28 year old , at 15w1d by first trimester US who presents for D&E.  - Proceed with D&E  - Type & Screen  - Hemoglobin    The patient was discussed with Dr. Correia who is in agreement with the treatment plan.    Harmony Kinsey MD  Ob/Gyn, PGY-1  2019, 10:20 AM    I personally examined and evaluated Hakeem Meade on 2019.  I discussed the patient with Dr. Kinsey and agree with the presentation, exam and plan of care documented in this note with edits by me.   Meghana Correia MD MPH    "

## 2019-01-04 NOTE — BRIEF OP NOTE
[facility]  BRIEF OPERATIVE NOTE: DILATION AND EVACUATION     DATE: 1/4/2019  PATIENT: Hakeem Meade  SURGEON: Meghana Correia MD MPH  ASSISTANT(S): Harmony Kinsey MD    PRE-OPERATIVE DIAGNOSIS:   1. Single IUP at 15w1d  2. Increased risk of Monosomy X (Christine Syndrome) based on NIPT  3. Cystic Hygroma noted on US    POST-OPERATIVE DIAGNOSIS:   Same, s/p procedure    PROCEDURE: EUA, Dilation and Evacuation under ultrasound guidance  ANESTHESIA: MAC, cervical block  EBL: 75 mL   IVF: 600 mL LR   UOP: Not measured   COMPLICATIONS: None apparent     SPECIMEN(S):   1. Uterine contents - for gross, autopsy, cytogenetics, microarray    FINDINGS:   Normal appearing external genitalia and vaginal mucosa  Uterine size consistent with gestational age  Fetal tissue inspection at end was complete for GA  Thin EMS and fundus firm at end of D&E    DISPO: Transferred to PACU in stable condition    Harmony Kinsey MD  OBGYN PGY-1  01/04/19 12:06 PM

## 2019-01-04 NOTE — ANESTHESIA POSTPROCEDURE EVALUATION
Anesthesia POST Procedure Evaluation    Patient: Hakeem Meade   MRN:     [id number] Gender:   female   Age:    28 year old :      1990        Preoperative Diagnosis: Monosomy X. Fetal Cystic Hygroma, 15 Weeks   Procedure(s):  DILATION AND EVACUATION   Postop Comments: No value filed.       Anesthesia Type:  MAC    Reportable Event: NO     PAIN: Uncomplicated   Sign Out status: Comfortable, Well controlled pain     PONV: No PONV   Sign Out status:  No Nausea or Vomiting     Neuro/Psych: Uneventful perioperative course   Sign Out Status: Preoperative baseline; Age appropriate mentation     Airway/Resp.: Uneventful perioperative course   Sign Out Status: Non labored breathing, age appropriate RR; Resp. Status within EXPECTED Parameters     CV: Uneventful perioperative course   Sign Out status: Appropriate BP and perfusion indices; Appropriate HR/Rhythm     Disposition:   Sign Out in:  PACU  Disposition:  Phase II; Home  Recovery Course: Uneventful  Follow-Up: Not required           Last Anesthesia Record Vitals:  CRNA VITALS  2019 1133 - 2019 1218      2019             NIBP:  106/60    Pulse:  75    NIBP Mean:  74    Temp:  36.8  C (98.2  F)    SpO2:  100 %    Resp Rate (observed):  18          Last PACU/Preop Vitals:  Vitals:    19 0913 19 1206   BP: 105/76 106/60   Pulse: 84    Resp: 20 14   Temp: 36.8  C (98.2  F) 36.9  C (98.4  F)   SpO2: 98%          Electronically Signed By: Chad Jay DO, 2019, 12:18 PM

## 2019-01-04 NOTE — DISCHARGE INSTRUCTIONS
[facility]  Same-Day Surgery   Adult Discharge Orders & Instructions     For 24 hours after surgery    1. Get plenty of rest.  A responsible adult must stay with you for at least 24 hours after you leave the hospital.   2. Do not drive or use heavy equipment.  If you have weakness or tingling, don't drive or use heavy equipment until this feeling goes away.  3. Do not drink alcohol.  4. Avoid strenuous or risky activities.  Ask for help when climbing stairs.   5. You may feel lightheaded.  IF so, sit for a few minutes before standing.  Have someone help you get up.   6. If you have nausea (feel sick to your stomach): Drink only clear liquids such as apple juice, ginger ale, broth or 7-Up.  Rest may also help.  Be sure to drink enough fluids.  Move to a regular diet as you feel able.  7. You may have a slight fever. Call the doctor if your fever is over 100 F (37.7 C) (taken under the tongue) or lasts longer than 24 hours.  8. You may have a dry mouth, a sore throat, muscle aches or trouble sleeping.  These should go away after 24 hours.  9. Do not make important or legal decisions.   Call your doctor for any of the followin.  Signs of infection (fever, growing tenderness at the surgery site, a large amount of drainage or bleeding, severe pain, foul-smelling drainage, redness, swelling).    2. It has been over 8 to 10 hours since surgery and you are still not able to urinate (pass water).    3.  Headache for over 24 hours.    4.  Numbness, tingling or weakness the day after surgery (if you had spinal anesthesia).  To contact a doctor, call ________________________________________ or:        [phone] and ask for the resident on call for   ______________________________________________ (answered 24 hours a day)      Emergency Department:    [facility]: [phone]       (TTY for hearing impaired: [phone])    [facility]: [phone]       (TTY for  hearing impaired: [phone])

## 2019-01-04 NOTE — OP NOTE
[facility]  OPERATIVE NOTE: DILATION AND EVACUATION   DATE: 2019  PATIENT: Hakeem Meade  SURGEON: Meghana Correia MD MPH  ASSISTANT(S): Harmony Kinsey MD  PRE-OPERATIVE DIAGNOSIS:   1. Single IUP at 15w1d  2. Increased risk of Monosomy X (Christine Syndrome) based on cell-free DNA screen  3. Large septate cystic hygroma noted on US    POST-OPERATIVE DIAGNOSIS:   Same, s/p procedure    PROCEDURE: EUA, Dilation and Evacuation under ultrasound guidance  ANESTHESIA: MAC, cervical block  EBL: 75 mL   IVF: 600 mL LR   UOP: Not assessed  COMPLICATIONS: None apparent     SPECIMEN(S):   1. Uterine contents - for complete pathology (autopsy) and cytogenetics, microarray  FINDINGS:   Normal appearing external genitalia and vaginal mucosa  Uterine size consistent with gestational age  Fetal tissue inspection at end was complete for GA  Thin EMS and fundus firm at end of D&E    INDICATIONS: Hakeem Meade is a 28 year old  at 15w1d by LMP c/w 8w1d US. Large hygroma noted on ultrasound at 10 weeks and cell-free DNA screening at that time revealed increased risk for Monosomy X (Christine Syndrome) in the pregnancy.  After counseling regarding these findings, the patient has decided to terminate the pregnancy.  The patient was counseled on risks, benefits and alternatives to the above listed procedure. She received information in compliance with the [place] Woman's Right to Know Act at least 24 hours prior to the procedure. Informed consent was signed prior to the procedure.  PROCEDURE:   The patient was taken to the operating room and general anesthesia was administered.  She was then placed in the dorsal lithotomy position. The patient was then prepped and draped in the usual sterile fashion and a safety timeout performed.    An open-sided speculum was placed into the vagina and the anterior lip of the cervix grasped with a tenaculum.  Cervical anesthesia was injected using a total of 20 cc of 1%  polocaine with 5.3 units of vasopressin.  Ultrasound guidance was used during all subsequent intrauterine instrumentation.  The cervix was dilated with Lopez dilators to 43 Danish.  A 14-curved suction cannula was inserted and suction aspiration was performed until a gritty texture noted throughout the cavity.    The tenaculum was removed from the cervix.  The cervix and vaginal vault were inspected.  Good hemostasis was noted. The instruments were removed from the vagina.  Sponge and needle counts were correct at the close of the case x 2.  After anesthesia reversal, the patient was transferred to the recovery room in stable condition.      The family was provided with a memory box, plans hospital disposition.    Dr. Correia was present for the entire Dilation and Evacuation procedure.    Harmony Kinsey MD  OBGYN PGY-1  [phone]  01/04/19 12:09 PM    I participated in all aspects of Hakeem Meade's case with Dr. Kinsey on 1/4/2019 and agree with the operative details in this note with edits by me.     Meghana Correia MD MPH

## 2019-01-04 NOTE — ANESTHESIA PREPROCEDURE EVALUATION
"Anesthesia Pre-Procedure Evaluation    Patient: Hakeem Meade   MRN:     [id number] Gender:   female   Age:    28 year old :      1990        Preoperative Diagnosis: Monosomy X. Fetal Cystic Hygroma, 15 Weeks   Procedure(s):  DILATION AND EVACUATION     History reviewed. No pertinent past medical history.   History reviewed. No pertinent surgical history.       Anesthesia Evaluation     .             ROS/MED HX    ENT/Pulmonary:  - neg pulmonary ROS     Neurologic:  - neg neurologic ROS     Cardiovascular:  - neg cardiovascular ROS       METS/Exercise Tolerance:     Hematologic:  - neg hematologic  ROS       Musculoskeletal:  - neg musculoskeletal ROS       GI/Hepatic:  - neg GI/hepatic ROS       Renal/Genitourinary:  - ROS Renal section negative       Endo:  - neg endo ROS       Psychiatric:  - neg psychiatric ROS       Infectious Disease:  - neg infectious disease ROS       Malignancy:      - no malignancy   Other:    (+) Possibly pregnant                        PHYSICAL EXAM:   Mental Status/Neuro: A/A/O   Airway: Facies: Feasible  Mallampati: I  Mouth/Opening: Full  TM distance: > 6 cm  Neck ROM: Full   Respiratory: Auscultation: CTAB     Resp. Rate: Normal     Resp. Effort: Normal      CV: Rhythm: Regular  Rate: Age appropriate  Heart: Normal Sounds   Comments:      Dental: Normal                  Lab Results   Component Value Date    WBC 10.0 2018    HGB 13.3 2018    HCT 40.8 2018     2018       Preop Vitals  BP Readings from Last 3 Encounters:   19 105/76   18 117/78   18 113/76    Pulse Readings from Last 3 Encounters:   19 84   18 80   18 74      Resp Readings from Last 3 Encounters:   19 20    SpO2 Readings from Last 3 Encounters:   19 98%      Temp Readings from Last 1 Encounters:   19 36.8  C (98.2  F) (Oral)    Ht Readings from Last 1 Encounters:   19 1.626 m (5' 4.02\")      Wt Readings from Last 1 Encounters: " "  01/04/19 60.8 kg (134 lb 0.6 oz)    Estimated body mass index is 23 kg/m  as calculated from the following:    Height as of this encounter: 1.626 m (5' 4.02\").    Weight as of this encounter: 60.8 kg (134 lb 0.6 oz).     LDA:            Assessment:   ASA SCORE: 1    NPO Status: > 6 hours since completed [employer]   Documentation: H&P complete; Preop Testing complete; Consents complete   Proceeding: Proceed without further delay  Tobacco Use:  NO Active use of Tobacco/UNKNOWN Tobacco use status     Plan:   Anes. Type:  MAC   Pre-Induction: Midazolam IV   Induction:  IV (Standard)   Airway: Native Airway   Access/Monitoring: PIV   Maintenance: Propofol; IV   Emergence: Procedure Site   Logistics: Same Day Surgery     Postop Pain/Sedation Strategy:  Standard-Options: Opioids PRN     PONV Management:  Adult Risk Factors: Female, Non-Smoker, Postop Opioids  Prevention: Propofol Infusion; Ondansetron     CONSENT: Direct conversation   Plan and risks discussed with: Patient   Blood Products: Consent Deferred (Minimal Blood Loss)       Comments for Plan/Consent:  Patient here for termination of her pregnancy due to monosomy X (Christine syndrome)                         Chad Jay, DO  "

## 2019-01-04 NOTE — ANESTHESIA CARE TRANSFER NOTE
Patient: Hakeem Meade    Procedure(s):  DILATION AND EVACUATION    Diagnosis: Monosomy X. Fetal Cystic Hygroma, 15 Weeks  Diagnosis Additional Information: No value filed.    Anesthesia Type:   No value filed.     Note:  Airway :Face Mask  Patient transferred to:Phase II  Comments: Patient is drowsy upon arrival to phase II recovery; VSS, normothermic, in no distress. IV is patent. Report to RN; patient awake by RN report.Handoff Report: Identifed the Patient, Identified the Reponsible Provider, Reviewed the pertinent medical history, Discussed the surgical course, Reviewed Intra-OP anesthesia mangement and issues during anesthesia, Set expectations for post-procedure period and Allowed opportunity for questions and acknowledgement of understanding      Vitals: (Last set prior to Anesthesia Care Transfer)    CRNA VITALS  1/4/2019 1133 - 1/4/2019 1211      1/4/2019             NIBP:  106/60    Pulse:  75    NIBP Mean:  74    Temp:  36.8  C (98.2  F)    SpO2:  100 %    Resp Rate (observed):  18                Electronically Signed By: MAMADOU Luna CRNA  January 4, 2019  12:11 PM

## 2019-01-22 LAB — COPATH REPORT: NORMAL

## 2019-01-23 ENCOUNTER — OFFICE VISIT (OUTPATIENT)
Dept: OBGYN | Facility: CLINIC | Age: 29
End: 2019-01-23
Attending: OBSTETRICS & GYNECOLOGY
Payer: COMMERCIAL

## 2019-01-23 VITALS
DIASTOLIC BLOOD PRESSURE: 67 MMHG | SYSTOLIC BLOOD PRESSURE: 100 MMHG | HEART RATE: 88 BPM | WEIGHT: 132 LBS | BODY MASS INDEX: 22.65 KG/M2

## 2019-01-23 DIAGNOSIS — Z98.890 POSTOPERATIVE STATE: Primary | ICD-10-CM

## 2019-01-23 PROCEDURE — G0463 HOSPITAL OUTPT CLINIC VISIT: HCPCS | Mod: ZF

## 2019-01-23 NOTE — LETTER
Date:January 24, 2019      Patient was self referred, no letter generated. Do not send.        [facility] Health Information

## 2019-01-23 NOTE — NURSING NOTE
Chief Complaint   Patient presents with     Post-op Visit     Patient Request     right arm/hand      Health Maintenance Due   Topic Date Due     DTAP/TDAP/TD IMMUNIZATION (1 - Tdap) 07/08/2013     INFLUENZA VACCINE (1) 09/01/2018     MATERNAL SCREENING  01/03/2019     Josefina Ribera CMA on 1/23/2019 at 12:57 PM

## 2019-01-23 NOTE — LETTER
1/23/2019       RE: Laura Perry  [address] [place] [address]     Dear Colleague,    Thank you for referring your patient, Laura Perry, to the [facility] at [facility]. Please see a copy of my visit note below.    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  She reports feeling well, had no cramping really after surgery, light bleeding x 2 weeks, wondering about results.    Past Medical History  No past medical history on file.    Medications  Current Outpatient Medications   Medication     acetaminophen (TYLENOL) 325 MG tablet     cholecalciferol (VITAMIN D3) 5000 units TABS tablet     ibuprofen (ADVIL/MOTRIN) 600 MG tablet     Prenatal Vit-Fe Fumarate-FA (PRENATAL MULTIVITAMIN PLUS IRON) 27-0.8 MG TABS per tablet     No current facility-administered medications for this visit.        Allergies  Allergies   Allergen Reactions     Cefradine [Cephradine]      Redness at injection site       Review of Systems  10 point ROS of systems including Constitutional, Eyes, Respiratory, Cardiovascular, Gastroenterology, Genitourinary, Integumentary, Muscularskeletal, Psychiatric were all negative except for pertinent positives noted in the HPI.    OBJECTIVE   LMP 09/20/2018   General:  Alert, no distress   Head:  Normocephalic, without obvious abnormality   Lungs:  Clear to auscultation bilaterally   Heart:  Regular rate and rhythm, no murmur   Extremities:  normal     Labs:   Hemoglobin   Date Value Ref Range Status   01/04/2019 12.8 11.7 - 15.7 g/dL Final   ]    Pathology:   Copath Report   Date Value Ref Range Status   01/04/2019   Final    Patient Name: LAURA PERRY  MR#: [id number]  Specimen #: [id number]  Collected: 1/4/2019  Received: 1/4/2019  Reported: 1/22/2019 17:10  Ordering Phy(s): MARGARETTE FORD    For improved result  "formatting, select 'View Enhanced Report Format' under   Linked Documents section.    SPECIMEN(S):  Products of conception    FINAL DIAGNOSIS:    Products of conception, dilation and evacuation:     - Fetal and placental parts.     - 15 weeks 1 day gestation by dates.     - 15 weeks gestation by foot length.     - No gross congenital anomalies seen.     - No histopathologic changes in the placenta or fetal tissues.     - Chorionic villi, appropriate maturation for gestational age.    I have personally reviewed all specimens and/or slides, including the   listed special stains, and used them  with my medical judgement to determine or confirm the final diagnosis.    Electronically signed out by:    Sylvester Johnston M.D., [facility]    CLINICAL HISTORY:  28-year-old  female at 15 weeks 1 day by LMP, with large hygroma by   fetal ultrasound and increased risk  for monosomy X (Christine syndrome) by cell-free DNA.    GROSS:  Received fresh and labeled \"products of conception\" are multiple fragments   of placenta and fetus. A portion of  fetal skin and Achilles tendon is submitted in RPMI for cytogenetic   studies.    The membranes are tan-pink, translucent, and incomplete, and the site of   rupture cannot be identified. The  membrane insertion is unable to be assessed. The 3-vessel umbilical cord   measures 11.5 cm in total length and  0.3 x 0.4 cm in surface area, shows left -handed spiraling with   approximately 1 coils per 10 cm, and the  insertion is unable to be assessed. The external surface and cut surface   of the cord are white with no gross  lesions. The chorionic plate vessels are unable to be assessed.   Subchorionic fibrin is unable to be assessed.  The placental disc is in multiple pieces and aggregates to 11.0 x 10.0 x   2.0 cm with a trimmed weight of 68 g.  The maternal surface is markedly disrupted, and completeness cannot be   determined. The parenchyma is tan-pink  and spongy with no grossly " identifiable lesions.    The fetal fragments aggregate to 34.5 g and include skin, skeletal   structures and organs.  Four limbs are  present with five digits each.  The right foot length is 1.6 cm and the   left foot length is 1.6 cm.  Organs  identified include liver and intestines.  No abnormalities are grossly   identified.  Rep. sections are  submitted.    Summary of Sections:    1 - membrane roll; section from cord insertion site; umbilical cord  2-3 - placenta parenchyma  4 - fetal skin and possible organs (Dictated by: Regina MARTINEZ ASC   2019 12:32 PM)    MICROSCOPIC:  A microscopic examination was done. The results of the exam are reflected   in the above diagnoses. No evidence  of a molar gestation or malignancy is seen. (Sylvester Johnston M.D.)    CPT Codes:  A: 46810-IP5, SOH, SOH    TESTING LAB LOCATION:  [address] [id number] [phone]    COLLECTION SITE:  Client: [facility]  Location: UROR (B)    Resident  [facility]     ]    ASSESSMENT   Hakeem Meade is a 28 year old , postop.    PLAN   1. Reviewed pathology, continued expectations for recovery  2. Cytogenetics still pending    Total visit time was 15 minutes with >50% time spent in counseling and coordination of care for postop care, subsequent pregnancy counseling.  Meghaan Correia MD MPH          Again, thank you for allowing me to participate in the care of your patient.      Sincerely,    Estefani Correia MD

## 2019-01-23 NOTE — PROGRESS NOTES
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  She reports feeling well, had no cramping really after surgery, light bleeding x 2 weeks, wondering about results.    Past Medical History  No past medical history on file.    Medications  Current Outpatient Medications   Medication     acetaminophen (TYLENOL) 325 MG tablet     cholecalciferol (VITAMIN D3) 5000 units TABS tablet     ibuprofen (ADVIL/MOTRIN) 600 MG tablet     Prenatal Vit-Fe Fumarate-FA (PRENATAL MULTIVITAMIN PLUS IRON) 27-0.8 MG TABS per tablet     No current facility-administered medications for this visit.        Allergies  Allergies   Allergen Reactions     Cefradine [Cephradine]      Redness at injection site       Review of Systems  10 point ROS of systems including Constitutional, Eyes, Respiratory, Cardiovascular, Gastroenterology, Genitourinary, Integumentary, Muscularskeletal, Psychiatric were all negative except for pertinent positives noted in the HPI.    OBJECTIVE   LMP 09/20/2018   General:  Alert, no distress   Head:  Normocephalic, without obvious abnormality   Lungs:  Clear to auscultation bilaterally   Heart:  Regular rate and rhythm, no murmur   Extremities:  normal     Labs:   Hemoglobin   Date Value Ref Range Status   01/04/2019 12.8 11.7 - 15.7 g/dL Final   ]    Pathology:   Copath Report   Date Value Ref Range Status   01/04/2019   Final    Patient Name: LAURA PERRY  MR#: [id number]  Specimen #: [id number]  Collected: 1/4/2019  Received: 1/4/2019  Reported: 1/22/2019 17:10  Ordering Phy(s): MARGARETTE FORD    For improved result formatting, select 'View Enhanced Report Format' under   Linked Documents section.    SPECIMEN(S):  Products of conception    FINAL DIAGNOSIS:    Products of conception, dilation and evacuation:     - Fetal and placental parts.     - 15 weeks 1 day gestation by dates.     - 15  "weeks gestation by foot length.     - No gross congenital anomalies seen.     - No histopathologic changes in the placenta or fetal tissues.     - Chorionic villi, appropriate maturation for gestational age.    I have personally reviewed all specimens and/or slides, including the   listed special stains, and used them  with my medical judgement to determine or confirm the final diagnosis.    Electronically signed out by:    Sylvester Johnston M.D., [facility]    CLINICAL HISTORY:  28-year-old  female at 15 weeks 1 day by LMP, with large hygroma by   fetal ultrasound and increased risk  for monosomy X (Christine syndrome) by cell-free DNA.    GROSS:  Received fresh and labeled \"products of conception\" are multiple fragments   of placenta and fetus. A portion of  fetal skin and Achilles tendon is submitted in [facility] for cytogenetic   studies.    The membranes are tan-pink, translucent, and incomplete, and the site of   rupture cannot be identified. The  membrane insertion is unable to be assessed. The 3-vessel umbilical cord   measures 11.5 cm in total length and  0.3 x 0.4 cm in surface area, shows left -handed spiraling with   approximately 1 coils per 10 cm, and the  insertion is unable to be assessed. The external surface and cut surface   of the cord are white with no gross  lesions. The chorionic plate vessels are unable to be assessed.   Subchorionic fibrin is unable to be assessed.  The placental disc is in multiple pieces and aggregates to 11.0 x 10.0 x   2.0 cm with a trimmed weight of 68 g.  The maternal surface is markedly disrupted, and completeness cannot be   determined. The parenchyma is tan-pink  and spongy with no grossly identifiable lesions.    The fetal fragments aggregate to 34.5 g and include skin, skeletal   structures and organs.  Four limbs are  present with five digits each.  The right foot length is 1.6 cm and the   left foot length is 1.6 cm.  Organs  identified include liver and intestines.  " No abnormalities are grossly   identified.  Rep. sections are  submitted.    Summary of Sections:    1 - membrane roll; section from cord insertion site; umbilical cord  2-3 - placenta parenchyma  4 - fetal skin and possible organs (Dictated by: Regina MARTINEZ ASC   2019 12:32 PM)    MICROSCOPIC:  A microscopic examination was done. The results of the exam are reflected   in the above diagnoses. No evidence  of a molar gestation or malignancy is seen. (Sylvester Johnston M.D.)    CPT Codes:  A: 94751-BM9, SOH, SOH    TESTING LAB LOCATION:  [address] [address] [phone]    COLLECTION SITE:  Client: [facility]  Location: UROR (B)    Resident  [place]     ]    ASSESSMENT   Hakeem Meade is a 28 year old , postop.    PLAN   1. Reviewed pathology, continued expectations for recovery  2. Cytogenetics still pending    Total visit time was 15 minutes with >50% time spent in counseling and coordination of care for postop care, subsequent pregnancy counseling.  Meghana Correia MD MPH

## 2019-02-13 LAB — COPATH REPORT: NORMAL

## 2019-02-19 ENCOUNTER — TELEPHONE (OUTPATIENT)
Dept: MATERNAL FETAL MEDICINE | Facility: CLINIC | Age: 29
End: 2019-02-19

## 2019-02-19 NOTE — TELEPHONE ENCOUNTER
I spoke to Hakeem today to review the chromosome analysis results on products of conception. Hakeem was first seen in the [facility] Clinic on 11/30 due to the identification of a large cystic hygroma in the first trimester. Non-invasive prenatal screening (NIPT) identified an increased risk for Christine syndrome in this pregnancy and repeat ultrasound on 12/21 noted growth of the cystic hygroma. Hakeem proceeded with pregnancy termination on 1/4.     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 We reviewed these results today. This confirmed the most likely condition given the ultrasound findings and NIPT result. As discussed previously, most cases of Christine syndrome are sporadic. There are rare inherited forms that typically involve a partial deletion of the X chromosome or a ring chromosome, however, these abnormalities were not seen on chromosome analysis.  All questions were answered to patient's apparent satisfaction.    Simi Sherwood MS, [facility]  Maternal Fetal Medicine  [facility]  Ph: [phone]  [email or web address]

## 2019-10-03 ENCOUNTER — ANCILLARY PROCEDURE (OUTPATIENT)
Dept: ULTRASOUND IMAGING | Facility: CLINIC | Age: 29
End: 2019-10-03
Attending: ADVANCED PRACTICE MIDWIFE
Payer: COMMERCIAL

## 2019-10-03 DIAGNOSIS — Z34.91 ENCOUNTER FOR SUPERVISION OF NORMAL PREGNANCY IN FIRST TRIMESTER, UNSPECIFIED GRAVIDITY: ICD-10-CM

## 2019-10-03 PROCEDURE — 76801 OB US < 14 WKS SINGLE FETUS: CPT

## 2019-10-09 ENCOUNTER — OFFICE VISIT (OUTPATIENT)
Dept: OBGYN | Facility: CLINIC | Age: 29
End: 2019-10-09
Attending: ADVANCED PRACTICE MIDWIFE
Payer: COMMERCIAL

## 2019-10-09 VITALS
HEIGHT: 64 IN | DIASTOLIC BLOOD PRESSURE: 71 MMHG | HEART RATE: 79 BPM | WEIGHT: 140.6 LBS | SYSTOLIC BLOOD PRESSURE: 106 MMHG | BODY MASS INDEX: 24.01 KG/M2

## 2019-10-09 DIAGNOSIS — Z13.71 SCREENING FOR GENETIC DISEASE CARRIER STATUS: ICD-10-CM

## 2019-10-09 DIAGNOSIS — Z84.89 FAMILY HISTORY OF CYSTIC HYGROMA: ICD-10-CM

## 2019-10-09 DIAGNOSIS — Z34.81 NORMAL PREGNANCY IN MULTIGRAVIDA IN FIRST TRIMESTER: Primary | ICD-10-CM

## 2019-10-09 DIAGNOSIS — Z23 NEED FOR PROPHYLACTIC VACCINATION AND INOCULATION AGAINST INFLUENZA: ICD-10-CM

## 2019-10-09 DIAGNOSIS — Z36.89 ENCOUNTER FOR FETAL ANATOMIC SURVEY: ICD-10-CM

## 2019-10-09 DIAGNOSIS — Z34.90 NORMAL INTRAUTERINE PREGNANCY, ANTEPARTUM: ICD-10-CM

## 2019-10-09 PROBLEM — Z34.00 SUPERVISION OF NORMAL FIRST PREGNANCY, ANTEPARTUM: Status: RESOLVED | Noted: 2018-11-16 | Resolved: 2019-10-09

## 2019-10-09 PROBLEM — O35.9XX0 SUSPECTED FETAL ANOMALY, ANTEPARTUM: Status: RESOLVED | Noted: 2018-12-13 | Resolved: 2019-10-09

## 2019-10-09 PROBLEM — D18.1 CYSTIC HYGROMA: Status: RESOLVED | Noted: 2018-12-22 | Resolved: 2019-10-09

## 2019-10-09 LAB
ABO + RH BLD: NORMAL
ABO + RH BLD: NORMAL
BASOPHILS # BLD AUTO: 0 10E9/L (ref 0–0.2)
BASOPHILS NFR BLD AUTO: 0.1 %
BLD GP AB SCN SERPL QL: NORMAL
BLOOD BANK CMNT PATIENT-IMP: NORMAL
DEPRECATED CALCIDIOL+CALCIFEROL SERPL-MC: 31 UG/L (ref 20–75)
DIFFERENTIAL METHOD BLD: NORMAL
EOSINOPHIL # BLD AUTO: 0 10E9/L (ref 0–0.7)
EOSINOPHIL NFR BLD AUTO: 0.3 %
ERYTHROCYTE [DISTWIDTH] IN BLOOD BY AUTOMATED COUNT: 12.4 % (ref 10–15)
HBA1C MFR BLD: 5.3 % (ref 0–5.6)
HBV SURFACE AB SERPL IA-ACNC: 22.95 M[IU]/ML
HBV SURFACE AG SERPL QL IA: NONREACTIVE
HCT VFR BLD AUTO: 40.3 % (ref 35–47)
HCV AB SERPL QL IA: NONREACTIVE
HGB BLD-MCNC: 13.4 G/DL (ref 11.7–15.7)
HIV 1+2 AB+HIV1 P24 AG SERPL QL IA: NONREACTIVE
IMM GRANULOCYTES # BLD: 0 10E9/L (ref 0–0.4)
IMM GRANULOCYTES NFR BLD: 0.3 %
LYMPHOCYTES # BLD AUTO: 1.6 10E9/L (ref 0.8–5.3)
LYMPHOCYTES NFR BLD AUTO: 22.8 %
MCH RBC QN AUTO: 30.6 PG (ref 26.5–33)
MCHC RBC AUTO-ENTMCNC: 33.3 G/DL (ref 31.5–36.5)
MCV RBC AUTO: 92 FL (ref 78–100)
MONOCYTES # BLD AUTO: 0.7 10E9/L (ref 0–1.3)
MONOCYTES NFR BLD AUTO: 10.1 %
NEUTROPHILS # BLD AUTO: 4.7 10E9/L (ref 1.6–8.3)
NEUTROPHILS NFR BLD AUTO: 66.4 %
NRBC # BLD AUTO: 0 10*3/UL
NRBC BLD AUTO-RTO: 0 /100
PLATELET # BLD AUTO: 216 10E9/L (ref 150–450)
RBC # BLD AUTO: 4.38 10E12/L (ref 3.8–5.2)
RUBV IGG SERPL IA-ACNC: 61 IU/ML
SPECIMEN EXP DATE BLD: NORMAL
T PALLIDUM AB SER QL: NONREACTIVE
WBC # BLD AUTO: 7.1 10E9/L (ref 4–11)

## 2019-10-09 PROCEDURE — G0463 HOSPITAL OUTPT CLINIC VISIT: HCPCS | Mod: ZF

## 2019-10-09 PROCEDURE — 86850 RBC ANTIBODY SCREEN: CPT | Performed by: ADVANCED PRACTICE MIDWIFE

## 2019-10-09 PROCEDURE — 86706 HEP B SURFACE ANTIBODY: CPT | Performed by: ADVANCED PRACTICE MIDWIFE

## 2019-10-09 PROCEDURE — 86900 BLOOD TYPING SEROLOGIC ABO: CPT | Performed by: ADVANCED PRACTICE MIDWIFE

## 2019-10-09 PROCEDURE — 87389 HIV-1 AG W/HIV-1&-2 AB AG IA: CPT | Performed by: ADVANCED PRACTICE MIDWIFE

## 2019-10-09 PROCEDURE — 86901 BLOOD TYPING SEROLOGIC RH(D): CPT | Performed by: ADVANCED PRACTICE MIDWIFE

## 2019-10-09 PROCEDURE — 82306 VITAMIN D 25 HYDROXY: CPT | Performed by: ADVANCED PRACTICE MIDWIFE

## 2019-10-09 PROCEDURE — 87086 URINE CULTURE/COLONY COUNT: CPT | Performed by: ADVANCED PRACTICE MIDWIFE

## 2019-10-09 PROCEDURE — 85025 COMPLETE CBC W/AUTO DIFF WBC: CPT | Performed by: ADVANCED PRACTICE MIDWIFE

## 2019-10-09 PROCEDURE — G0463 HOSPITAL OUTPT CLINIC VISIT: HCPCS | Mod: 25,ZF

## 2019-10-09 PROCEDURE — 86780 TREPONEMA PALLIDUM: CPT | Performed by: ADVANCED PRACTICE MIDWIFE

## 2019-10-09 PROCEDURE — 25000128 H RX IP 250 OP 636: Mod: ZF

## 2019-10-09 PROCEDURE — 90686 IIV4 VACC NO PRSV 0.5 ML IM: CPT | Mod: ZF

## 2019-10-09 PROCEDURE — 87340 HEPATITIS B SURFACE AG IA: CPT | Performed by: ADVANCED PRACTICE MIDWIFE

## 2019-10-09 PROCEDURE — 86762 RUBELLA ANTIBODY: CPT | Performed by: ADVANCED PRACTICE MIDWIFE

## 2019-10-09 PROCEDURE — G0008 ADMIN INFLUENZA VIRUS VAC: HCPCS | Mod: ZF

## 2019-10-09 PROCEDURE — 83036 HEMOGLOBIN GLYCOSYLATED A1C: CPT | Performed by: ADVANCED PRACTICE MIDWIFE

## 2019-10-09 PROCEDURE — 86803 HEPATITIS C AB TEST: CPT | Performed by: ADVANCED PRACTICE MIDWIFE

## 2019-10-09 PROCEDURE — 36415 COLL VENOUS BLD VENIPUNCTURE: CPT | Performed by: ADVANCED PRACTICE MIDWIFE

## 2019-10-09 PROCEDURE — 83021 HEMOGLOBIN CHROMOTOGRAPHY: CPT | Performed by: ADVANCED PRACTICE MIDWIFE

## 2019-10-09 ASSESSMENT — PATIENT HEALTH QUESTIONNAIRE - PHQ9
SUM OF ALL RESPONSES TO PHQ QUESTIONS 1-9: 3
5. POOR APPETITE OR OVEREATING: NOT AT ALL

## 2019-10-09 ASSESSMENT — ANXIETY QUESTIONNAIRES
5. BEING SO RESTLESS THAT IT IS HARD TO SIT STILL: NOT AT ALL
3. WORRYING TOO MUCH ABOUT DIFFERENT THINGS: NOT AT ALL
2. NOT BEING ABLE TO STOP OR CONTROL WORRYING: NOT AT ALL
7. FEELING AFRAID AS IF SOMETHING AWFUL MIGHT HAPPEN: SEVERAL DAYS
1. FEELING NERVOUS, ANXIOUS, OR ON EDGE: NOT AT ALL
GAD7 TOTAL SCORE: 1
6. BECOMING EASILY ANNOYED OR IRRITABLE: NOT AT ALL

## 2019-10-09 ASSESSMENT — MIFFLIN-ST. JEOR: SCORE: 1337.76

## 2019-10-09 ASSESSMENT — PAIN SCALES - GENERAL: PAINLEVEL: NO PAIN (0)

## 2019-10-09 NOTE — PROGRESS NOTES
[facility] OB Intake note  Subjective   29 year old woman presents to clinic for initiation of OB care.  Patient's last menstrual period was 2019 (exact date).    at 9w0d by Estimated Date of Delivery: May 13, 2020 based on LMP.  Reviewed dating ultrasound. Pregnancy is planned.      Symptoms since LMP include nausea.  Patient has tried these relief measures: diet modification and small frequent meals.    - Genetic/Infection questionnaire completed, risks include: Hx of cystic hygroma  Have you traveled during the pregnancy?No  Have your sexual partner(s) travelled during the pregnancy?No      - Current Medications    Current Outpatient Medications   Medication Sig Dispense Refill     Prenatal Vit-Fe Fumarate-FA (PRENATAL MULTIVITAMIN PLUS IRON) 27-0.8 MG TABS per tablet Take 1 tablet by mouth daily       cholecalciferol (VITAMIN D3) 5000 units TABS tablet Take 5,000 Units by mouth daily           - Co-morbids  History reviewed. No pertinent past medical history.  - Risk for GDM -  does not Personal history of GDM, BMI>30, h/o prediabetes/glucose intolerance, first degree relative with GDM or DM    WILL NOT have an early GCT     - The patient does not h/o Pre Eclampsia, Current multi fetal gestation, Pre Gestational Diabetes (Type 1 or Type 2), chronic hypertension, renal disease, Autoimmune disease (systematic lupus erythematosus, antiphospholipid syndrome) so WILL NOT start low dose aspirin (81mg) starting between 12 and 28 weeks to prevent preeclampsia.    - The patient  does not have a history of spontaneous  birth so  WILL NOT consider progesterone starting at 16-20 weeks and/or serial transvaginal cervical length ultrasounds from 16-24 weeks.         PERSONAL/SOCIAL HISTORY    lives with their spouse.  Additional items: None    Objective  -VS: reviewed and within normal limits   -General appearance: no acute distress, patient is comfortable   NEUROLOGICAL/PSYCHIATRIC   - Orientated x3,    -Mood and affect: : normal     Assessment/Plan  Hakeem was seen today for prenatal care.    Diagnoses and all orders for this visit:    Normal pregnancy in multigravida in first trimester  -     25- OH-Vitamin D  -     ABO/Rh Type and Screen  -     CBC with Platelets Differential  -     Hepatitis B Surface Antigen  -     HIV Antigen Antibody Combo  -     Rubella Antibody IgG Quantitative  -     Treponema Abs w Reflex to RPR and Titer  -     Urine Culture Aerobic Bacterial  -     Hepatitis C antibody  -     Hepatitis B Surface Antibody  -     Hgb with reflex to ELP or RBC Solubility (Sickle Cell Screen)  -     Hgb A1c    history of cystic hygroma  -     MAT FETAL MED CTR REFERRAL-PREGNANCY    Normal intrauterine pregnancy,     Screening for genetic disease carrier status  -     MAT FETAL MED CTR REFERRAL-PREGNANCY    Encounter for fetal anatomic survey  -     MAT FETAL MED CTR REFERRAL-PREGNANCY        29 year old  9w0d weeks of pregnancy with DACIA of May 13, 2020 by LMP of Patient's last menstrual period was 2019 (exact date).. Ultrasound confirms.   Outpatient Encounter Medications as of 10/9/2019   Medication Sig Dispense Refill     Prenatal Vit-Fe Fumarate-FA (PRENATAL MULTIVITAMIN PLUS IRON) 27-0.8 MG TABS per tablet Take 1 tablet by mouth daily       cholecalciferol (VITAMIN D3) 5000 units TABS tablet Take 5,000 Units by mouth daily       No facility-administered encounter medications on file as of 10/9/2019.       Orders Placed This Encounter   Procedures     25- OH-Vitamin D     CBC with Platelets Differential     Hepatitis B Surface Antigen     HIV Antigen Antibody Combo     Rubella Antibody IgG Quantitative     Treponema Abs w Reflex to RPR and Titer     Hepatitis C antibody     Hepatitis B Surface Antibody     Hgb with reflex to ELP or RBC Solubility (Sickle Cell Screen)     Hgb A1c     MAT FETAL MED CTR REFERRAL-PREGNANCY     ABO/Rh Type and Screen                   Orders Placed This  Encounter   Procedures     25- OH-Vitamin D     CBC with Platelets Differential     Hepatitis B Surface Antigen     HIV Antigen Antibody Combo     Rubella Antibody IgG Quantitative     Treponema Abs w Reflex to RPR and Titer     Hepatitis C antibody     Hepatitis B Surface Antibody     Hgb with reflex to ELP or RBC Solubility (Sickle Cell Screen)     Hgb A1c     MAT FETAL MED CTR REFERRAL-PREGNANCY     ABO/Rh Type and Screen           - Oriented to Practice, types of care, and how to reach a provider.  Pt undecided CNM vs MD.  - Patient received 1st trimester new OB education packet complete with aide of [employer] booklet including information on genetic screening test options.  - Patient desires 1st trimester screening and desires level II ultrasound which was ordered.  - Patient was encouraged to start prenatal vitamins as tolerated.    - Patient was sent to lab for routine OB labs including: Hgb A1c at patient request.     - Pregnancy concerns to be addressed by provider at new OB exam include: hx of Cystic Hygroma.    Pt to RTO for NOB visit in 1.5 weeks and prn if questions or concerns    Justine Darden, MAMADOU PALACIO

## 2019-10-09 NOTE — LETTER
10/9/2019       RE: Hakeem Meade  [address] [place] [address]     Dear Colleague,    Thank you for referring your patient, Hakeem Meade, to the [facility] at [facility]. Please see a copy of my visit note below.    [facility] OB Intake note  Subjective   29 year old woman presents to clinic for initiation of OB care.  Patient's last menstrual period was 2019 (exact date).    at 9w0d by Estimated Date of Delivery: May 13, 2020 based on LMP.  Reviewed dating ultrasound. Pregnancy is planned.      Symptoms since LMP include nausea.  Patient has tried these relief measures: diet modification and small frequent meals.    - Genetic/Infection questionnaire completed, risks include: Hx of cystic hygroma  Have you traveled during the pregnancy?No  Have your sexual partner(s) travelled during the pregnancy?No      - Current Medications    Current Outpatient Medications   Medication Sig Dispense Refill     Prenatal Vit-Fe Fumarate-FA (PRENATAL MULTIVITAMIN PLUS IRON) 27-0.8 MG TABS per tablet Take 1 tablet by mouth daily       cholecalciferol (VITAMIN D3) 5000 units TABS tablet Take 5,000 Units by mouth daily           - Co-morbids  History reviewed. No pertinent past medical history.  - Risk for GDM -  does not Personal history of GDM, BMI>30, h/o prediabetes/glucose intolerance, first degree relative with GDM or DM    WILL NOT have an early GCT     - The patient does not h/o Pre Eclampsia, Current multi fetal gestation, Pre Gestational Diabetes (Type 1 or Type 2), chronic hypertension, renal disease, Autoimmune disease (systematic lupus erythematosus, antiphospholipid syndrome) so WILL NOT start low dose aspirin (81mg) starting between 12 and 28 weeks to prevent preeclampsia.    - The patient  does not have a history of spontaneous  birth so  WILL NOT consider progesterone starting at 16-20 weeks and/or serial transvaginal cervical length  ultrasounds from 16-24 weeks.         PERSONAL/SOCIAL HISTORY    lives with their spouse.  Additional items: None    Objective  -VS: reviewed and within normal limits   -General appearance: no acute distress, patient is comfortable   NEUROLOGICAL/PSYCHIATRIC   - Orientated x3,   -Mood and affect: : normal     Assessment/Plan  Hakeem was seen today for prenatal care.    Diagnoses and all orders for this visit:    Normal pregnancy in multigravida in first trimester  -     25- OH-Vitamin D  -     ABO/Rh Type and Screen  -     CBC with Platelets Differential  -     Hepatitis B Surface Antigen  -     HIV Antigen Antibody Combo  -     Rubella Antibody IgG Quantitative  -     Treponema Abs w Reflex to RPR and Titer  -     Urine Culture Aerobic Bacterial  -     Hepatitis C antibody  -     Hepatitis B Surface Antibody  -     Hgb with reflex to ELP or RBC Solubility (Sickle Cell Screen)  -     Hgb A1c    history of cystic hygroma  -     MAT FETAL MED CTR REFERRAL-PREGNANCY    Normal intrauterine pregnancy,     Screening for genetic disease carrier status  -     MAT FETAL MED CTR REFERRAL-PREGNANCY    Encounter for fetal anatomic survey  -     MAT FETAL MED CTR REFERRAL-PREGNANCY    29 year old  9w0d weeks of pregnancy with DACIA of May 13, 2020 by LMP of Patient's last menstrual period was 2019 (exact date).. Ultrasound confirms.   Outpatient Encounter Medications as of 10/9/2019   Medication Sig Dispense Refill     Prenatal Vit-Fe Fumarate-FA (PRENATAL MULTIVITAMIN PLUS IRON) 27-0.8 MG TABS per tablet Take 1 tablet by mouth daily       cholecalciferol (VITAMIN D3) 5000 units TABS tablet Take 5,000 Units by mouth daily       No facility-administered encounter medications on file as of 10/9/2019.       Orders Placed This Encounter   Procedures     25- OH-Vitamin D     CBC with Platelets Differential     Hepatitis B Surface Antigen     HIV Antigen Antibody Combo     Rubella Antibody IgG Quantitative      Treponema Abs w Reflex to RPR and Titer     Hepatitis C antibody     Hepatitis B Surface Antibody     Hgb with reflex to ELP or RBC Solubility (Sickle Cell Screen)     Hgb A1c     MAT FETAL MED CTR REFERRAL-PREGNANCY     ABO/Rh Type and Screen     Orders Placed This Encounter   Procedures     25- OH-Vitamin D     CBC with Platelets Differential     Hepatitis B Surface Antigen     HIV Antigen Antibody Combo     Rubella Antibody IgG Quantitative     Treponema Abs w Reflex to RPR and Titer     Hepatitis C antibody     Hepatitis B Surface Antibody     Hgb with reflex to ELP or RBC Solubility (Sickle Cell Screen)     Hgb A1c     MAT FETAL MED CTR REFERRAL-PREGNANCY     ABO/Rh Type and Screen     - Oriented to Practice, types of care, and how to reach a provider.  Pt undecided CNM vs MD.  - Patient received 1st trimester new OB education packet complete with aide of [employer] Expect[employer] booklet including information on genetic screening test options.  - Patient desires 1st trimester screening and desires level II ultrasound which was ordered.  - Patient was encouraged to start prenatal vitamins as tolerated.    - Patient was sent to lab for routine OB labs including: Hgb A1c at patient request.     - Pregnancy concerns to be addressed by provider at new OB exam include: hx of Cystic Hygroma.    Pt to RTO for NOB visit in 1.5 weeks and prn if questions or concerns    MAMADOU Heaton CNM

## 2019-10-10 LAB
BACTERIA SPEC CULT: NORMAL
HGB A1 MFR BLD: 95.3 % (ref 95–97.9)
HGB A2 MFR BLD: 3.3 % (ref 2–3.5)
HGB C MFR BLD: 0 % (ref 0–0)
HGB E MFR BLD: 0 % (ref 0–0)
HGB F MFR BLD: 1.4 % (ref 0–2.1)
HGB FRACT BLD ELPH-IMP: NORMAL
HGB OTHER MFR BLD: 0 % (ref 0–0)
HGB S BLD QL SOLY: NORMAL
HGB S MFR BLD: 0 % (ref 0–0)
Lab: NORMAL
PATH INTERP BLD-IMP: NORMAL
SPECIMEN SOURCE: NORMAL

## 2019-10-10 ASSESSMENT — ANXIETY QUESTIONNAIRES: GAD7 TOTAL SCORE: 1

## 2019-10-25 ENCOUNTER — PRE VISIT (OUTPATIENT)
Dept: MATERNAL FETAL MEDICINE | Facility: CLINIC | Age: 29
End: 2019-10-25

## 2019-10-29 ENCOUNTER — OFFICE VISIT (OUTPATIENT)
Dept: MATERNAL FETAL MEDICINE | Facility: CLINIC | Age: 29
End: 2019-10-29
Attending: ADVANCED PRACTICE MIDWIFE
Payer: COMMERCIAL

## 2019-10-29 ENCOUNTER — OFFICE VISIT (OUTPATIENT)
Dept: OBGYN | Facility: CLINIC | Age: 29
End: 2019-10-29
Attending: ADVANCED PRACTICE MIDWIFE
Payer: COMMERCIAL

## 2019-10-29 ENCOUNTER — HOSPITAL ENCOUNTER (OUTPATIENT)
Dept: ULTRASOUND IMAGING | Facility: CLINIC | Age: 29
Discharge: HOME | End: 2019-10-29
Attending: ADVANCED PRACTICE MIDWIFE | Admitting: ADVANCED PRACTICE MIDWIFE
Payer: COMMERCIAL

## 2019-10-29 VITALS
BODY MASS INDEX: 24.12 KG/M2 | HEART RATE: 85 BPM | WEIGHT: 141.3 LBS | DIASTOLIC BLOOD PRESSURE: 79 MMHG | SYSTOLIC BLOOD PRESSURE: 120 MMHG | HEIGHT: 64 IN

## 2019-10-29 DIAGNOSIS — Z82.49 FAMILY HISTORY OF HEART BLOCK: ICD-10-CM

## 2019-10-29 DIAGNOSIS — O09.299 PREVIOUS PREGNANCY COMPLICATED BY CHROMOSOMAL ABNORMALITY, ANTEPARTUM: Primary | ICD-10-CM

## 2019-10-29 DIAGNOSIS — O09.299 PREVIOUS PREGNANCY COMPLICATED BY CHROMOSOMAL ABNORMALITY, ANTEPARTUM: ICD-10-CM

## 2019-10-29 DIAGNOSIS — Z87.42 HISTORY OF TERMINATION OF PREGNANCY: ICD-10-CM

## 2019-10-29 DIAGNOSIS — N64.89 NIPPLE CRUSTING: ICD-10-CM

## 2019-10-29 DIAGNOSIS — Z34.90 NORMAL INTRAUTERINE PREGNANCY, ANTEPARTUM: Primary | ICD-10-CM

## 2019-10-29 DIAGNOSIS — O21.9 NAUSEA AND VOMITING IN PREGNANCY: ICD-10-CM

## 2019-10-29 DIAGNOSIS — O26.90 PREGNANCY RELATED CONDITION, ANTEPARTUM: ICD-10-CM

## 2019-10-29 PROCEDURE — 36415 COLL VENOUS BLD VENIPUNCTURE: CPT | Performed by: OBSTETRICS & GYNECOLOGY

## 2019-10-29 PROCEDURE — 87491 CHLMYD TRACH DNA AMP PROBE: CPT | Performed by: ADVANCED PRACTICE MIDWIFE

## 2019-10-29 PROCEDURE — 76813 OB US NUCHAL MEAS 1 GEST: CPT

## 2019-10-29 PROCEDURE — 96040 ZZH GENETIC COUNSELING, EACH 30 MINUTES: CPT

## 2019-10-29 PROCEDURE — 40000791 ZZHCL STATISTIC VERIFI PRENATAL TRISOMY 21,18,13: Performed by: OBSTETRICS & GYNECOLOGY

## 2019-10-29 PROCEDURE — 40000072 ZZH STATISTIC GENETIC COUNSELING, < 16 MIN: Mod: ZF | Performed by: GENETIC COUNSELOR, MS

## 2019-10-29 PROCEDURE — 87591 N.GONORRHOEAE DNA AMP PROB: CPT | Performed by: ADVANCED PRACTICE MIDWIFE

## 2019-10-29 PROCEDURE — G0463 HOSPITAL OUTPT CLINIC VISIT: HCPCS

## 2019-10-29 RX ORDER — PYRIDOXINE HCL (VITAMIN B6) 25 MG
25 TABLET ORAL EVERY 8 HOURS
Qty: 120 TABLET | Refills: 6 | Status: SHIPPED | OUTPATIENT
Start: 2019-10-29 | End: 2020-03-02

## 2019-10-29 ASSESSMENT — ANXIETY QUESTIONNAIRES
2. NOT BEING ABLE TO STOP OR CONTROL WORRYING: NOT AT ALL
IF YOU CHECKED OFF ANY PROBLEMS ON THIS QUESTIONNAIRE, HOW DIFFICULT HAVE THESE PROBLEMS MADE IT FOR YOU TO DO YOUR WORK, TAKE CARE OF THINGS AT HOME, OR GET ALONG WITH OTHER PEOPLE: NOT DIFFICULT AT ALL
7. FEELING AFRAID AS IF SOMETHING AWFUL MIGHT HAPPEN: SEVERAL DAYS
6. BECOMING EASILY ANNOYED OR IRRITABLE: NOT AT ALL
GAD7 TOTAL SCORE: 2
3. WORRYING TOO MUCH ABOUT DIFFERENT THINGS: NOT AT ALL
1. FEELING NERVOUS, ANXIOUS, OR ON EDGE: NOT AT ALL
5. BEING SO RESTLESS THAT IT IS HARD TO SIT STILL: SEVERAL DAYS

## 2019-10-29 ASSESSMENT — PATIENT HEALTH QUESTIONNAIRE - PHQ9
SUM OF ALL RESPONSES TO PHQ QUESTIONS 1-9: 4
5. POOR APPETITE OR OVEREATING: NOT AT ALL

## 2019-10-29 ASSESSMENT — MIFFLIN-ST. JEOR: SCORE: 1340.93

## 2019-10-29 NOTE — PROGRESS NOTES
SUBJECTIVE:   Hakeem is a 29 year old female who presents to clinic for a new OB visit with her partner.   at 11w6d with Estimated Date of Delivery: May 13, 2020 based on US confirms. Feels well. Has started PNV.     She has not had bleeding since her LMP.   She has had mild nausea. Weight loss has not occurred.   This was a planned pregnancy.   FOB is involved,  present ahd supportive.     OTHER CONCERNS: stable overall    - having daily Nausea.  Eating candy or cookies which helps.  Hasn't tried B6 but is open to it.    - Had some moments when she felt like she had low blood sugar.  Felt very tired, at times felt very cold.  No dizziness or lightheadedness.  Will work on increasing protein.    - is being cautiously optimistic that this pregnancy will be healthier than her last earlier in the year.  Is coping well overall with the loss by taking to friends.  Has her NT after this appointment so is hoping it goes well.  - Continues to be undecided CNM vs MD.  Wants to see MD team next visit and continue to decide.  - Her mother was diagnosed with heart issue, maybe heart block while she was pregnant with pt.  Pt is interested in any follow up she would need due to family history.     ===========================================   ROS: 10 point ROS neg other than the symptoms noted above in the HPI.      PSYCHIATRIC:  Denies mood changes, difficulty concentrating, depression, anxiety, panic attacks or post partum depression.    PHQ-9 score:    PHQ-9 SCORE 10/9/2019   PHQ-9 Total Score 3     SENDY-7 SCORE 2018 10/9/2019   Total Score 0 1         Past History:  Her past medical history No past medical history on file..   She has a history of  D&E for fetal genetic condition 2019  Since her last LMP she denies use of alcohol, tobacco and street drugs.  HISTORY:  Family History   Problem Relation Age of Onset     Diabetes Paternal Grandmother      Heart block Mother      Social History     Socioeconomic History      Marital status:      Spouse name: Lainey De Souza     Number of children: Not on file     Years of education: Not on file     Highest education level: Not on file   Occupational History     Occupation: analyst   Social Needs     Financial resource strain: Not on file     Food insecurity:     Worry: Not on file     Inability: Not on file     Transportation needs:     Medical: Not on file     Non-medical: Not on file   Tobacco Use     Smoking status: Never Smoker     Smokeless tobacco: Never Used   Substance and Sexual Activity     Alcohol use: No     Drug use: No     Sexual activity: Yes     Partners: Male     Birth control/protection: None   Lifestyle     Physical activity:     Days per week: Not on file     Minutes per session: Not on file     Stress: Not on file   Relationships     Social connections:     Talks on phone: Not on file     Gets together: Not on file     Attends Denominational service: Not on file     Active member of club or organization: Not on file     Attends meetings of clubs or organizations: Not on file     Relationship status: Not on file     Intimate partner violence:     Fear of current or ex partner: Not on file     Emotionally abused: Not on file     Physically abused: Not on file     Forced sexual activity: Not on file   Other Topics Concern     Not on file   Social History Narrative    How much exercise per week? 30 mins walking daily    How much calcium per day? PNV-In some foods       How much caffeine per day? none    How much vitamin D per day? PNV    Do you/your family wear seatbelts?  Yes    Do you/your family use safety helmets? n/a    Do you/your family use sunscreen? No-hat    Do you/your family keep firearms in the home? No    Do you/your family have a smoke detector(s)? Yes        October 9, 2019 Viviana Chan LPN         Current Outpatient Medications   Medication Sig     cholecalciferol (VITAMIN D3) 5000 units TABS tablet Take 5,000 Units by mouth daily     Prenatal Vit-Fe  "Fumarate-FA (PRENATAL MULTIVITAMIN PLUS IRON) 27-0.8 MG TABS per tablet Take 1 tablet by mouth daily     No current facility-administered medications for this visit.      Allergies   Allergen Reactions     Cefradine [Cephradine]      Redness at injection site       ============================================  MEDICAL HISTORY  No past medical history on file.  Past Surgical History:   Procedure Laterality Date     C EACH ADD TOOTH EXTRACTION       DILATION AND EVACUATION N/A 2019    Procedure: DILATION AND EVACUATION;  Surgeon: Meghana Hoyos MD;  Location: [facility]     FOOT SURGERY         OB History    Para Term  AB Living   2 0 0 0 1 0   SAB TAB Ectopic Multiple Live Births   0 0 0 0 0      # Outcome Date GA Lbr Maxime/2nd Weight Sex Delivery Anes PTL Lv   2 Current            1 AB 18 15w0d             Complications: Chromosome anomaly         GYN History- no history of  Abnormal Pap Smears                        Cervical procedures: none                        History of STI: none    I personally reviewed the past social/family/medical and surgical history on the date of service.   I reviewed lab work done at Intake visit with patient.    EXAM:  Ht 1.626 m (5' 4\")   Wt 64.1 kg (141 lb 4.8 oz)   LMP 2019 (Exact Date)   BMI 24.25 kg/m     EXAM:  GENERAL:  Pleasant pregnant female, alert, cooperative  and well groomed.  SKIN:  Warm and dry, without lesions or rashes  HEAD: Symmetrical features.  MOUTH:  Buccal mucosa pink, moist without lesions.  Teeth in good repair.    NECK:  Thyroid without enlargement and nodules.  Lymph nodes not palpable.   LUNGS:  Clear to auscultation.  BREAST:    No dominant, fixed or suspicious masses are noted.  No skin changes or axillary nodes.   Nipples everted.    +Noted dry skin BL nipples/\"crusted\" appearance - pt states unsure if it's been there previously, never noticed.  Not consistent with Paget disease   HEART:  RRR without " murmur.  ABDOMEN: Soft without masses , tenderness or organomegaly.  No CVA tenderness.  Uterus palpable at size equal to dates.  No scars noted.. Fetal heart tones present.  MUSCULOSKELETAL:  Full range of motion  EXTREMITIES:  No edema. No significant varicosities.   PELVIC EXAM: Deferred. Pap up to date, no worrisome s/s.   GC/CHLAMYDIA CULTURE OBTAINED:YES    Lab Results   Component Value Date    PAP wnl   in careeverywhere  2018          ASSESSMENT:  29 year old , 11w6d weeks of pregnancy with DACIA of May 13, 2020 by US confirms  Intrauterine pregnancy 11w6d size is consistent with dates  Genetic Screening: First Trimester Screen which is scheduled today after this visit.     ICD-10-CM    1. Normal intrauterine pregnancy,  Z34.90 Neisseria gonorrhoeae PCR     Chlamydia PCR (Clinic Collect)   2. History of termination of pregnancy -  Increased risk of Monosomy X (Christine Syndrome) based on cell-free DNA screen  Z87.42        PLAN:  - Reviewed use of triage nurse line and contacting the on-call provider after hours for an urgent need such as fever, vagina bleeding, bladder or vaginal infection, rupture of membranes,  or term labor.    - Reviewed best evidence for: weight gain for her weight and height for pregnancy:  Based on pre-pregnancy weight of Body mass index is 24.25 kg/m . RECOMMENDED WEIGHT GAIN: 25-35.  - Reviewed healthy diet and foods to avoid; exercise and activity during pregnancy; avoiding exposure to toxoplasmosis; and maintenance of a generally healthy lifestyle.   - Discussed the harms, benefits, side effects and alternative therapies for current prescribed and OTC medications.  - Plan internal medicine appt to discuss possibility of cardiac concerns in her pregnancy, plan Echo prn.  Will update MFM to consider fetal echo once initial NT is reassuring.   - plan avoid soaps to nipples. Use bio oil daily. Reassess next visit and prn.   - All pt's and partner's  questions  discussed and answered.  Pt verbalized understanding of and agreement to plan of care.     - Continue scheduled prenatal care and prn if questions or concerns    Soraya Blakely, MAMADOU GIMENEZM

## 2019-10-29 NOTE — PROGRESS NOTES
[facility]  Genetic Counseling Consult    Patient: Hakeem Meade YOB: 1990   Date of Service: 10/29/19      Hakeem Meade was seen at [facility] for genetic consultation to discuss the options for routine screening for fetal chromosome abnormalities.       Impression/Plan:   1.  Hakeem had an ultrasound and blood draw for NIPT (Innatal test through [facility]).  Results are expected within 7-10 days, and will be available in EPIC.  We will contact her to discuss the results, and a copy will be forwarded to the office of the referring OB provider. Hakeem does NOT want me to leave a detailed voicemail with results if she does not answer the phone. She stated she will call back to hear results and sex of the baby.    2. Maternal serum AFP (single marker screen) is recommended after 15 weeks to screen for open neural tube defects. A quad screen should not be performed.    Pregnancy History:   /Parity:    Age at Delivery: 29 year old  DACIA: 2020, by Last Menstrual Period  Gestational Age: 11w6d    No significant complications or exposures were reported in the current pregnancy.    Hakeem noriega pregnancy history is significant for:  o 15w0d D&E due to 43,X and cystic hygroma, 2019    Medical History:   Hakeem noriega reported medical history is not expected to impact pregnancy management or risks to fetal development.       Family History:   A three-generation pedigree was obtained, and is scanned under the  Media  tab.   The reported family history is negative for multiple miscarriages, stillbirths, birth defects, cognitive impairment, known genetic conditions, and consanguinity.       Carrier Screening:   The patient reports that she and the father of the pregnancy have  ancestry:      The hemoglobinopathies are a group of genetic blood diseases that occur with increased frequency in individuals of  ancestry and carrier  screening for these conditions is available.  Carrier screening for the hemoglobinopathies includes a CBC with red blood cell indices, a ferritin level, and a quantitative hemoglobin electrophoresis or HPLC.  In addition,  screening in the [facility] includes many of the hemoglobinopathies.      Expanded carrier screening for mutations in a large panel of genes associated with autosomal recessive conditions including cystic fibrosis, spinal muscular atrophy, and others, is now available.      The patient was not certain about whether to pursue carrier screening today.  She was provided with a brochure about her testing options, and contact information if she has questions or wishes to pursue screening.       Risk Assessment for Chromosome Conditions:   We explained that the risk for fetal chromosome abnormalities increases with maternal age. We discussed specific features of common chromosome abnormalities, including Down syndrome, trisomy 13, trisomy 18, and sex chromosome trisomies.      - At age 29 at midtrimester, the risk to have a baby with Down syndrome is 1 in 597.    - At age 29 at midtrimester, the risk to have a baby with any chromosome abnormality is 1 in 299.          Testing Options:   We discussed the following options:   Non-invasive Prenatal Testing (NIPT)    Maternal plasma cell-free DNA testing; first trimester ultrasound with nuchal translucency and nasal bone assessment is recommended, when appropriate    Screens for fetal trisomy 21, trisomy 13, trisomy 18, and sex chromosome aneuploidy    Cannot screen for open neural tube defects; maternal serum AFP after 15 weeks is recommended       Genetic Amniocentesis    Invasive procedure typically performed in the second trimester by which amniotic fluid is obtained for the purpose of chromosome analysis and/or other prenatal genetic analysis    Diagnostic results; >99% sensitivity for fetal chromosome abnormalities    AFAFP measurement  tests for open neural tube defects       Comprehensive (Level II) ultrasound: Detailed ultrasound performed between 18-22 weeks gestation to screen for major birth defects and markers for aneuploidy.      We reviewed the benefits and limitations of this testing.  Screening tests provide a risk assessment specific to the pregnancy for certain fetal chromosome abnormalities, but cannot definitively diagnose or exclude a fetal chromosome abnormality.  Follow-up genetic counseling and consideration of diagnostic testing is recommended with any abnormal screening result.      It was a pleasure to be involved with Yuan s care. Face-to-face time of the meeting was 45 minutes.      Indu Moore MS, [facility]  Genetic Counseling Intern  Maternal Fetal Medicine  [facility]   Phone: [phone]  Pager: [phone]  Email: [email or web address]

## 2019-10-29 NOTE — PROGRESS NOTES
"Please see \"Imaging\" tab under \"Chart Review\" for details of today's US.    Sabine Randle, DO    "

## 2019-10-29 NOTE — LETTER
10/29/2019       RE: Hakeem Meade  [address] [place] [address]     Dear Colleague,    Thank you for referring your patient, Hakeem Meade, to the [facility] at [facility]. Please see a copy of my visit note below.    SUBJECTIVE:   Hakeem is a 29 year old female who presents to clinic for a new OB visit with her partner.   at 11w6d with Estimated Date of Delivery: May 13, 2020 based on US confirms. Feels well. Has started PNV.     She has not had bleeding since her LMP.   She has had mild nausea. Weight loss has not occurred.   This was a planned pregnancy.   FOB is involved,  present ahd supportive.     OTHER CONCERNS: stable overall    - having daily Nausea.  Eating candy or cookies which helps.  Hasn't tried B6 but is open to it.    - Had some moments when she felt like she had low blood sugar.  Felt very tired, at times felt very cold.  No dizziness or lightheadedness.  Will work on increasing protein.    - is being cautiously optimistic that this pregnancy will be healthier than her last earlier in the year.  Is coping well overall with the loss by taking to friends.  Has her NT after this appointment so is hoping it goes well.  - Continues to be undecided CNM vs MD.  Wants to see MD team next visit and continue to decide.  - Her mother was diagnosed with heart issue, maybe heart block while she was pregnant with pt.  Pt is interested in any follow up she would need due to family history.     ===========================================   ROS: 10 point ROS neg other than the symptoms noted above in the HPI.      PSYCHIATRIC:  Denies mood changes, difficulty concentrating, depression, anxiety, panic attacks or post partum depression.    PHQ-9 score:    PHQ-9 SCORE 10/9/2019   PHQ-9 Total Score 3     SENDY-7 SCORE 2018 10/9/2019   Total Score 0 1         Past History:  Her past medical history No past medical history on file..   She has a  history of  D&E for fetal genetic condition 1/2019  Since her last LMP she denies use of alcohol, tobacco and street drugs.  HISTORY:  Family History   Problem Relation Age of Onset     Diabetes Paternal Grandmother      Heart block Mother      Social History     Socioeconomic History     Marital status:      Spouse name: Lainey De Souza     Number of children: Not on file     Years of education: Not on file     Highest education level: Not on file   Occupational History     Occupation: analyst   Social Needs     Financial resource strain: Not on file     Food insecurity:     Worry: Not on file     Inability: Not on file     Transportation needs:     Medical: Not on file     Non-medical: Not on file   Tobacco Use     Smoking status: Never Smoker     Smokeless tobacco: Never Used   Substance and Sexual Activity     Alcohol use: No     Drug use: No     Sexual activity: Yes     Partners: Male     Birth control/protection: None   Lifestyle     Physical activity:     Days per week: Not on file     Minutes per session: Not on file     Stress: Not on file   Relationships     Social connections:     Talks on phone: Not on file     Gets together: Not on file     Attends [employer] service: Not on file     Active member of club or organization: Not on file     Attends meetings of clubs or organizations: Not on file     Relationship status: Not on file     Intimate partner violence:     Fear of current or ex partner: Not on file     Emotionally abused: Not on file     Physically abused: Not on file     Forced sexual activity: Not on file   Other Topics Concern     Not on file   Social History Narrative    How much exercise per week? 30 mins walking daily    How much calcium per day? PNV-In some foods       How much caffeine per day? none    How much vitamin D per day? PNV    Do you/your family wear seatbelts?  Yes    Do you/your family use safety helmets? n/a    Do you/your family use sunscreen? No-hat    Do you/your family  "keep firearms in the home? No    Do you/your family have a smoke detector(s)? Yes        2019 Viviana Chan LPN         Current Outpatient Medications   Medication Sig     cholecalciferol (VITAMIN D3) 5000 units TABS tablet Take 5,000 Units by mouth daily     Prenatal Vit-Fe Fumarate-FA (PRENATAL MULTIVITAMIN PLUS IRON) 27-0.8 MG TABS per tablet Take 1 tablet by mouth daily     No current facility-administered medications for this visit.      Allergies   Allergen Reactions     Cefradine [Cephradine]      Redness at injection site       ============================================  MEDICAL HISTORY  No past medical history on file.  Past Surgical History:   Procedure Laterality Date     C EACH ADD TOOTH EXTRACTION       DILATION AND EVACUATION N/A 2019    Procedure: DILATION AND EVACUATION;  Surgeon: Meghana Hoyos MD;  Location: [facility]     FOOT SURGERY         OB History    Para Term  AB Living   2 0 0 0 1 0   SAB TAB Ectopic Multiple Live Births   0 0 0 0 0      # Outcome Date GA Lbr Maxime/2nd Weight Sex Delivery Anes PTL Lv   2 Current            1 AB 18 15w0d             Complications: Chromosome anomaly         GYN History- no history of  Abnormal Pap Smears                        Cervical procedures: none                        History of STI: none    I personally reviewed the past social/family/medical and surgical history on the date of service.   I reviewed lab work done at Intake visit with patient.    EXAM:  Ht 1.626 m (5' 4\")   Wt 64.1 kg (141 lb 4.8 oz)   LMP 2019 (Exact Date)   BMI 24.25 kg/m      EXAM:  GENERAL:  Pleasant pregnant female, alert, cooperative  and well groomed.  SKIN:  Warm and dry, without lesions or rashes  HEAD: Symmetrical features.  MOUTH:  Buccal mucosa pink, moist without lesions.  Teeth in good repair.    NECK:  Thyroid without enlargement and nodules.  Lymph nodes not palpable.   LUNGS:  Clear to auscultation.  BREAST:    " "No dominant, fixed or suspicious masses are noted.  No skin changes or axillary nodes.   Nipples everted.    +Noted dry skin BL nipples/\"crusted\" appearance - pt states unsure if it's been there previously, never noticed.  Not consistent with Paget disease   HEART:  RRR without murmur.  ABDOMEN: Soft without masses , tenderness or organomegaly.  No CVA tenderness.  Uterus palpable at size equal to dates.  No scars noted.. Fetal heart tones present.  MUSCULOSKELETAL:  Full range of motion  EXTREMITIES:  No edema. No significant varicosities.   PELVIC EXAM: Deferred. Pap up to date, no worrisome s/s.   GC/CHLAMYDIA CULTURE OBTAINED:YES    Lab Results   Component Value Date    PAP wnl   in careeverywhere  2018          ASSESSMENT:  29 year old , 11w6d weeks of pregnancy with DACIA of May 13, 2020 by US confirms  Intrauterine pregnancy 11w6d size is consistent with dates  Genetic Screening: First Trimester Screen which is scheduled today after this visit.     ICD-10-CM    1. Normal intrauterine pregnancy,  Z34.90 Neisseria gonorrhoeae PCR     Chlamydia PCR (Clinic Collect)   2. History of termination of pregnancy -  Increased risk of Monosomy X (Christine Syndrome) based on cell-free DNA screen  Z87.42        PLAN:  - Reviewed use of triage nurse line and contacting the on-call provider after hours for an urgent need such as fever, vagina bleeding, bladder or vaginal infection, rupture of membranes,  or term labor.    - Reviewed best evidence for: weight gain for her weight and height for pregnancy:  Based on pre-pregnancy weight of Body mass index is 24.25 kg/m . RECOMMENDED WEIGHT GAIN: 25-35.  - Reviewed healthy diet and foods to avoid; exercise and activity during pregnancy; avoiding exposure to toxoplasmosis; and maintenance of a generally healthy lifestyle.   - Discussed the harms, benefits, side effects and alternative therapies for current prescribed and OTC medications.  - Plan internal " medicine appt to discuss possibility of cardiac concerns in her pregnancy, plan Echo prn.  Will update MFM to consider fetal echo once initial NT is reassuring.   - plan avoid soaps to nipples. Use bio oil daily. Reassess next visit and prn.   - All pt's and partner's  questions discussed and answered.  Pt verbalized understanding of and agreement to plan of care.     - Continue scheduled prenatal care and prn if questions or concerns    MAMADOU AldridgeM

## 2019-10-30 ASSESSMENT — ANXIETY QUESTIONNAIRES: GAD7 TOTAL SCORE: 2

## 2019-11-04 ENCOUNTER — TELEPHONE (OUTPATIENT)
Dept: MATERNAL FETAL MEDICINE | Facility: CLINIC | Age: 29
End: 2019-11-04

## 2019-11-04 LAB — LAB SCANNED RESULT: NORMAL

## 2019-11-04 NOTE — TELEPHONE ENCOUNTER
Called and discussed normal NIPT results with Hakeem. Results indicate NO ANEUPLOIDY DETECTED for chromosomes 21, 18, 13, or sex chromosomes (XX).     This puts her current pregnancy at low risk for Down syndrome, trisomy 18, trisomy 13 and sex chromosome abnormalities. This test is reported to have the following sensitivities: Down syndrome- 99%, trisomy 18- 98%, and trisomy 13- 98%. Although these results are reassuring, this does not replace a standard chromosome analysis from a chorionic villus sampling or amniocentesis.     MSAFP is the appropriate second trimester screening test for open neural tube defects; the maternal quad screen is not recommended.     Her results are available in her Epic chart for her primary OB to review.    Indu Moore MS, [facility]  Genetic Counseling Intern  Maternal Fetal Medicine  [facility]   Phone: [phone]  Pager: [phone]  Email: [email or web address]

## 2019-11-26 NOTE — PROGRESS NOTES
"Return OB Visit    Seen with partner today.     S: Patient feeling well. Having nose bleeds with dry weather, using humidifier. Nausea is mostly resolved, concerned about lack of weight gain. Has been having some dysuria and frequency, urine previously was cloudy but now is more clear. Denies contractions, bleeding, leakage of fluid.     Does endorse palpitations with any caffeine, usually avoids all caffeine. Denies CP, SOB, current palpitations.     Has questions today about stories she heard from friends about differences between CNM and OB services, about c section and indication for c section, about vacuum delivery and indications, etc.     O:  /75 (BP Location: Left arm, Patient Position: Chair)   Pulse 89   Ht 1.626 m (5' 4\")   Wt 65.1 kg (143 lb 8 oz)   LMP 2019 (Exact Date)   BMI 24.63 kg/m      Doppler 142  FHT S=D    A/P  28 yo  at 16w0d by LMP cw 8w2d US here for BROCK visit. Last pregnancy with D&E at 15 weeks for large cystic hygroma, monosomy X.      #PNC  - Rh pos, Ab neg, RI, RPR NR, Hep B NR, HIV NR, G/C Neg, Pap wnl  - NIPT no aneuploidy detected, XY  - Plan for AFP today  - Level II US scheduled   - Discussed normal weight gain in pregnancy and dietary habits   - Discussed above questions with patient about possibilities for labor and delivery  - RTC in 4 weeks or as needed    #Family history of heart block  - Patient's mother discovered heart block discovered during her delivery/labor  - Referral to internal med for prenatal cardiac testing as needed  - Appointment with Dr. 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 Salazar.     Shelly Pizano MD PGY1  Obstetrics & Gynecology  19 "     The Patient was seen in [facility]uity [facility] by PAN CHRISTOPHER.  I have seen patient with the resident. Assessment and plan were jointly made.    Tomeka Salazar MD

## 2019-11-27 ENCOUNTER — OFFICE VISIT (OUTPATIENT)
Dept: INTERNAL MEDICINE | Facility: CLINIC | Age: 29
End: 2019-11-27
Attending: INTERNAL MEDICINE
Payer: COMMERCIAL

## 2019-11-27 ENCOUNTER — OFFICE VISIT (OUTPATIENT)
Dept: OBGYN | Facility: CLINIC | Age: 29
End: 2019-11-27
Attending: OBSTETRICS & GYNECOLOGY
Payer: COMMERCIAL

## 2019-11-27 VITALS
HEART RATE: 89 BPM | WEIGHT: 143 LBS | SYSTOLIC BLOOD PRESSURE: 110 MMHG | DIASTOLIC BLOOD PRESSURE: 75 MMHG | BODY MASS INDEX: 24.55 KG/M2

## 2019-11-27 VITALS
SYSTOLIC BLOOD PRESSURE: 110 MMHG | WEIGHT: 143.5 LBS | HEART RATE: 89 BPM | DIASTOLIC BLOOD PRESSURE: 75 MMHG | HEIGHT: 64 IN | BODY MASS INDEX: 24.5 KG/M2

## 2019-11-27 DIAGNOSIS — Z34.02 ENCOUNTER FOR SUPERVISION OF NORMAL FIRST PREGNANCY IN SECOND TRIMESTER: ICD-10-CM

## 2019-11-27 DIAGNOSIS — R00.2 PALPITATIONS: Primary | ICD-10-CM

## 2019-11-27 DIAGNOSIS — Z36.1 NEED FOR MATERNAL SERUM ALPHA-PROTEIN (MSAFP) SCREENING: Primary | ICD-10-CM

## 2019-11-27 LAB
ANION GAP SERPL CALCULATED.3IONS-SCNC: 8 MMOL/L (ref 3–14)
BUN SERPL-MCNC: 15 MG/DL (ref 7–30)
CALCIUM SERPL-MCNC: 9 MG/DL (ref 8.5–10.1)
CHLORIDE SERPL-SCNC: 104 MMOL/L (ref 94–109)
CO2 SERPL-SCNC: 22 MMOL/L (ref 20–32)
CREAT SERPL-MCNC: 0.46 MG/DL (ref 0.52–1.04)
GFR SERPL CREATININE-BSD FRML MDRD: >90 ML/MIN/{1.73_M2}
GLUCOSE SERPL-MCNC: 82 MG/DL (ref 70–99)
POTASSIUM SERPL-SCNC: 3.8 MMOL/L (ref 3.4–5.3)
SODIUM SERPL-SCNC: 134 MMOL/L (ref 133–144)
TSH SERPL DL<=0.005 MIU/L-ACNC: 3.46 MU/L (ref 0.4–4)

## 2019-11-27 PROCEDURE — 93005 ELECTROCARDIOGRAM TRACING: CPT | Mod: ZF | Performed by: INTERNAL MEDICINE

## 2019-11-27 PROCEDURE — 93010 ELECTROCARDIOGRAM REPORT: CPT | Performed by: INTERNAL MEDICINE

## 2019-11-27 PROCEDURE — 80048 BASIC METABOLIC PNL TOTAL CA: CPT | Performed by: INTERNAL MEDICINE

## 2019-11-27 PROCEDURE — G0463 HOSPITAL OUTPT CLINIC VISIT: HCPCS | Mod: ZF

## 2019-11-27 PROCEDURE — 84443 ASSAY THYROID STIM HORMONE: CPT | Performed by: INTERNAL MEDICINE

## 2019-11-27 PROCEDURE — 82105 ALPHA-FETOPROTEIN SERUM: CPT | Performed by: STUDENT IN AN ORGANIZED HEALTH CARE EDUCATION/TRAINING PROGRAM

## 2019-11-27 PROCEDURE — G0463 HOSPITAL OUTPT CLINIC VISIT: HCPCS | Mod: ZF,27

## 2019-11-27 PROCEDURE — 36415 COLL VENOUS BLD VENIPUNCTURE: CPT | Performed by: STUDENT IN AN ORGANIZED HEALTH CARE EDUCATION/TRAINING PROGRAM

## 2019-11-27 ASSESSMENT — MIFFLIN-ST. JEOR: SCORE: 1350.91

## 2019-11-27 NOTE — LETTER
"11/27/2019       RE: Hakeem Meade  [address] [place] [address]     Dear Colleague,    Thank you for referring your patient, Hakeem Meade, to the [facility]  at [facility]. Please see a copy of my visit note below.    HPI  Patient is here for discussion of cardiac family history. Patient reports that her mom had a cardiac issue after delivery. Her mother had a TIA recently. Her mother has high cholesterol as well.    Patient states that her mother described a situation that occurred with her in labor as \"the doctor said that the heart was weak and gave a shot to help it\".     Review of Systems     Constitutional:  Negative for fever, chills and fatigue.   Eyes:  Negative for decreased vision, strabismus and tunnel vision.   Respiratory:   Negative for cough, shortness of breath, dyspnea on exertion and postural dyspnea.    Cardiovascular:  Negative for chest pain, dyspnea on exertion, leg swelling, chest pain on exertion, chest pain at rest, leg pain and edema.   Gastrointestinal:  Negative for nausea, vomiting, abdominal pain, diarrhea, constipation and melena.   Genitourinary:  Negative for flank pain, vaginal discharge, genital sores, dyspareunia, decreased libido, arousal difficulty, abnormal vaginal bleeding, excessive menstruation, menstrual changes, hot flashes, vaginal dryness and postmenopausal bleeding.   Musculoskeletal:  Negative for myalgias, back pain, joint swelling, arthralgias, stiffness, muscle cramps, neck pain, bone pain, muscle weakness and fracture.   Skin:  Negative for itching, acne and flaky skin.   Neurological:  Negative for dizziness and tremors.   Endo/Heme:  Negative for anemia, swollen glands and bruises/bleeds easily.   Psychiatric/Behavioral:  Negative for depression, decreased concentration, mood swings and panic attacks.    Endocrine:  Negative for altered temperature regulation, polyphagia, polydipsia, unwanted hair " growth and change in facial hair.    Current Outpatient Medications   Medication     cholecalciferol (VITAMIN D3) 5000 units TABS tablet     Prenatal Vit-Fe Fumarate-FA (PRENATAL MULTIVITAMIN PLUS IRON) 27-0.8 MG TABS per tablet     vitamin B6 (PYRIDOXINE) 25 MG tablet     No current facility-administered medications for this visit.      Past Surgical History:   Procedure Laterality Date     C EACH ADD TOOTH EXTRACTION       DILATION AND EVACUATION N/A 1/4/2019    Procedure: DILATION AND EVACUATION;  Surgeon: Meghana Hoyos MD;  Location: [facility]     FOOT SURGERY         Vitals:    11/27/19 1556   BP: 110/75   Pulse: 89   Weight: 64.9 kg (143 lb)         Physical Exam  Vitals signs and nursing note reviewed.   Constitutional:       Appearance: Normal appearance.   HENT:      Head: Normocephalic and atraumatic.   Musculoskeletal:         General: No edema.   Neurological:      Mental Status: She is alert.       Assessment and Plan:  Hakeem was seen today for establish care.    Diagnoses and all orders for this visit:    Palpitations. Patient's family history remains unclear as most of the cardiac issues in pregnancy are not likely to be resolved with a single injection. Discussed palpitations with patient. EKG was obtained, per my read: normal sinus rhythm, no preexcitation. Recommend echocardiogram for further evaluation. Patient will be advised on results of the tests accordingly.   -     EKG 12-lead complete w/read - Clinics  -     Echocardiogram Complete; Future  -     TSH with free T4 reflex  -     Basic Metabolic Panel      Total time spent 45  minutes.  More than 50% of the time spent with Ms. Meade on counseling / coordinating her care    Aye Hicks MD

## 2019-11-27 NOTE — LETTER
"2019       RE: Hakeem Meade  [address] [place] [address]     Dear Colleague,    Thank you for referring your patient, Hakeem Meade, to the [facility] at [facility]. Please see a copy of my visit note below.    Return OB Visit    Seen with partner today.     S: Patient feeling well. Having nose bleeds with dry weather, using humidifier. Nausea is mostly resolved, concerned about lack of weight gain. Has been having some dysuria and frequency, urine previously was cloudy but now is more clear. Denies contractions, bleeding, leakage of fluid.     Does endorse palpitations with any caffeine, usually avoids all caffeine. Denies CP, SOB, current palpitations.     Has questions today about stories she heard from friends about differences between CNM and OB services, about c section and indication for c section, about vacuum delivery and indications, etc.     O:  /75 (BP Location: Left arm, Patient Position: Chair)   Pulse 89   Ht 1.626 m (5' 4\")   Wt 65.1 kg (143 lb 8 oz)   LMP 2019 (Exact Date)   BMI 24.63 kg/m       Doppler 142  FHT S=D    A/P  28 yo  at 16w0d by LMP cw 8w2d US here for BROCK visit. Last pregnancy with D&E at 15 weeks for large cystic hygroma, monosomy X.      #PNC  - Rh pos, Ab neg, RI, RPR NR, Hep B NR, HIV NR, G/C Neg, Pap wnl  - NIPT no aneuploidy detected, XY  - Plan for AFP today  - Level II US scheduled   - Discussed normal weight gain in pregnancy and dietary habits   - Discussed above questions with patient about possibilities for labor and delivery  - RTC in 4 weeks or as needed    #Family history of heart block  - Patient's mother discovered heart block discovered during her delivery/labor  - Referral to internal med for prenatal cardiac testing as needed  - Appointment with Dr. 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 Salazar.     Shelly Pizano MD PGY1  Obstetrics & Gynecology  11/26/19

## 2019-11-27 NOTE — NURSING NOTE
Chief Complaint   Patient presents with     Establish Care     OB 16 weeks ,Heart issues   Sherita Parra LPN

## 2019-11-27 NOTE — PROGRESS NOTES
"HPI  Patient is here for discussion of cardiac family history. Patient reports that her mom had a cardiac issue after delivery. Her mother had a TIA recently. Her mother has high cholesterol as well.    Patient states that her mother described a situation that occurred with her in labor as \"the doctor said that the heart was weak and gave a shot to help it\".     Review of Systems     Constitutional:  Negative for fever, chills and fatigue.   Eyes:  Negative for decreased vision, strabismus and tunnel vision.   Respiratory:   Negative for cough, shortness of breath, dyspnea on exertion and postural dyspnea.    Cardiovascular:  Negative for chest pain, dyspnea on exertion, leg swelling, chest pain on exertion, chest pain at rest, leg pain and edema.   Gastrointestinal:  Negative for nausea, vomiting, abdominal pain, diarrhea, constipation and melena.   Genitourinary:  Negative for flank pain, vaginal discharge, genital sores, dyspareunia, decreased libido, arousal difficulty, abnormal vaginal bleeding, excessive menstruation, menstrual changes, hot flashes, vaginal dryness and postmenopausal bleeding.   Musculoskeletal:  Negative for myalgias, back pain, joint swelling, arthralgias, stiffness, muscle cramps, neck pain, bone pain, muscle weakness and fracture.   Skin:  Negative for itching, acne and flaky skin.   Neurological:  Negative for dizziness and tremors.   Endo/Heme:  Negative for anemia, swollen glands and bruises/bleeds easily.   Psychiatric/Behavioral:  Negative for depression, decreased concentration, mood swings and panic attacks.    Endocrine:  Negative for altered temperature regulation, polyphagia, polydipsia, unwanted hair growth and change in facial hair.    Current Outpatient Medications   Medication     cholecalciferol (VITAMIN D3) 5000 units TABS tablet     Prenatal Vit-Fe Fumarate-FA (PRENATAL MULTIVITAMIN PLUS IRON) 27-0.8 MG TABS per tablet     vitamin B6 (PYRIDOXINE) 25 MG tablet     No " current facility-administered medications for this visit.      Past Surgical History:   Procedure Laterality Date     C EACH ADD TOOTH EXTRACTION       DILATION AND EVACUATION N/A 1/4/2019    Procedure: DILATION AND EVACUATION;  Surgeon: Meghana Hoyos MD;  [facility]: [facility]     FOOT SURGERY         Vitals:    11/27/19 1556   BP: 110/75   Pulse: 89   Weight: 64.9 kg (143 lb)         Physical Exam  Vitals signs and nursing note reviewed.   Constitutional:       Appearance: Normal appearance.   HENT:      Head: Normocephalic and atraumatic.   Musculoskeletal:         General: No edema.   Neurological:      Mental Status: She is alert.       Assessment and Plan:  Hakeem was seen today for establish care.    Diagnoses and all orders for this visit:    Palpitations. Patient's family history remains unclear as most of the cardiac issues in pregnancy are not likely to be resolved with a single injection. Discussed palpitations with patient. EKG was obtained, per my read: normal sinus rhythm, no preexcitation. Recommend echocardiogram for further evaluation. Patient will be advised on results of the tests accordingly.   -     EKG 12-lead complete w/read - Clinics  -     Echocardiogram Complete; Future  -     TSH with free T4 reflex  -     Basic Metabolic Panel      Total time spent 45  minutes.  More than 50% of the time spent with Ms. Meade on counseling / coordinating her care    Aye Hicks MD

## 2019-11-30 LAB
# FETUSES US: NORMAL
# FETUSES: NORMAL
AFP ADJ MOM AMN: 0.89
AFP SERPL-MCNC: 30 NG/ML
AGE - REPORTED: 31.8 YR
CURRENT SMOKER: NO
CURRENT SMOKER: NO
DIABETES STATUS PATIENT: NO
FAMILY MEMBER DISEASES HX: NO
FAMILY MEMBER DISEASES HX: NO
GA METHOD: NORMAL
GA METHOD: NORMAL
GA: NORMAL WK
IDDM PATIENT QL: NO
INTEGRATED SCN PATIENT-IMP: NORMAL
INTERPRETATION ECG - MUSE: NORMAL
LMP START DATE: NORMAL
MONOCHORIONIC TWINS: NO
SERVICE CMNT-IMP: NO
SPECIMEN DRAWN SERPL: NORMAL
VALPROIC/CARBAMAZEPINE STATUS: NO
WEIGHT UNITS: NORMAL

## 2019-11-30 ASSESSMENT — ENCOUNTER SYMPTOMS
JOINT SWELLING: 0
NECK PAIN: 0
DIZZINESS: 0
VOMITING: 0
DIARRHEA: 0
ALTERED TEMPERATURE REGULATION: 0
CONSTIPATION: 0
PANIC: 0
POLYDIPSIA: 0
INSOMNIA: 0
FATIGUE: 0
FLANK PAIN: 0
LEG SWELLING: 0
LEG PAIN: 0
BRUISES/BLEEDS EASILY: 0
DECREASED LIBIDO: 0
COUGH: 0
MUSCLE CRAMPS: 0
BACK PAIN: 0
SHORTNESS OF BREATH: 0
NERVOUS/ANXIOUS: 0
DEPRESSION: 0
TREMORS: 0
ABDOMINAL PAIN: 0
SWOLLEN GLANDS: 0
HOT FLASHES: 0
DYSPNEA ON EXERTION: 0
STIFFNESS: 0
POSTURAL DYSPNEA: 0
POLYPHAGIA: 0
MUSCLE WEAKNESS: 0
NAUSEA: 0
CHILLS: 0
MYALGIAS: 0
DECREASED CONCENTRATION: 0
ARTHRALGIAS: 0
FEVER: 0

## 2019-12-17 ENCOUNTER — ANCILLARY PROCEDURE (OUTPATIENT)
Dept: CARDIOLOGY | Facility: CLINIC | Age: 29
End: 2019-12-17
Attending: INTERNAL MEDICINE
Payer: COMMERCIAL

## 2019-12-17 ENCOUNTER — HOSPITAL ENCOUNTER (OUTPATIENT)
Dept: ULTRASOUND IMAGING | Facility: CLINIC | Age: 29
Discharge: HOME | End: 2019-12-17
Attending: ADVANCED PRACTICE MIDWIFE | Admitting: ADVANCED PRACTICE MIDWIFE
Payer: COMMERCIAL

## 2019-12-17 ENCOUNTER — OFFICE VISIT (OUTPATIENT)
Dept: MATERNAL FETAL MEDICINE | Facility: CLINIC | Age: 29
End: 2019-12-17
Attending: ADVANCED PRACTICE MIDWIFE
Payer: COMMERCIAL

## 2019-12-17 DIAGNOSIS — O09.299 PREVIOUS PREGNANCY COMPLICATED BY CHROMOSOMAL ABNORMALITY, ANTEPARTUM: Primary | ICD-10-CM

## 2019-12-17 DIAGNOSIS — O26.90 PREGNANCY RELATED CONDITION, ANTEPARTUM: ICD-10-CM

## 2019-12-17 DIAGNOSIS — R00.2 PALPITATIONS: ICD-10-CM

## 2019-12-17 PROCEDURE — 76811 OB US DETAILED SNGL FETUS: CPT

## 2019-12-26 ENCOUNTER — OFFICE VISIT (OUTPATIENT)
Dept: OBGYN | Facility: CLINIC | Age: 29
End: 2019-12-26
Attending: OBSTETRICS & GYNECOLOGY
Payer: COMMERCIAL

## 2019-12-26 VITALS
WEIGHT: 146.8 LBS | HEIGHT: 64 IN | SYSTOLIC BLOOD PRESSURE: 109 MMHG | DIASTOLIC BLOOD PRESSURE: 69 MMHG | HEART RATE: 82 BPM | BODY MASS INDEX: 25.06 KG/M2

## 2019-12-26 DIAGNOSIS — Z34.02 ENCOUNTER FOR SUPERVISION OF NORMAL FIRST PREGNANCY IN SECOND TRIMESTER: Primary | ICD-10-CM

## 2019-12-26 ASSESSMENT — PAIN SCALES - GENERAL: PAINLEVEL: NO PAIN (0)

## 2019-12-26 ASSESSMENT — MIFFLIN-ST. JEOR: SCORE: 1365.88

## 2019-12-26 NOTE — LETTER
"2019       RE: Hakeem Meade  [address] [place] [address]     Dear Colleague,    Thank you for referring your patient, Hakeem Meade, to the [facility] at [facility]. Please see a copy of my visit note below.    Return OB Visit    Seen with partner today.     S: Patient feeling well overall. Has not had any palpitations- feels like it was caffeine related. No urinary complaints. Is concerned about kick counting and curious about risk of nuchal cords. Saw Dr. Hicks and cardiac workup normal.     Denies contractions, bleeding, leakage of fluid.     O:  /69   Pulse 82   Ht 1.626 m (5' 4\")   Wt 66.6 kg (146 lb 12.8 oz)   LMP 2019 (Exact Date)   BMI 25.20 kg/m       See OB flow sheet    A/P  30 yo  at 20w1d by LMP cw 8w2d US here for BROCK visit. Last pregnancy with D&E at 15 weeks for large cystic hygroma, monosomy X.      #PNC  - Rh pos, Ab neg, RI, RPR NR, Hep B NR, HIV NR, G/C Neg, Pap wnl  - NIPT no aneuploidy detected, XY, AFP wnl  - Level II US normal anatomy  - Discussed above questions with patient, expectations for kick counting at 28 weeks    #Family history of heart block  #Caffeine induced palpitations  - EKG wnl  - Echo wnl, EF 60-65%  - TSH 3.46, repeat with 28 week labs      RTC in 4 weeks.     Seen and discussed with Dr. Salazar.     Shelly Pizano MD PGY1  Obstetrics & Gynecology  19     Appreciate note by Dr. Pizano. Patient has been seen and examined by me with the resident, agree with above note.     Tomeka Salazar MD    "

## 2019-12-26 NOTE — PROGRESS NOTES
"Return OB Visit    Seen with partner today.     S: Patient feeling well overall. Has not had any palpitations- feels like it was caffeine related. No urinary complaints. Is concerned about kick counting and curious about risk of nuchal cords. Saw Dr. Hicks and cardiac workup normal.     Denies contractions, bleeding, leakage of fluid.     O:  /69   Pulse 82   Ht 1.626 m (5' 4\")   Wt 66.6 kg (146 lb 12.8 oz)   LMP 2019 (Exact Date)   BMI 25.20 kg/m      See OB flow sheet    A/P  28 yo  at 20w1d by LMP cw 8w2d US here for BROCK visit. Last pregnancy with D&E at 15 weeks for large cystic hygroma, monosomy X.      #PNC  - Rh pos, Ab neg, RI, RPR NR, Hep B NR, HIV NR, G/C Neg, Pap wnl  - NIPT no aneuploidy detected, XY, AFP wnl  - Level II US normal anatomy  - Discussed above questions with patient, expectations for kick counting at 28 weeks    #Family history of heart block  #Caffeine induced palpitations  - EKG wnl  - Echo wnl, EF 60-65%  - TSH 3.46, repeat with 28 week labs      RTC in 4 weeks.     Seen and discussed with Dr. Salazar.     Shelly Pizano MD PGY1  Obstetrics & Gynecology  19     Appreciate note by Dr. Pizano. Patient has been seen and examined by me with the resident, agree with above note.     Tomeka Salazar MD              "

## 2020-01-24 ENCOUNTER — OFFICE VISIT (OUTPATIENT)
Dept: OBGYN | Facility: CLINIC | Age: 30
End: 2020-01-24
Attending: OBSTETRICS & GYNECOLOGY
Payer: COMMERCIAL

## 2020-01-24 VITALS
HEART RATE: 90 BPM | BODY MASS INDEX: 25.39 KG/M2 | HEIGHT: 64 IN | WEIGHT: 148.7 LBS | SYSTOLIC BLOOD PRESSURE: 110 MMHG | DIASTOLIC BLOOD PRESSURE: 76 MMHG

## 2020-01-24 DIAGNOSIS — Z82.49 FAMILY HISTORY OF HEART BLOCK: ICD-10-CM

## 2020-01-24 DIAGNOSIS — Z34.90 NORMAL INTRAUTERINE PREGNANCY, ANTEPARTUM: ICD-10-CM

## 2020-01-24 DIAGNOSIS — Z87.42 HISTORY OF TERMINATION OF PREGNANCY: ICD-10-CM

## 2020-01-24 ASSESSMENT — MIFFLIN-ST. JEOR: SCORE: 1374.5

## 2020-01-24 NOTE — PROGRESS NOTES
"[facility]  Return OB Visit    S: Hakeem Meade is a 29 year old  at 24w2d by LMP, here for return OB visit. Doing well. Having some round ligament pain after walking. Has not noticed any heart palpitations. No contractions, nausea, vomiting, or headaches. No vaginal bleeding or discharge. Confirms fetal movement.    Her pregnancy is notable for:    O: /76   Pulse 90   Ht 1.626 m (5' 4\")   Wt 67.4 kg (148 lb 11.2 oz)   LMP 2019 (Exact Date)   BMI 25.52 kg/m    Gen: Well-appearing, NAD    Fundal Height:  24  FHR: 135    A/P:  Hakeem Meade is a 29 year old  at 24w2d by LMP, here for return OB visit. Last pregnancy with D&E at 15 weeks for large cystic hygroma, monosomy X.  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      Family History of Heart Block    10/29/2019 - Pt's mother had heart block discovered during her delivery/labor. Was seen by Dr. Hicks who ordered echo, EKG, TSH.    Maternal echo wnl, EF 60-65%; EKG wnl  TSH 3.46 on 19; repeat with 28wk labs (next visit).     Normal intrauterine pregnancy,  - Undecided [facility] CNM vs MD    Due date: 20 by LMP c/w 8wk US  ABO/Rh: B POS  Antibody screen: NEG  H/H: 13.4/40.3  Last Pap Smear: 2018 wnl; repeat 2021  Rubella: immune  HepB Surface Antigen: NR  HIV: NR  RPR: NR  GC/CT: neg/neg  Urine Culture: 10-50K mixed yinka  GCT: ____  Tdap:____  GBS: ____  Flu vaccine: 10/9/19  Aneuploidy screening: nml NIPT, XX, AFP wnl Anatomy scan: wnl  It's a: GIRL! Baby's name: ____  Pediatrician: ____  Infant feeding plan: ____  Contraceptive plan: ____  Birthplace Tour: ____  Birthplan Notes: ____       RTC in 4 weeks    Cherri Brewster MS3     OBGYN Attending Addendum    I " appreciate the note above by medical student, Cherri Brewster.  I, Tracey Mckeon, was present with the medical student, who participated in the service and in the documentation of the note. I have verified the history and personally performed the physical exam and medical decision making. I have edited accordingly and agree with the assessment and plan of care as documented in the note.    Tracey Mckeon MD, MSCI    [facility]/OB[facility]N

## 2020-01-24 NOTE — LETTER
"2020       RE: Hakeem Meade  [address] [place] [address]     Dear Colleague,    Thank you for referring your patient, Hakeem Meade, to the [facility] at [facility]. Please see a copy of my visit note below.    [facility]  Return OB Visit    S: Hakeem Meade is a 29 year old  at 24w2d by LMP, here for return OB visit. Doing well. Having some round ligament pain after walking. Has not noticed any heart palpitations. No contractions, nausea, vomiting, or headaches. No vaginal bleeding or discharge. Confirms fetal movement.    Her pregnancy is notable for:    O: /76   Pulse 90   Ht 1.626 m (5' 4\")   Wt 67.4 kg (148 lb 11.2 oz)   LMP 2019 (Exact Date)   BMI 25.52 kg/m     Gen: Well-appearing, NAD    Fundal Height:  24  FHR: 135    A/P:  Hakeem Meade is a 29 year old  at 24w2d by LMP, here for return OB visit. Last pregnancy with D&E at 15 weeks for large cystic hygroma, monosomy X.  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      Family History of Heart Block    10/29/2019 - Pt's mother had heart block discovered during her delivery/labor. Was seen by Dr. Hicks who ordered echo, EKG, TSH.    Maternal echo wnl, EF 60-65%; EKG wnl  TSH 3.46 on 19; repeat with 28wk labs (next visit).     Normal intrauterine pregnancy,  - Undecided [facility] CNM vs MD    Due date: 20 by LMP c/w 8wk US  ABO/Rh: B POS  Antibody screen: NEG  H/H: 13.4/40.3  Last Pap Smear: 2018 wnl; repeat 2021  Rubella: immune  HepB Surface Antigen: NR  HIV: NR  RPR: NR  GC/CT: neg/neg  Urine Culture: 10-50K mixed yinka  GCT: ____  Tdap:____  GBS: ____  Flu vaccine: 10/9/19  Aneuploidy " screening: nml NIPT, XX, AFP wnl Anatomy scan: wnl  It's a: GIRL! Baby's name: ____  Pediatrician: ____  Infant feeding plan: ____  Contraceptive plan: ____  Birthplace Tour: ____  Birthplan Notes: ____       RTC in 4 weeks    Cherri Brewster, MS3     OBGYN Attending Addendum    I appreciate the note above by medical student, Cherri Brewster.  I, Tracey Mckeon, was present with the medical student, who participated in the service and in the documentation of the note. I have verified the history and personally performed the physical exam and medical decision making. I have edited accordingly and agree with the assessment and plan of care as documented in the note.    Tracey Mckeon MD, MSCI    [facility]/OB[facility]N

## 2020-02-14 ENCOUNTER — OFFICE VISIT (OUTPATIENT)
Dept: OBGYN | Facility: CLINIC | Age: 30
End: 2020-02-14
Payer: COMMERCIAL

## 2020-02-14 VITALS
HEART RATE: 87 BPM | DIASTOLIC BLOOD PRESSURE: 75 MMHG | SYSTOLIC BLOOD PRESSURE: 109 MMHG | WEIGHT: 154.7 LBS | BODY MASS INDEX: 26.55 KG/M2

## 2020-02-14 DIAGNOSIS — Z34.90 NORMAL INTRAUTERINE PREGNANCY, ANTEPARTUM: Primary | ICD-10-CM

## 2020-02-14 LAB
ERYTHROCYTE [DISTWIDTH] IN BLOOD BY AUTOMATED COUNT: 12.1 % (ref 10–15)
GLUCOSE 1H P 50 G GLC PO SERPL-MCNC: 110 MG/DL (ref 60–129)
HCT VFR BLD AUTO: 37.5 % (ref 35–47)
HGB BLD-MCNC: 12.4 G/DL (ref 11.7–15.7)
MCH RBC QN AUTO: 31.5 PG (ref 26.5–33)
MCHC RBC AUTO-ENTMCNC: 33.1 G/DL (ref 31.5–36.5)
MCV RBC AUTO: 95 FL (ref 78–100)
PLATELET # BLD AUTO: 205 10E9/L (ref 150–450)
RBC # BLD AUTO: 3.94 10E12/L (ref 3.8–5.2)
TSH SERPL DL<=0.005 MIU/L-ACNC: 2.94 MU/L (ref 0.4–4)
WBC # BLD AUTO: 9.6 10E9/L (ref 4–11)

## 2020-02-14 PROCEDURE — 36415 COLL VENOUS BLD VENIPUNCTURE: CPT | Performed by: STUDENT IN AN ORGANIZED HEALTH CARE EDUCATION/TRAINING PROGRAM

## 2020-02-14 PROCEDURE — 90471 IMMUNIZATION ADMIN: CPT | Mod: ZF

## 2020-02-14 PROCEDURE — 25000128 H RX IP 250 OP 636: Mod: ZF

## 2020-02-14 PROCEDURE — 86780 TREPONEMA PALLIDUM: CPT | Performed by: STUDENT IN AN ORGANIZED HEALTH CARE EDUCATION/TRAINING PROGRAM

## 2020-02-14 PROCEDURE — 90715 TDAP VACCINE 7 YRS/> IM: CPT | Mod: ZF

## 2020-02-14 PROCEDURE — 84443 ASSAY THYROID STIM HORMONE: CPT | Performed by: STUDENT IN AN ORGANIZED HEALTH CARE EDUCATION/TRAINING PROGRAM

## 2020-02-14 PROCEDURE — 82306 VITAMIN D 25 HYDROXY: CPT | Performed by: STUDENT IN AN ORGANIZED HEALTH CARE EDUCATION/TRAINING PROGRAM

## 2020-02-14 PROCEDURE — 82950 GLUCOSE TEST: CPT | Performed by: STUDENT IN AN ORGANIZED HEALTH CARE EDUCATION/TRAINING PROGRAM

## 2020-02-14 PROCEDURE — 85027 COMPLETE CBC AUTOMATED: CPT | Performed by: STUDENT IN AN ORGANIZED HEALTH CARE EDUCATION/TRAINING PROGRAM

## 2020-02-14 PROCEDURE — G0463 HOSPITAL OUTPT CLINIC VISIT: HCPCS | Mod: ZF

## 2020-02-14 ASSESSMENT — PATIENT HEALTH QUESTIONNAIRE - PHQ9: 5. POOR APPETITE OR OVEREATING: NOT AT ALL

## 2020-02-14 ASSESSMENT — ANXIETY QUESTIONNAIRES
2. NOT BEING ABLE TO STOP OR CONTROL WORRYING: SEVERAL DAYS
1. FEELING NERVOUS, ANXIOUS, OR ON EDGE: NOT AT ALL
5. BEING SO RESTLESS THAT IT IS HARD TO SIT STILL: NOT AT ALL
GAD7 TOTAL SCORE: 2
7. FEELING AFRAID AS IF SOMETHING AWFUL MIGHT HAPPEN: NOT AT ALL
6. BECOMING EASILY ANNOYED OR IRRITABLE: NOT AT ALL
3. WORRYING TOO MUCH ABOUT DIFFERENT THINGS: SEVERAL DAYS

## 2020-02-14 NOTE — PROGRESS NOTES
[facility]  Return OB Visit    S: Hakeem Meade is a 29 year old  at 24w2d by LMP, here for EOB visit. Concerns:  - different fetal movements, some fast, some slow, some feel like belching or peeing  - itch on abdomen one time, small red spot  - cramp in leg once left lower leg a couple weeks ago, no ongoing pain or redness  - blood on tissue of nose, tired humidifier and ointment  No contractions, vaginal bleeding or leaking fluid. Confirms fetal movement.    Her pregnancy is notable for:  - family h/o heart block  - prior termination for monosomy X    O: /75 (BP Location: Left arm, Patient Position: Chair)   Pulse 87   Wt 70.2 kg (154 lb 11.2 oz)   LMP 2019 (Exact Date)   BMI 26.55 kg/m    Gen: Well-appearing, NAD  Abd: soft, gravid, nontender. No rashes  Ext: no tenderness b/l calves, no cords    Fundal Height:  26cm  FHR: 155bpm    A/P:  Hakeem Meade is a 29 year old  at 27w2d by LMP, here for return EOB visit. Last pregnancy with D&E at 15 weeks for large cystic hygroma, monosomy X.    #Round ligament pain:  - warm compresses and acetaminophen    #Left leg pain: Limited pain, no c/f DVT. Advised on symptoms of DVT    #Abd itching: no rash. Recommend to let us know if palmar itching or rash develops.    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       #Family History of Heart Block     10/29/2019 - Pt's mother had heart block discovered during her delivery/labor. Was seen by Dr. Hicks who ordered echo, EKG, TSH.  Maternal echo wnl, EF 60-65%; EKG wnl  TSH 3.46 on 19; repeat TSH today      #Normal intrauterine pregnancy     Due date: 20 by LMP c/w 8wk US  B POS, emilie neg, Hgb 13.4 (NOB), rubella immune, Hep B SAg/HIV/RPR NR, GC/CT neg, UCx 10-50K mixed  Pap Smear: 2018 wnl; repeat 2021  Flu vaccine: 10/9/19  Aneuploidy screening: nml NIPT, XX, AFP wnl Anatomy scan: wnl  GCT, CBC and RPR  ordered for today  Tdap given today  Infant feeding plan: did not discuss due to time, many questions  Contraceptive plan: did not discuss due to time, many questions  -Discussed beginning kick counting at 28 weeks          RTC in 2 weeks, will call if requires sooner f/u pending GCT results    Sameer Noriega MD     The Patient was seen in Resident Continuity Clinic by SAMEER NORIEGA.  I reviewed the history & exam. Assessment and plan were jointly made.    Mirella Holly MD

## 2020-02-14 NOTE — PATIENT INSTRUCTIONS
Make appt with OB and Dr Hicks (for nose bleeding) in 2 weeks  You will be called with lab results and instructed to follow up sooner if needed

## 2020-02-15 LAB — T PALLIDUM AB SER QL: NONREACTIVE

## 2020-02-15 ASSESSMENT — ANXIETY QUESTIONNAIRES: GAD7 TOTAL SCORE: 2

## 2020-02-16 LAB — DEPRECATED CALCIDIOL+CALCIFEROL SERPL-MC: 39 UG/L (ref 20–75)

## 2020-03-02 ENCOUNTER — OFFICE VISIT (OUTPATIENT)
Dept: OBGYN | Facility: CLINIC | Age: 30
End: 2020-03-02
Attending: OBSTETRICS & GYNECOLOGY
Payer: COMMERCIAL

## 2020-03-02 ENCOUNTER — OFFICE VISIT (OUTPATIENT)
Dept: INTERNAL MEDICINE | Facility: CLINIC | Age: 30
End: 2020-03-02
Attending: INTERNAL MEDICINE
Payer: COMMERCIAL

## 2020-03-02 VITALS
DIASTOLIC BLOOD PRESSURE: 68 MMHG | HEIGHT: 64 IN | WEIGHT: 154 LBS | SYSTOLIC BLOOD PRESSURE: 103 MMHG | BODY MASS INDEX: 26.29 KG/M2 | HEART RATE: 76 BPM

## 2020-03-02 VITALS
WEIGHT: 154 LBS | DIASTOLIC BLOOD PRESSURE: 68 MMHG | HEART RATE: 76 BPM | SYSTOLIC BLOOD PRESSURE: 103 MMHG | BODY MASS INDEX: 26.43 KG/M2

## 2020-03-02 DIAGNOSIS — R04.0 NASAL BLEEDING: Primary | ICD-10-CM

## 2020-03-02 DIAGNOSIS — Z34.03 SUPERVISION OF NORMAL FIRST PREGNANCY IN THIRD TRIMESTER: Primary | ICD-10-CM

## 2020-03-02 PROCEDURE — G0463 HOSPITAL OUTPT CLINIC VISIT: HCPCS | Mod: ZF

## 2020-03-02 RX ORDER — FLUTICASONE PROPIONATE 50 MCG
1 SPRAY, SUSPENSION (ML) NASAL DAILY
Qty: 16 G | Refills: 4 | Status: SHIPPED | OUTPATIENT
Start: 2020-03-02 | End: 2020-06-22

## 2020-03-02 RX ORDER — SODIUM CHLORIDE/ALOE VERA
GEL (GRAM) NASAL 4 TIMES DAILY PRN
Qty: 3 TUBE | Refills: 4 | Status: SHIPPED | OUTPATIENT
Start: 2020-03-02 | End: 2020-06-22

## 2020-03-02 ASSESSMENT — ENCOUNTER SYMPTOMS
CHILLS: 0
SWOLLEN GLANDS: 0
POLYPHAGIA: 0
DEPRESSION: 0
SINUS CONGESTION: 1
CONSTIPATION: 0
FEVER: 0
COUGH: 0
ABDOMINAL PAIN: 0
BACK PAIN: 0
DIARRHEA: 0
PANIC: 0
POLYDIPSIA: 0
FATIGUE: 0
LOSS OF CONSCIOUSNESS: 0
LEG SWELLING: 0
PALPITATIONS: 0
BRUISES/BLEEDS EASILY: 0
NAUSEA: 0
NERVOUS/ANXIOUS: 0
INSOMNIA: 0
ALTERED TEMPERATURE REGULATION: 0
DYSPNEA ON EXERTION: 0
DECREASED CONCENTRATION: 0

## 2020-03-02 ASSESSMENT — ANXIETY QUESTIONNAIRES
7. FEELING AFRAID AS IF SOMETHING AWFUL MIGHT HAPPEN: NOT AT ALL
GAD7 TOTAL SCORE: 1
1. FEELING NERVOUS, ANXIOUS, OR ON EDGE: SEVERAL DAYS
2. NOT BEING ABLE TO STOP OR CONTROL WORRYING: NOT AT ALL
5. BEING SO RESTLESS THAT IT IS HARD TO SIT STILL: NOT AT ALL
6. BECOMING EASILY ANNOYED OR IRRITABLE: NOT AT ALL
3. WORRYING TOO MUCH ABOUT DIFFERENT THINGS: NOT AT ALL

## 2020-03-02 ASSESSMENT — MIFFLIN-ST. JEOR: SCORE: 1398.54

## 2020-03-02 ASSESSMENT — PATIENT HEALTH QUESTIONNAIRE - PHQ9
5. POOR APPETITE OR OVEREATING: NOT AT ALL
SUM OF ALL RESPONSES TO PHQ QUESTIONS 1-9: 0

## 2020-03-02 ASSESSMENT — PAIN SCALES - GENERAL: PAINLEVEL: NO PAIN (0)

## 2020-03-02 NOTE — PROGRESS NOTES
HPI  Patient is here for evaluation of nose bleeds. She reports that she has been seeing mild nosebleeds that have been easy to control. She reports that she has been using humidifier at home, also has been using saline rinses for the nose. She denies fever, chills or night sweats. She denies headaches, pressure or pain over maxillary sinuses. Patient also denies cough.     Review of Systems     Constitutional:  Negative for fever, chills and fatigue.   HENT:  Positive for nosebleeds and sinus congestion. Negative for dry mouth.    Respiratory:   Negative for cough and dyspnea on exertion.    Cardiovascular:  Negative for chest pain, dyspnea on exertion, palpitations, leg swelling and edema.   Gastrointestinal:  Negative for nausea, abdominal pain, diarrhea, constipation and melena.   Musculoskeletal:  Negative for back pain.   Skin:  Negative for itching and rash.   Neurological:  Negative for loss of consciousness.   Endo/Heme:  Negative for anemia, swollen glands and bruises/bleeds easily.   Psychiatric/Behavioral:  Negative for depression, decreased concentration, mood swings and panic attacks.    Endocrine:  Negative for altered temperature regulation, polyphagia, polydipsia, unwanted hair growth and change in facial hair.    Current Outpatient Medications   Medication     cholecalciferol (VITAMIN D3) 5000 units TABS tablet     Prenatal Vit-Fe Fumarate-FA (PRENATAL MULTIVITAMIN PLUS IRON) 27-0.8 MG TABS per tablet     No current facility-administered medications for this visit.      Vitals:    03/02/20 1110 03/02/20 1111 03/02/20 1112 03/02/20 1113   BP: 107/70 102/67 101/68 103/68   Pulse: 76 76 76 76   Weight: 69.9 kg (154 lb)        Physical Exam  Vitals signs and nursing note reviewed.   Constitutional:       Appearance: Normal appearance.   HENT:      Head: Normocephalic and atraumatic.      Nose: Congestion present. No rhinorrhea.      Mouth/Throat:      Mouth: Mucous membranes are moist.      Pharynx:  Oropharynx is clear.   Eyes:      Pupils: Pupils are equal, round, and reactive to light.   Cardiovascular:      Rate and Rhythm: Normal rate.   Pulmonary:      Effort: Pulmonary effort is normal.   Abdominal:      General: Abdomen is flat.   Musculoskeletal:         General: No edema.   Skin:     General: Skin is warm and dry.   Neurological:      General: No focal deficit present.      Mental Status: She is alert and oriented to person, place, and time.   Psychiatric:         Mood and Affect: Mood normal.         Thought Content: Thought content normal.         Judgment: Judgment normal.       Assessment and Plan:  Hakeem was seen today for follow up.    Diagnoses and all orders for this visit:    Nasal bleeding. Discussed causes of epistaxis in pregnancy with patient. Suspect vasomotor rhinitis in addition to nasal dryness due to use of forced air heating in the house. Patient was encouraged to continue with use of humidifier at home. She was also advised on use of intranasal steroid and topical saline gel. Discussed care of epistaxis, advised patient to contact the clinic if she continues to have frequent nose bleeds.   -     saline nasal (AYR SALINE) GEL topical gel; Apply into each nare 4 times daily as needed for congestion  -     fluticasone (FLONASE) 50 MCG/ACT nasal spray; Spray 1 spray into both nostrils daily    Total time spent 25 minutes.  More than 50% of the time spent with Ms. Meade on counseling / coordinating her care    Aye Hicks MD

## 2020-03-02 NOTE — NURSING NOTE
Chief Complaint   Patient presents with     Follow Up     Follow-up nose bleeds in pregnancy  OB 29 weks and 5 days   Sherita Parra LPN

## 2020-03-02 NOTE — NURSING NOTE
Chief Complaint   Patient presents with     Prenatal Care     29w5d       See SHORTY Lemons 3/2/2020

## 2020-03-02 NOTE — LETTER
3/2/2020       RE: Hakeem Meade  [address] [place] [address]     Dear Colleague,    Thank you for referring your patient, Hakeem Meade, to the [facility]  at [facility]. Please see a copy of my visit note below.    HPI  Patient is here for evaluation of nose bleeds. She reports that she has been seeing mild nosebleeds that have been easy to control. She reports that she has been using humidifier at home, also has been using saline rinses for the nose. She denies fever, chills or night sweats. She denies headaches, pressure or pain over maxillary sinuses. Patient also denies cough.     Review of Systems     Constitutional:  Negative for fever, chills and fatigue.   HENT:  Positive for nosebleeds and sinus congestion. Negative for dry mouth.    Respiratory:   Negative for cough and dyspnea on exertion.    Cardiovascular:  Negative for chest pain, dyspnea on exertion, palpitations, leg swelling and edema.   Gastrointestinal:  Negative for nausea, abdominal pain, diarrhea, constipation and melena.   Musculoskeletal:  Negative for back pain.   Skin:  Negative for itching and rash.   Neurological:  Negative for loss of consciousness.   Endo/Heme:  Negative for anemia, swollen glands and bruises/bleeds easily.   Psychiatric/Behavioral:  Negative for depression, decreased concentration, mood swings and panic attacks.    Endocrine:  Negative for altered temperature regulation, polyphagia, polydipsia, unwanted hair growth and change in facial hair.    Current Outpatient Medications   Medication     cholecalciferol (VITAMIN D3) 5000 units TABS tablet     Prenatal Vit-Fe Fumarate-FA (PRENATAL MULTIVITAMIN PLUS IRON) 27-0.8 MG TABS per tablet     No current facility-administered medications for this visit.      Vitals:    03/02/20 1110 03/02/20 1111 03/02/20 1112 03/02/20 1113   BP: 107/70 102/67 101/68 103/68   Pulse: 76 76 76 76   Weight: 69.9 kg (154 lb)         Physical Exam  Vitals signs and nursing note reviewed.   Constitutional:       Appearance: Normal appearance.   HENT:      Head: Normocephalic and atraumatic.      Nose: Congestion present. No rhinorrhea.      Mouth/Throat:      Mouth: Mucous membranes are moist.      Pharynx: Oropharynx is clear.   Eyes:      Pupils: Pupils are equal, round, and reactive to light.   Cardiovascular:      Rate and Rhythm: Normal rate.   Pulmonary:      Effort: Pulmonary effort is normal.   Abdominal:      General: Abdomen is flat.   Musculoskeletal:         General: No edema.   Skin:     General: Skin is warm and dry.   Neurological:      General: No focal deficit present.      Mental Status: She is alert and oriented to person, place, and time.   Psychiatric:         Mood and Affect: Mood normal.         Thought Content: Thought content normal.         Judgment: Judgment normal.       Assessment and Plan:  Hakeem was seen today for follow up.    Diagnoses and all orders for this visit:    Nasal bleeding. Discussed causes of epistaxis in pregnancy with patient. Suspect vasomotor rhinitis in addition to nasal dryness due to use of forced air heating in the house. Patient was encouraged to continue with use of humidifier at home. She was also advised on use of intranasal steroid and topical saline gel. Discussed care of epistaxis, advised patient to contact the clinic if she continues to have frequent nose bleeds.   -     saline nasal (AYR SALINE) GEL topical gel; Apply into each nare 4 times daily as needed for congestion  -     fluticasone (FLONASE) 50 MCG/ACT nasal spray; Spray 1 spray into both nostrils daily    Total time spent 25 minutes.  More than 50% of the time spent with Ms. Maede on counseling / coordinating her care    Aye Hicks MD

## 2020-03-02 NOTE — PROGRESS NOTES
"[facility] Obstetrics & Gynecology    Hakeem Meade  : 1990  MRN: [id number]    CC: OB Follow-up    Subjective:  Hakeem Meade is a 29 year old  at 29w5d presenting for routine OB follow-up. Today, she is doing well but has many questions. The patient's and her 's family live in [place] and will no longer be able to come visit after the birth d/t travel restrictions at this time. The patient has questions regarding hospital policy planning if coronavirus were to become widespread in the area. She has stopped eating out and was wondering whether she should continue going to birth classes for fear of jamie coronavirus.     She also has more specific questions regarding birth planning, finding a pediatrician, and planning for breast feeding. The patient was seen by a PCP this morning for frequent nose bleeds and prescribed nasal saline gel.      She denies regular, painful contractions, denies loss of fluid or vaginal bleeding.  Endorses lots of fetal movement.          OB Hx:  OB History    Para Term  AB Living   2 0 0 0 1 0   SAB TAB Ectopic Multiple Live Births   0 0 0 0 0      # Outcome Date GA Lbr Maxime/2nd Weight Sex Delivery Anes PTL Lv   2 Current            1 AB 18 15w0d             Complications: Chromosome anomaly       Problem List:  Patient Active Problem List   Diagnosis     history of cystic hygroma     Normal intrauterine pregnancy,  - Undecided [facility] CNROBERTO vs MD     History of termination of pregnancy -  Increased risk of Monosomy X (Christine Syndrome) based on cell-free DNA screen      Family history of heart block     Nausea and vomiting in pregnancy       Objective:  /68   Pulse 76   Ht 1.626 m (5' 4\")   Wt 69.9 kg (154 lb)   LMP 2019 (Exact Date)   Breastfeeding No   BMI 26.43 kg/m      Gen: Anxious, pleasant, no acute distress.   Abdominal: Nontender, gravid, fundus measuring 28.5 cm    Doptones: 145 bpm      Prenatal Labs:   Rh positive, rubella " immune, Hep B SAg/HIV/RPR NR, GC/Chlam neg,   - Pap: NIL (2018)      Assessment/Plan:  29 year old  at 29w5d by LMP, here for BROCK. Last pregnancy with D&E at 15 weeks for large cystic hygroma, monosomy X.    Family History of Heart Block:   - Pt's mother's heart block discovered during labor/delivery  - Maternal ECHO WNL w/EF 60-65%, EKG WNL  - TSH WNL 20    Routine PNC:  - NOB Labs reviewed, up-to-date   - s/p TDap and flu vaccine   - Anatomy scan: Normal   - Genetic screening: Normal NIPT, XX, AFP WNL  - GBS at 36w    Delivery planning:  - Feeding: planning breast  - Contraception plans: needs to be discussed  - Labor precautions given    Patient seen and discussed with Dr. Adam.     RTC 2 week    Keturah Barrera MD  Obstetrics & Gynecology, PGY-1  20 2:40 PM     The patient was seen and examined with Dr. Barrera.  I have reviewed and agree with the above note.  At this time, would recommend that she go to her birth classes as planned, it is ok to be out in the community, just follow the current guidelines-frequent handwashing, stay home if feeling ill.  Recommended she look into birth to 6 months ECFE classes-she is in [facility]-to help build a support network postpartum if her parents are not able to travel here after the baby is born.      Raisa Adam MD, FACOG

## 2020-03-02 NOTE — LETTER
3/2/2020       RE: Hakeem Meade  [address] [place] [address]     Dear Colleague,    Thank you for referring your patient, Hakeem Meade, to the [facility] at [facility]. Please see a copy of my visit note below.    S Obstetrics & Gynecology    Hakeem Meade  : 1990  MRN: [id number]    CC: OB Follow-up    Subjective:  Hakeem Meade is a 29 year old  at 29w5d presenting for routine OB follow-up. Today, she is doing well but has many questions. The patient's and her 's family live in [place] and will no longer be able to come visit after the birth d/t travel restrictions at this time. The patient has questions regarding hospital policy planning if coronavirus were to become widespread in the area. She has stopped eating out and was wondering whether she should continue going to birth classes for fear of jamie coronavirus.     She also has more specific questions regarding birth planning, finding a pediatrician, and planning for breast feeding. The patient was seen by a PCP this morning for frequent nose bleeds and prescribed nasal saline gel.      She denies regular, painful contractions, denies loss of fluid or vaginal bleeding.  Endorses lots of fetal movement.      OB Hx:  OB History    Para Term  AB Living   2 0 0 0 1 0   SAB TAB Ectopic Multiple Live Births   0 0 0 0 0      # Outcome Date GA Lbr Maxime/2nd Weight Sex Delivery Anes PTL Lv   2 Current            1 AB 18 15w0d             Complications: Chromosome anomaly       Problem List:  Patient Active Problem List   Diagnosis     history of cystic hygroma     Normal intrauterine pregnancy,  - Undecided [facility] CNROBERTO vs MD     History of termination of pregnancy -  Increased risk of Monosomy X (Christine Syndrome) based on cell-free DNA screen      Family history of heart block     Nausea and vomiting in pregnancy       Objective:  /68   Pulse 76   Ht 1.626  "m (5' 4\")   Wt 69.9 kg (154 lb)   LMP 2019 (Exact Date)   Breastfeeding No   BMI 26.43 kg/m       Gen: Anxious, pleasant, no acute distress.   Abdominal: Nontender, gravid, fundus measuring 28.5 cm    Doptones: 145 bpm      Prenatal Labs:   Rh positive, rubella immune, Hep B SAg/HIV/RPR NR, GC/Chlam neg,   - Pap: NIL (2018)      Assessment/Plan:  29 year old  at 29w5d by LMP, here for BROCK. Last pregnancy with D&E at 15 weeks for large cystic hygroma, monosomy X.    Family History of Heart Block:   - Pt's mother's heart block discovered during labor/delivery  - Maternal ECHO WNL w/EF 60-65%, EKG WNL  - TSH WNL 20    Routine PNC:  - NOB Labs reviewed, up-to-date   - s/p TDap and flu vaccine   - Anatomy scan: Normal   - Genetic screening: Normal NIPT, XX, AFP WNL  - GBS at 36w    Delivery planning:  - Feeding: planning breast  - Contraception plans: needs to be discussed  - Labor precautions given    Patient seen and discussed with Dr. Adam.     RTC 2 week    Keturah Brarera MD  Obstetrics & Gynecology, PGY-1  20 2:40 PM     The patient was seen and examined with Dr. Barrera.  I have reviewed and agree with the above note.  At this time, would recommend that she go to her birth classes as planned, it is ok to be out in the community, just follow the current guidelines-frequent handwashing, stay home if feeling ill.  Recommended she look into birth to 6 months ECFE classes-she is in [facility]-to help build a support network postpartum if her parents are not able to travel here after the baby is born.      Raisa Adam MD, FACOG          "

## 2020-03-03 ASSESSMENT — ANXIETY QUESTIONNAIRES: GAD7 TOTAL SCORE: 1

## 2020-03-11 ENCOUNTER — HEALTH MAINTENANCE LETTER (OUTPATIENT)
Age: 30
End: 2020-03-11

## 2020-03-16 ENCOUNTER — OFFICE VISIT (OUTPATIENT)
Dept: OBGYN | Facility: CLINIC | Age: 30
End: 2020-03-16
Attending: OBSTETRICS & GYNECOLOGY
Payer: COMMERCIAL

## 2020-03-16 VITALS
HEART RATE: 78 BPM | SYSTOLIC BLOOD PRESSURE: 100 MMHG | BODY MASS INDEX: 26.35 KG/M2 | DIASTOLIC BLOOD PRESSURE: 67 MMHG | WEIGHT: 153.5 LBS

## 2020-03-16 DIAGNOSIS — Z34.03 SUPERVISION OF NORMAL FIRST PREGNANCY IN THIRD TRIMESTER: Primary | ICD-10-CM

## 2020-03-16 PROCEDURE — G0463 HOSPITAL OUTPT CLINIC VISIT: HCPCS | Mod: ZF

## 2020-03-16 NOTE — NURSING NOTE
Chief Complaint   Patient presents with     Prenatal Care     31w5d       See SHORTY Lemons 3/16/2020

## 2020-03-16 NOTE — LETTER
3/16/2020       RE: Hakeem Meade  [address] [place] [address]     Dear Colleague,    Thank you for referring your patient, Hakeem Meade, to the [facility] at [facility]. Please see a copy of my visit note below.    S:  Doing well, good FM, no pregnancy concerns.  Continues to worry about coronavirus and is largely self isolating at home (presents today with her -both wearing masks and gloves)    O: see flow    A:  30 y/o  at 31+5 weeks, doing well    P:  Reassurance given that she is doing all she can now to stay healthy.  For now plan to RTC in 2 weeks, can bump out to 4 weeks if needed depending on hospital/appointment situation in 2 weeks.    Raisa Adam MD, FACOG

## 2020-03-17 NOTE — PROGRESS NOTES
S:  Doing well, good FM, no pregnancy concerns.  Continues to worry about coronavirus and is largely self isolating at home (presents today with her -both wearing masks and gloves)    O: see flow    A:  28 y/o  at 31+5 weeks, doing well    P:  Reassurance given that she is doing all she can now to stay healthy.  For now plan to RTC in 2 weeks, can bump out to 4 weeks if needed depending on hospital/appointment situation in 2 weeks.    Raisa Adam MD, FACOG

## 2020-03-30 ENCOUNTER — OFFICE VISIT (OUTPATIENT)
Dept: OBGYN | Facility: CLINIC | Age: 30
End: 2020-03-30
Attending: OBSTETRICS & GYNECOLOGY
Payer: COMMERCIAL

## 2020-03-30 DIAGNOSIS — Z34.03 SUPERVISION OF NORMAL FIRST PREGNANCY IN THIRD TRIMESTER: Primary | ICD-10-CM

## 2020-03-30 NOTE — PROGRESS NOTES
"SUBJECTIVE     Hakeem Meade is a 29 year old female who is being evaluated via a billable telephone visit.    Patient opted to conduct today's return visit via telephone secondary to the COVID-19 pandemic vs. an in person visit to the clinic.    I spoke with: Hakeem Meade    The patient has been notified of following:   \"This telephone visit will be conducted via a call between you and your physician/provider. We have found that certain health care needs can be provided without the need for a physical exam.  This service lets us provide the care you need with a short phone conversation.  If a prescription is necessary we can send it directly to your pharmacy.  If lab work is needed we can place an order for that and you can then stop by our lab to have the test done at a later time.  If during the course of the call the physician/provider feels a telephone visit is not appropriate, you will not be charged for this service.\"     The reason for the telephone visit:  Prenatal care.  3rd trimester.  Occ tightening, no regular contractions.  No LOF or bleeding. +FM.    Some hand dryness w/ occasional itching.      Some increased urinary frequency. No dysuria.      Past Medical History  Past Medical History:   Diagnosis Date     NO ACTIVE PROBLEMS        Allergies  Cefradine [cephradine]    I have reviewed and updated the patient's, Social History, Family History and Medication List.    ASSESSMENT   Hakeem Meade is a 29 year old , telephone visit for prenatal care    PLAN     1. PNC:    2. Due date: 2020  3. ABO/Rh: B+  4. Antibody screen: neg  5. Hgb: 13  6. Rubella: immune  7. HepB: NR  8. HIV: NR  9. RPR: NR  10. GC/CT: neg/neg  11. GCT: 91  12. Tdap: done  13. GBS:  NEXT VISIT  14. Flu vaccine: done  15. Aneuploidy screening: normal NIPT, NT   16. Anatomy scan: normal  17. Infant feeding plan: breast       2. Follow-up:  2 wks for GBS. Discussed visitor restrictions on l and d.       Discussed visitor restrictions " for L and D/NICU.  Also discussed the clinic policy during COVID restrictions.   Phone call duration:  13 minutes      Dayami Arriaga MD, FACOG  [facility]  OB/GYN    3/30/2020  11:29 AM

## 2020-03-30 NOTE — LETTER
"3/30/2020       RE: Hakeem Meade  [address] [place] [address]     Dear Colleague,    Thank you for referring your patient, Hakeem Meade, to the [facility] at [facility]. Please see a copy of my visit note below.    SUBJECTIVE     Hakeem Meade is a 29 year old female who is being evaluated via a billable telephone visit.    Patient opted to conduct today's return visit via telephone secondary to the COVID-19 pandemic vs. an in person visit to the clinic.    I spoke with: Hakeem Meade    The patient has been notified of following:   \"This telephone visit will be conducted via a call between you and your physician/provider. We have found that certain health care needs can be provided without the need for a physical exam.  This service lets us provide the care you need with a short phone conversation.  If a prescription is necessary we can send it directly to your pharmacy.  If lab work is needed we can place an order for that and you can then stop by our lab to have the test done at a later time.  If during the course of the call the physician/provider feels a telephone visit is not appropriate, you will not be charged for this service.\"     The reason for the telephone visit:  Prenatal care.  3rd trimester.  Occ tightening, no regular contractions.  No LOF or bleeding. +FM.    Some hand dryness w/ occasional itching.      Some increased urinary frequency. No dysuria.      Past Medical History  Past Medical History:   Diagnosis Date     NO ACTIVE PROBLEMS        Allergies  Cefradine [cephradine]    I have reviewed and updated the patient's, Social History, Family History and Medication List.    ASSESSMENT   Hakeem Meade is a 29 year old , telephone visit for prenatal care    PLAN     1. PNC:    2. Due date: 2020  3. ABO/Rh: B+  4. Antibody screen: neg  5. Hgb: 13  6. Rubella: immune  7. HepB: NR  8. HIV: NR  9. RPR: NR  10. GC/CT: neg/neg  11. GCT: " 91  12. Tdap: done  13. GBS:  NEXT VISIT  14. Flu vaccine: done  15. Aneuploidy screening: normal NIPT, NT   16. Anatomy scan: normal  17. Infant feeding plan: breast       2. Follow-up:  2 wks for GBS. Discussed visitor restrictions on l and d.       Discussed visitor restrictions for L and D/NICU.  Also discussed the clinic policy during COVID restrictions.   Phone call duration:  13 minutes      Dayami Arriaga MD, FACOG  [facility]  OB/GYN    3/30/2020  11:29 AM

## 2020-04-15 ENCOUNTER — ANCILLARY PROCEDURE (OUTPATIENT)
Dept: ULTRASOUND IMAGING | Facility: CLINIC | Age: 30
End: 2020-04-15
Attending: OBSTETRICS & GYNECOLOGY
Payer: COMMERCIAL

## 2020-04-15 ENCOUNTER — OFFICE VISIT (OUTPATIENT)
Dept: OBGYN | Facility: CLINIC | Age: 30
End: 2020-04-15
Attending: OBSTETRICS & GYNECOLOGY
Payer: COMMERCIAL

## 2020-04-15 VITALS
HEART RATE: 112 BPM | BODY MASS INDEX: 27.65 KG/M2 | DIASTOLIC BLOOD PRESSURE: 79 MMHG | SYSTOLIC BLOOD PRESSURE: 120 MMHG | WEIGHT: 161.1 LBS

## 2020-04-15 DIAGNOSIS — Z34.83 ENCOUNTER FOR SUPERVISION OF OTHER NORMAL PREGNANCY, THIRD TRIMESTER: Primary | ICD-10-CM

## 2020-04-15 PROCEDURE — G0463 HOSPITAL OUTPT CLINIC VISIT: HCPCS | Mod: ZF

## 2020-04-15 PROCEDURE — 76815 OB US LIMITED FETUS(S): CPT

## 2020-04-15 PROCEDURE — 87653 STREP B DNA AMP PROBE: CPT | Performed by: OBSTETRICS & GYNECOLOGY

## 2020-04-15 ASSESSMENT — ANXIETY QUESTIONNAIRES
6. BECOMING EASILY ANNOYED OR IRRITABLE: NOT AT ALL
3. WORRYING TOO MUCH ABOUT DIFFERENT THINGS: NOT AT ALL
5. BEING SO RESTLESS THAT IT IS HARD TO SIT STILL: NOT AT ALL
GAD7 TOTAL SCORE: 0
1. FEELING NERVOUS, ANXIOUS, OR ON EDGE: NOT AT ALL
7. FEELING AFRAID AS IF SOMETHING AWFUL MIGHT HAPPEN: NOT AT ALL
2. NOT BEING ABLE TO STOP OR CONTROL WORRYING: NOT AT ALL

## 2020-04-15 ASSESSMENT — PATIENT HEALTH QUESTIONNAIRE - PHQ9
5. POOR APPETITE OR OVEREATING: NOT AT ALL
SUM OF ALL RESPONSES TO PHQ QUESTIONS 1-9: 0

## 2020-04-15 NOTE — NURSING NOTE
Chief Complaint   Patient presents with     Prenatal Care     36w0d       See SHORTY Lemons 4/15/2020

## 2020-04-15 NOTE — LETTER
4/15/2020       RE: Hakeem Meade  [address] [place] [address]     Dear Colleague,    Thank you for referring your patient, Hakeem Meade, to the [facility] at [facility]. Please see a copy of my visit note below.    OB Note    S: Feeling well. Good movement. Occ cramping. No lof, vb. Denies headache or heartburn. Nervous about baby position.    O: /79 (BP Location: Right arm, Patient Position: Chair)   Pulse 112   Wt 73.1 kg (161 lb 1.6 oz)   LMP 2019 (Exact Date)   Breastfeeding No   BMI 27.65 kg/m      SVE: /-3 cephalic    A/P: 29 year old  @36w0d   GBS today  Reviewed pediatrician options  Discussed pain medication in labor- plans epidural  US for position- cephalic  Labor precautions reviewed    - Reinforced COVID-19 precautions including: social distancing of at least 6 feet, avoiding gatherings of 10 persons or more, frequent hand hygiene, avoiding discretionary travel, avoiding touching of face, and cleaning and disinfecting frequently touched surfaces daily.  Encouraged household members to follow the same guidelines.    - If suspected exposure to COVID-19 or onset of fever and symptoms, such as cough or difficulty breathing visit [email or web address] promptly to be directed to the appropriate care and, if pregnant, call [phone] to notify your healthcare team.       - Reviewed cohort scheduling of ObGyns, CNMS, and residents which may cause a change in provider at future scheduled clinic appointments.    - Reinforced current hospital policy restricting visitors to 1 healthy adult (age >18)  for the entire duration of stay on labor and delivery and postpartum, and one healthy adult at a time in the NICU.  At present, doulas are NOT excluded from this restriction.  Also, reinforced clinic visitor policy restricting any visitors from attending office visits to limit the spread of COVID-19.    - Recommended once daily PO  supplements of Ferrous Sulfate 325mg, Vitamin C 250 mg, and Vitamin B12 1000 mcg. Take the 3 supplements together, vitamin C and B12 help with absorption.  - Notified we are no longer able to support waterbirth and not able to offer nitrous oxide during labor    Tomeka Salazar MD

## 2020-04-15 NOTE — PROGRESS NOTES
OB Note    S: Feeling well. Good movement. Occ cramping. No lof, vb. Denies headache or heartburn. Nervous about baby position.    O: /79 (BP Location: Right arm, Patient Position: Chair)   Pulse 112   Wt 73.1 kg (161 lb 1.6 oz)   LMP 2019 (Exact Date)   Breastfeeding No   BMI 27.65 kg/m      SVE: /-3 cephalic    A/P: 29 year old  @36w0d   GBS today  Reviewed pediatrician options  Discussed pain medication in labor- plans epidural  US for position- cephalic  Labor precautions reviewed    - Reinforced COVID-19 precautions including: social distancing of at least 6 feet, avoiding gatherings of 10 persons or more, frequent hand hygiene, avoiding discretionary travel, avoiding touching of face, and cleaning and disinfecting frequently touched surfaces daily.  Encouraged household members to follow the same guidelines.    - If suspected exposure to COVID-19 or onset of fever and symptoms, such as cough or difficulty breathing visit [email or web address] promptly to be directed to the appropriate care and, if pregnant, call [phone] to notify your healthcare team.       - Reviewed cohort scheduling of ObGyns, CNMS, and residents which may cause a change in provider at future scheduled clinic appointments.    - Reinforced current hospital policy restricting visitors to 1 healthy adult (age >18)  for the entire duration of stay on labor and delivery and postpartum, and one healthy adult at a time in the NICU.  At present, doulas are NOT excluded from this restriction.  Also, reinforced clinic visitor policy restricting any visitors from attending office visits to limit the spread of COVID-19.    - Recommended once daily PO supplements of Ferrous Sulfate 325mg, Vitamin C 250 mg, and Vitamin B12 1000 mcg. Take the 3 supplements together, vitamin C and B12 help with absorption.  - Notified we are no longer able to support waterbirth and not able to offer nitrous oxide during labor    Tomeka Campuzano  MD [place]

## 2020-04-16 LAB
GP B STREP DNA SPEC QL NAA+PROBE: NEGATIVE
SPECIMEN SOURCE: NORMAL

## 2020-04-16 ASSESSMENT — ANXIETY QUESTIONNAIRES: GAD7 TOTAL SCORE: 0

## 2020-04-17 ENCOUNTER — TELEPHONE (OUTPATIENT)
Dept: OBGYN | Facility: CLINIC | Age: 30
End: 2020-04-17

## 2020-04-17 NOTE — TELEPHONE ENCOUNTER
Spoke with Hakeem who is 36 weeks and is calling to report dark brown discharge that is occurring. Denies any bright red bleeding or watery discharge. She states baby is moving normally. She was seen 2 days ago and had her cervix checked. Nurse informed patient that brown discharge is not concerning after cervical exam. Asked her to closely monitor her symptoms. Call back if turns to bright red or watery. Also call back if FM decreases. Patient agreeable.

## 2020-04-21 ENCOUNTER — MYC MEDICAL ADVICE (OUTPATIENT)
Dept: OBGYN | Facility: CLINIC | Age: 30
End: 2020-04-21

## 2020-04-22 ENCOUNTER — VIRTUAL VISIT (OUTPATIENT)
Dept: OBGYN | Facility: CLINIC | Age: 30
End: 2020-04-22
Attending: OBSTETRICS & GYNECOLOGY
Payer: COMMERCIAL

## 2020-04-22 DIAGNOSIS — Z34.90 NORMAL INTRAUTERINE PREGNANCY, ANTEPARTUM: ICD-10-CM

## 2020-04-22 PROBLEM — O21.9 NAUSEA AND VOMITING IN PREGNANCY: Status: RESOLVED | Noted: 2019-10-29 | Resolved: 2020-04-22

## 2020-04-22 NOTE — PROGRESS NOTES
"[facility] TELEPHONE VISIT    SUBJECTIVE     Hakeem Meade is a 29 year old female who is being evaluated via a billable telephone visit.    Patient opted to conduct today's return visit via telephone secondary to the COVID-19 pandemic vs. an in person visit to the clinic.    I spoke with: Hakeem Meade    The patient has been notified of following:   \"This telephone visit will be conducted via a call between you and your physician/provider. We have found that certain health care needs can be provided without the need for a physical exam.  This service lets us provide the care you need with a short phone conversation.  If a prescription is necessary we can send it directly to your pharmacy.  If lab work is needed we can place an order for that and you can then stop by our lab to have the test done at a later time.  If during the course of the call the physician/provider feels a telephone visit is not appropriate, you will not be charged for this service.\"     The reason for the telephone visit: BROCK- 37wks    Feeling well. Good movement. No ctx, lof, vb. Some bladder pressure, no pain with urination. Denies headache, swelling. Some numbness in right hand, stable.      ASSESSMENT   Hakeem Meade is a 29 year old  @37w0d, telephone visit for prenatal care.    PLAN   GBS negative  Will consider carpal tunnel wrist brace if needed  Reviewed labor symptoms, when to call  Reviewed further prenatal visits in clinic- IOL by 41wks  Discharge meds sent to pharmacy     - Reinforced COVID-19 precautions including: social distancing of at least 6 feet, avoiding gatherings of 10 persons or more, frequent hand hygiene, avoiding discretionary travel, avoiding touching of face, and cleaning and disinfecting frequently touched surfaces daily.  Encouraged household members to follow the same guidelines.    - If suspected exposure to COVID-19 or onset of fever and symptoms, such as cough or difficulty breathing visit [email or web address] promptly to " be directed to the appropriate care and, if pregnant, call [phone] to notify your healthcare team.       - Reviewed cohort scheduling of ObGyns, CNMS, and residents which may cause a change in provider at future scheduled clinic appointments.    - Reinforced current hospital policy restricting visitors to 1 healthy adult (age >18)  for the entire duration of stay on labor and delivery and postpartum, and one healthy adult at a time in the NICU.  At present, doulas are NOT excluded from this restriction.  Also, reinforced clinic visitor policy restricting any visitors from attending office visits to limit the spread of COVID-19.    - Recommended once daily PO supplements of Ferrous Sulfate 325mg, Vitamin C 250 mg, and Vitamin B12 1000 mcg. Take the 3 supplements together, vitamin C and B12 help with absorption.  - Notified we are no longer able to support waterbirth and not able to offer nitrous oxide during labor    Phone call start: 8:14  Phone call end: 8:25  Phone call duration:  11 minutes    Tomeka Salazar MD

## 2020-04-29 ENCOUNTER — OFFICE VISIT (OUTPATIENT)
Dept: OBGYN | Facility: CLINIC | Age: 30
End: 2020-04-29
Attending: OBSTETRICS & GYNECOLOGY
Payer: COMMERCIAL

## 2020-04-29 VITALS
HEIGHT: 64 IN | DIASTOLIC BLOOD PRESSURE: 78 MMHG | SYSTOLIC BLOOD PRESSURE: 112 MMHG | BODY MASS INDEX: 27.71 KG/M2 | WEIGHT: 162.3 LBS

## 2020-04-29 DIAGNOSIS — Z34.90 NORMAL INTRAUTERINE PREGNANCY, ANTEPARTUM: Primary | ICD-10-CM

## 2020-04-29 ASSESSMENT — MIFFLIN-ST. JEOR: SCORE: 1436.19

## 2020-04-29 NOTE — PROGRESS NOTES
28 yo  at 38 weeks here for routine PNV.   Pregnancy complicated by:  Patient Active Problem List   Diagnosis     history of cystic hygroma     Normal intrauterine pregnancy,      History of termination of pregnancy -  Increased risk of Monosomy X (Christine Syndrome) based on cell-free DNA screen      Family history of heart block     Doing well  Reviewed COVID 19 recommendations and guidelines for admission and partner  Reviewed si/sx labor  Reviewed induction at 41 weeks if undelivered  All questions answered  Jacy Sosa MD

## 2020-04-29 NOTE — LETTER
2020       RE: Hakeem Meade  [address] [place] [address]     Dear Colleague,    Thank you for referring your patient, Hakeem Meade, to the [facility] at [facility]. Please see a copy of my visit note below.    30 yo  at 38 weeks here for routine PNV.   Pregnancy complicated by:  Patient Active Problem List   Diagnosis     history of cystic hygroma     Normal intrauterine pregnancy,      History of termination of pregnancy -  Increased risk of Monosomy X (Christine Syndrome) based on cell-free DNA screen      Family history of heart block     Doing well  Reviewed COVID 19 recommendations and guidelines for admission and partner  Reviewed si/sx labor  Reviewed induction at 41 weeks if undelivered  All questions answered  Jacy Sosa MD      Again, thank you for allowing me to participate in the care of your patient.      Sincerely,    Jacy Sosa MD

## 2020-05-06 ENCOUNTER — OFFICE VISIT (OUTPATIENT)
Dept: OBGYN | Facility: CLINIC | Age: 30
End: 2020-05-06
Attending: OBSTETRICS & GYNECOLOGY
Payer: COMMERCIAL

## 2020-05-06 VITALS
WEIGHT: 160.9 LBS | HEART RATE: 76 BPM | BODY MASS INDEX: 27.47 KG/M2 | SYSTOLIC BLOOD PRESSURE: 115 MMHG | DIASTOLIC BLOOD PRESSURE: 76 MMHG | HEIGHT: 64 IN

## 2020-05-06 DIAGNOSIS — Z34.90 NORMAL INTRAUTERINE PREGNANCY, ANTEPARTUM: Primary | ICD-10-CM

## 2020-05-06 ASSESSMENT — MIFFLIN-ST. JEOR: SCORE: 1429.84

## 2020-05-06 NOTE — LETTER
2020       RE: Hakeem Meade  [address] [place] [address]     Dear Colleague,    Thank you for referring your patient, Hakeem Meade, to the [facility] at [facility]. Please see a copy of my visit note below.    30 yo  at 39 weeks here for routine PNV.   Dropped!!  Preg complicated by:  Patient Active Problem List   Diagnosis     history of cystic hygroma     Normal intrauterine pregnancy,      History of termination of pregnancy -  Increased risk of Monosomy X (Christine Syndrome) based on cell-free DNA screen      Family history of heart block     Doing well  Discussed options for induction. Desires to wait until 40 w or after. Arranged BPP/ZARA next visit to aid in discussion of induction date. Discussed ok to wait few days or up to one week past 40 weeks to await spontaneous labor.   Jacy Sosa MD

## 2020-05-06 NOTE — PROGRESS NOTES
28 yo  at 39 weeks here for routine PNV.   Dropped!!  Preg complicated by:  Patient Active Problem List   Diagnosis     history of cystic hygroma     Normal intrauterine pregnancy,      History of termination of pregnancy -  Increased risk of Monosomy X (Christine Syndrome) based on cell-free DNA screen      Family history of heart block     Doing well  Discussed options for induction. Desires to wait until 40 w or after. Arranged BPP/ZARA next visit to aid in discussion of induction date. Discussed ok to wait few days or up to one week past 40 weeks to await spontaneous labor.   Jacy Sosa MD

## 2020-05-09 ENCOUNTER — ANESTHESIA EVENT (OUTPATIENT)
Dept: OBGYN | Facility: CLINIC | Age: 30
End: 2020-05-09
Payer: COMMERCIAL

## 2020-05-09 ENCOUNTER — ANESTHESIA (OUTPATIENT)
Dept: OBGYN | Facility: CLINIC | Age: 30
End: 2020-05-09
Payer: COMMERCIAL

## 2020-05-09 ENCOUNTER — HOSPITAL ENCOUNTER (INPATIENT)
Facility: CLINIC | Age: 30
LOS: 2 days | Discharge: HOME HEALTH | End: 2020-05-11
Attending: OBSTETRICS & GYNECOLOGY | Admitting: OBSTETRICS & GYNECOLOGY
Payer: COMMERCIAL

## 2020-05-09 PROBLEM — Z34.90 PREGNANCY: Status: ACTIVE | Noted: 2020-05-09

## 2020-05-09 LAB
ABO + RH BLD: NORMAL
ABO + RH BLD: NORMAL
BLD GP AB SCN SERPL QL: NORMAL
BLOOD BANK CMNT PATIENT-IMP: NORMAL
HGB BLD-MCNC: 13.3 G/DL (ref 11.7–15.7)
PLATELET # BLD AUTO: 154 10E9/L (ref 150–450)
SPECIMEN EXP DATE BLD: NORMAL

## 2020-05-09 PROCEDURE — 85049 AUTOMATED PLATELET COUNT: CPT | Performed by: OBSTETRICS & GYNECOLOGY

## 2020-05-09 PROCEDURE — 25000128 H RX IP 250 OP 636: Performed by: ANESTHESIOLOGY

## 2020-05-09 PROCEDURE — 88307 TISSUE EXAM BY PATHOLOGIST: CPT | Mod: 26 | Performed by: OBSTETRICS & GYNECOLOGY

## 2020-05-09 PROCEDURE — 25800030 ZZH RX IP 258 OP 636: Performed by: STUDENT IN AN ORGANIZED HEALTH CARE EDUCATION/TRAINING PROGRAM

## 2020-05-09 PROCEDURE — 86780 TREPONEMA PALLIDUM: CPT | Performed by: OBSTETRICS & GYNECOLOGY

## 2020-05-09 PROCEDURE — 86850 RBC ANTIBODY SCREEN: CPT | Performed by: OBSTETRICS & GYNECOLOGY

## 2020-05-09 PROCEDURE — 00HU33Z INSERTION OF INFUSION DEVICE INTO SPINAL CANAL, PERCUTANEOUS APPROACH: ICD-10-PCS | Performed by: ANESTHESIOLOGY

## 2020-05-09 PROCEDURE — 85018 HEMOGLOBIN: CPT | Performed by: OBSTETRICS & GYNECOLOGY

## 2020-05-09 PROCEDURE — 88307 TISSUE EXAM BY PATHOLOGIST: CPT | Performed by: OBSTETRICS & GYNECOLOGY

## 2020-05-09 PROCEDURE — 25000125 ZZHC RX 250

## 2020-05-09 PROCEDURE — 25000125 ZZHC RX 250: Performed by: ANESTHESIOLOGY

## 2020-05-09 PROCEDURE — 87635 SARS-COV-2 COVID-19 AMP PRB: CPT | Performed by: STUDENT IN AN ORGANIZED HEALTH CARE EDUCATION/TRAINING PROGRAM

## 2020-05-09 PROCEDURE — 25000128 H RX IP 250 OP 636: Performed by: STUDENT IN AN ORGANIZED HEALTH CARE EDUCATION/TRAINING PROGRAM

## 2020-05-09 PROCEDURE — 86901 BLOOD TYPING SEROLOGIC RH(D): CPT | Performed by: OBSTETRICS & GYNECOLOGY

## 2020-05-09 PROCEDURE — 72200001 ZZH LABOR CARE VAGINAL DELIVERY SINGLE

## 2020-05-09 PROCEDURE — 0KQM0ZZ REPAIR PERINEUM MUSCLE, OPEN APPROACH: ICD-10-PCS | Performed by: OBSTETRICS & GYNECOLOGY

## 2020-05-09 PROCEDURE — 25000125 ZZHC RX 250: Performed by: STUDENT IN AN ORGANIZED HEALTH CARE EDUCATION/TRAINING PROGRAM

## 2020-05-09 PROCEDURE — G0463 HOSPITAL OUTPT CLINIC VISIT: HCPCS

## 2020-05-09 PROCEDURE — 10907ZC DRAINAGE OF AMNIOTIC FLUID, THERAPEUTIC FROM PRODUCTS OF CONCEPTION, VIA NATURAL OR ARTIFICIAL OPENING: ICD-10-PCS | Performed by: OBSTETRICS & GYNECOLOGY

## 2020-05-09 PROCEDURE — 86900 BLOOD TYPING SEROLOGIC ABO: CPT | Performed by: OBSTETRICS & GYNECOLOGY

## 2020-05-09 PROCEDURE — 12000001 ZZH R&B MED SURG/OB UMMC

## 2020-05-09 PROCEDURE — 3E0R3BZ INTRODUCTION OF ANESTHETIC AGENT INTO SPINAL CANAL, PERCUTANEOUS APPROACH: ICD-10-PCS | Performed by: ANESTHESIOLOGY

## 2020-05-09 RX ORDER — LIDOCAINE HYDROCHLORIDE 10 MG/ML
INJECTION, SOLUTION EPIDURAL; INFILTRATION; INTRACAUDAL; PERINEURAL
Status: DISCONTINUED
Start: 2020-05-09 | End: 2020-05-10 | Stop reason: WASHOUT

## 2020-05-09 RX ORDER — OXYCODONE AND ACETAMINOPHEN 5; 325 MG/1; MG/1
1 TABLET ORAL
Status: DISCONTINUED | OUTPATIENT
Start: 2020-05-09 | End: 2020-05-10

## 2020-05-09 RX ORDER — OXYTOCIN/0.9 % SODIUM CHLORIDE 30/500 ML
100-340 PLASTIC BAG, INJECTION (ML) INTRAVENOUS CONTINUOUS PRN
Status: COMPLETED | OUTPATIENT
Start: 2020-05-09 | End: 2020-05-09

## 2020-05-09 RX ORDER — FENTANYL CITRATE 50 UG/ML
50-100 INJECTION, SOLUTION INTRAMUSCULAR; INTRAVENOUS
Status: DISCONTINUED | OUTPATIENT
Start: 2020-05-09 | End: 2020-05-10

## 2020-05-09 RX ORDER — OXYTOCIN/0.9 % SODIUM CHLORIDE 30/500 ML
PLASTIC BAG, INJECTION (ML) INTRAVENOUS
Status: DISCONTINUED
Start: 2020-05-09 | End: 2020-05-10 | Stop reason: HOSPADM

## 2020-05-09 RX ORDER — ONDANSETRON 2 MG/ML
4 INJECTION INTRAMUSCULAR; INTRAVENOUS EVERY 6 HOURS PRN
Status: DISCONTINUED | OUTPATIENT
Start: 2020-05-09 | End: 2020-05-10

## 2020-05-09 RX ORDER — ONDANSETRON 4 MG/1
4 TABLET, ORALLY DISINTEGRATING ORAL EVERY 6 HOURS PRN
Status: DISCONTINUED | OUTPATIENT
Start: 2020-05-09 | End: 2020-05-10

## 2020-05-09 RX ORDER — METHYLERGONOVINE MALEATE 0.2 MG/ML
200 INJECTION INTRAVENOUS
Status: DISCONTINUED | OUTPATIENT
Start: 2020-05-09 | End: 2020-05-10

## 2020-05-09 RX ORDER — IBUPROFEN 800 MG/1
800 TABLET, FILM COATED ORAL
Status: DISCONTINUED | OUTPATIENT
Start: 2020-05-09 | End: 2020-05-10

## 2020-05-09 RX ORDER — LIDOCAINE HYDROCHLORIDE AND EPINEPHRINE 15; 5 MG/ML; UG/ML
INJECTION, SOLUTION EPIDURAL PRN
OUTPATIENT
Start: 2020-05-09

## 2020-05-09 RX ORDER — OXYTOCIN 10 [USP'U]/ML
10 INJECTION, SOLUTION INTRAMUSCULAR; INTRAVENOUS
Status: DISCONTINUED | OUTPATIENT
Start: 2020-05-09 | End: 2020-05-10

## 2020-05-09 RX ORDER — PROCHLORPERAZINE 25 MG
25 SUPPOSITORY, RECTAL RECTAL EVERY 12 HOURS PRN
Status: DISCONTINUED | OUTPATIENT
Start: 2020-05-09 | End: 2020-05-10

## 2020-05-09 RX ORDER — SODIUM CHLORIDE, SODIUM LACTATE, POTASSIUM CHLORIDE, CALCIUM CHLORIDE 600; 310; 30; 20 MG/100ML; MG/100ML; MG/100ML; MG/100ML
INJECTION, SOLUTION INTRAVENOUS CONTINUOUS
Status: DISCONTINUED | OUTPATIENT
Start: 2020-05-09 | End: 2020-05-10

## 2020-05-09 RX ORDER — ACETAMINOPHEN 325 MG/1
650 TABLET ORAL EVERY 4 HOURS PRN
Status: DISCONTINUED | OUTPATIENT
Start: 2020-05-09 | End: 2020-05-10

## 2020-05-09 RX ORDER — EPHEDRINE SULFATE 50 MG/ML
5 INJECTION, SOLUTION INTRAMUSCULAR; INTRAVENOUS; SUBCUTANEOUS
Status: DISCONTINUED | OUTPATIENT
Start: 2020-05-09 | End: 2020-05-10

## 2020-05-09 RX ORDER — OXYTOCIN 10 [USP'U]/ML
INJECTION, SOLUTION INTRAMUSCULAR; INTRAVENOUS
Status: DISCONTINUED
Start: 2020-05-09 | End: 2020-05-10 | Stop reason: WASHOUT

## 2020-05-09 RX ORDER — FENTANYL CITRATE 50 UG/ML
25 INJECTION, SOLUTION INTRAMUSCULAR; INTRAVENOUS ONCE
Status: COMPLETED | OUTPATIENT
Start: 2020-05-09 | End: 2020-05-09

## 2020-05-09 RX ORDER — NALOXONE HYDROCHLORIDE 0.4 MG/ML
.1-.4 INJECTION, SOLUTION INTRAMUSCULAR; INTRAVENOUS; SUBCUTANEOUS
Status: DISCONTINUED | OUTPATIENT
Start: 2020-05-09 | End: 2020-05-10

## 2020-05-09 RX ORDER — MISOPROSTOL 200 UG/1
TABLET ORAL
Status: DISCONTINUED
Start: 2020-05-09 | End: 2020-05-10 | Stop reason: HOSPADM

## 2020-05-09 RX ORDER — CITRIC ACID/SODIUM CITRATE 334-500MG
30 SOLUTION, ORAL ORAL ONCE
Status: DISCONTINUED | OUTPATIENT
Start: 2020-05-09 | End: 2020-05-10

## 2020-05-09 RX ORDER — FENTANYL/BUPIVACAINE/NS/PF 2-1250MCG
PLASTIC BAG, INJECTION (ML) INJECTION
Status: COMPLETED
Start: 2020-05-09 | End: 2020-05-09

## 2020-05-09 RX ORDER — NALBUPHINE HYDROCHLORIDE 20 MG/ML
2.5-5 INJECTION, SOLUTION INTRAMUSCULAR; INTRAVENOUS; SUBCUTANEOUS EVERY 6 HOURS PRN
Status: DISCONTINUED | OUTPATIENT
Start: 2020-05-09 | End: 2020-05-10

## 2020-05-09 RX ORDER — EPHEDRINE SULFATE 50 MG/ML
INJECTION, SOLUTION INTRAMUSCULAR; INTRAVENOUS; SUBCUTANEOUS
Status: COMPLETED
Start: 2020-05-09 | End: 2020-05-09

## 2020-05-09 RX ORDER — METOCLOPRAMIDE HYDROCHLORIDE 5 MG/ML
10 INJECTION INTRAMUSCULAR; INTRAVENOUS EVERY 6 HOURS PRN
Status: DISCONTINUED | OUTPATIENT
Start: 2020-05-09 | End: 2020-05-10

## 2020-05-09 RX ORDER — CARBOPROST TROMETHAMINE 250 UG/ML
250 INJECTION, SOLUTION INTRAMUSCULAR
Status: DISCONTINUED | OUTPATIENT
Start: 2020-05-09 | End: 2020-05-10

## 2020-05-09 RX ADMIN — FENTANYL CITRATE 50 MCG: 0.05 INJECTION, SOLUTION INTRAMUSCULAR; INTRAVENOUS at 19:17

## 2020-05-09 RX ADMIN — Medication 5 MG: at 19:53

## 2020-05-09 RX ADMIN — LIDOCAINE HYDROCHLORIDE,EPINEPHRINE BITARTRATE 3 ML: 15; .005 INJECTION, SOLUTION EPIDURAL; INFILTRATION; INTRACAUDAL; PERINEURAL at 19:54

## 2020-05-09 RX ADMIN — Medication 12 ML/HR: at 19:56

## 2020-05-09 RX ADMIN — Medication 340 ML/HR: at 23:15

## 2020-05-09 RX ADMIN — SODIUM CHLORIDE, POTASSIUM CHLORIDE, SODIUM LACTATE AND CALCIUM CHLORIDE 1000 ML: 600; 310; 30; 20 INJECTION, SOLUTION INTRAVENOUS at 18:15

## 2020-05-09 RX ADMIN — FENTANYL CITRATE 25 MCG: 50 INJECTION, SOLUTION INTRAMUSCULAR; INTRAVENOUS at 19:55

## 2020-05-09 ASSESSMENT — ACTIVITIES OF DAILY LIVING (ADL)
TOILETING: 0-->INDEPENDENT
SWALLOWING: 0-->SWALLOWS FOODS/LIQUIDS WITHOUT DIFFICULTY
DRESS: 0-->INDEPENDENT
RETIRED_EATING: 0-->INDEPENDENT
TRANSFERRING: 0-->INDEPENDENT
RETIRED_COMMUNICATION: 0-->UNDERSTANDS/COMMUNICATES WITHOUT DIFFICULTY
BATHING: 0-->INDEPENDENT
COGNITION: 0 - NO COGNITION ISSUES REPORTED
FALL_HISTORY_WITHIN_LAST_SIX_MONTHS: NO
AMBULATION: 0-->INDEPENDENT

## 2020-05-09 NOTE — PROGRESS NOTES
Labor Strip Review Note    O:   Patient Vitals for the past 12 hrs:   BP Temp Temp src Resp   20 1740 118/75 98  F (36.7  C) Oral 20     SVE: Deferred, last     FHT: Baseline 155, moderate variability, + accelerations, no decelerations  [place]: contractions every 2-3 minutes    A/P:  Ms. Hakeem Meade is a 29 year old  at 39w3d, admitted for labor, reassuring maternal and fetal status.    1. Labor:expectant management, plan for exam ~  2. Rh positive  3. GBS negative  4. Fetus: Category I FHT, continue EFM and tocometry.  EFW 7lbs.  5. Prenatal hx:  1. Rubella immune  2. Anatomy scan: wnl apart from EIF, low risk NIPT  3. GCT passed at 110  4. Placenta posterior    Juany Dodson MD  OBGYN PGY-4  [phone] (OB PGY3 Pager)  2020  6:57 PM

## 2020-05-09 NOTE — H&P
[facility]  OB History and Physical      Hakeem Meade    MRN# [id number]  YOB: 1990      CC:  contractions    HPI:  Ms. Hakeem Meade is a 29 year old  at 39w3d by LMP consistent with 8w US, who presents with painful contractions. She reports regular fetal movement. She has had a small amount of vaginal bleeding.  She reports small amount of discharge, no gush of fluids.  Pregnancy uncomplicated.    Prenatal Labs:   Lab Results   Component Value Date    ABO B 10/09/2019    RH Pos 10/09/2019    AS Neg 10/09/2019    HEPBANG Nonreactive 10/09/2019    CHPCRT Negative 10/29/2019    GCPCRT Negative 10/29/2019    HGB 12.4 2020       GBS Status:   Lab Results   Component Value Date    GBS Negative 04/15/2020       OB History  OB History    Para Term  AB Living   3 0 0 0 2 0   SAB TAB Ectopic Multiple Live Births   0 1 0 0 0      # Outcome Date GA Lbr Maxime/2nd Weight Sex Delivery Anes PTL Lv   3 Current            2 AB 18 15w0d             Complications: Chromosome anomaly   1 TAB                PMHx:   Past Medical History:   Diagnosis Date     NO ACTIVE PROBLEMS      PSHx:   Past Surgical History:   Procedure Laterality Date     C EACH ADD TOOTH EXTRACTION       DILATION AND EVACUATION N/A 2019    Procedure: DILATION AND EVACUATION;  Surgeon: Meghana Hoyos MD;  Location: [facility]     FOOT SURGERY       NO HISTORY OF SURGERY       Meds:   Medications Prior to Admission   Medication Sig Dispense Refill Last Dose     acetaminophen (TYLENOL) 325 MG tablet Take 2 tablets (650 mg) by mouth every 6 hours as needed for mild pain 90 tablet 1      cholecalciferol (VITAMIN D3) 5000 units TABS tablet Take 5,000 Units by mouth daily        docusate sodium (COLACE) 100 MG tablet Take 1 tablet (100 mg) by mouth 2 times daily as needed for constipation 60 tablet 1      fluticasone (FLONASE) 50 MCG/ACT nasal spray Spray 1 spray into both nostrils daily  (Patient not taking: Reported on 3/16/2020) 16 g 4      ibuprofen (ADVIL/MOTRIN) 600 MG tablet Take 1 tablet (600 mg) by mouth every 6 hours as needed for moderate pain 60 tablet 1      Prenatal Vit-Fe Fumarate-FA (PRENATAL MULTIVITAMIN PLUS IRON) 27-0.8 MG TABS per tablet Take 1 tablet by mouth daily        saline nasal (AYR SALINE) GEL topical gel Apply into each nare 4 times daily as needed for congestion 3 Tube 4      Allergies:    Allergies   Allergen Reactions     Cefradine [Cephradine]      Redness at injection site      FmHx:   Family History   Problem Relation Age of Onset     Diabetes Paternal Grandmother      Heart block Mother         Discovered during her delivery     SocHx: She denies any tobacco, alcohol, or other drug use during this pregnancy.    ROS:   Complete 10-point ROS negative except as noted in HPI.    PE:  Vit:   Patient Vitals for the past 4 hrs:   BP Temp Temp src Resp   20 1740 118/75 98  F (36.7  C) Oral 20      Gen: Well-appearing, NAD, uncomfortable with contractions  CV: Reg rate  Pulm: No increased work of breathing on RA  Abd: Soft, gravid, non-tender  Ext: trace LE edema b/l  Cx: 5/90/0    Pres:  cephalic by bedside US  EFW:  7lbs            FHT: Baseline 150, moderate variability, + accelerations, no decelerations   [place]: 2-3 contractions in 10 minutes      Assessment  Ms. Hakeem Meade is a 29 year old , at 39w3d, who presents with painful contractions, transitional labor, reassuring maternal and fetal status.    Plan  - Labor   - Admit to L&D for labor. Expectant management.   - Fen/GI: Clear liquid diet, IVF   - Pain management: Desires epidural for analgesia   - Anticipate progression      -Fetal Well Being   - Category I FHT. Reactive and reassuring   - Cephalic by BSUS. EFW 7.   - Continue EFM and [place]    - PNC:    - Rh positive. Rubella immune. GBS negative. No intervention indicated.    - Placenta: posterior    The patient was discussed with Dr. Holly who is in  agreement with the treatment plan.    Juany Dodson MD  PGY-4 OB/GYN  [phone] (OB G3 Pager) / [phone] (OB G2 Pager)     Appreciate Dr. Dodson's note above, patient also seen and examined by me. I agree with the note above.   Mirella Holly MD

## 2020-05-09 NOTE — PROVIDER NOTIFICATION
05/09/20 1743   Provider Notification   Provider Name/Title Dr. Dodson   Method of Notification Electronic Page   Request Evaluate in Person   Notification Reason Patient Arrived     Pt arrived. [place] q2-4. Very uncomfortable w/ ctx.

## 2020-05-10 LAB
HGB BLD-MCNC: 11 G/DL (ref 11.7–15.7)
SARS-COV-2 PCR COMMENT: NORMAL
SARS-COV-2 RNA SPEC QL NAA+PROBE: NEGATIVE
SARS-COV-2 RNA SPEC QL NAA+PROBE: NORMAL
SPECIMEN SOURCE: NORMAL
SPECIMEN SOURCE: NORMAL
T PALLIDUM AB SER QL: NONREACTIVE

## 2020-05-10 PROCEDURE — 36415 COLL VENOUS BLD VENIPUNCTURE: CPT | Performed by: OBSTETRICS & GYNECOLOGY

## 2020-05-10 PROCEDURE — 85018 HEMOGLOBIN: CPT | Performed by: OBSTETRICS & GYNECOLOGY

## 2020-05-10 PROCEDURE — 12000001 ZZH R&B MED SURG/OB UMMC

## 2020-05-10 PROCEDURE — 25000132 ZZH RX MED GY IP 250 OP 250 PS 637: Performed by: STUDENT IN AN ORGANIZED HEALTH CARE EDUCATION/TRAINING PROGRAM

## 2020-05-10 RX ORDER — OXYTOCIN 10 [USP'U]/ML
10 INJECTION, SOLUTION INTRAMUSCULAR; INTRAVENOUS
Status: DISCONTINUED | OUTPATIENT
Start: 2020-05-10 | End: 2020-05-11 | Stop reason: HOSPADM

## 2020-05-10 RX ORDER — IBUPROFEN 800 MG/1
800 TABLET, FILM COATED ORAL EVERY 6 HOURS PRN
Status: DISCONTINUED | OUTPATIENT
Start: 2020-05-10 | End: 2020-05-11 | Stop reason: HOSPADM

## 2020-05-10 RX ORDER — OXYTOCIN/0.9 % SODIUM CHLORIDE 30/500 ML
100 PLASTIC BAG, INJECTION (ML) INTRAVENOUS CONTINUOUS
Status: DISCONTINUED | OUTPATIENT
Start: 2020-05-10 | End: 2020-05-11 | Stop reason: HOSPADM

## 2020-05-10 RX ORDER — AMOXICILLIN 250 MG
2 CAPSULE ORAL 2 TIMES DAILY
Status: DISCONTINUED | OUTPATIENT
Start: 2020-05-10 | End: 2020-05-11 | Stop reason: HOSPADM

## 2020-05-10 RX ORDER — MODIFIED LANOLIN
OINTMENT (GRAM) TOPICAL
Status: DISCONTINUED | OUTPATIENT
Start: 2020-05-10 | End: 2020-05-11 | Stop reason: HOSPADM

## 2020-05-10 RX ORDER — OXYTOCIN/0.9 % SODIUM CHLORIDE 30/500 ML
340 PLASTIC BAG, INJECTION (ML) INTRAVENOUS CONTINUOUS PRN
Status: DISCONTINUED | OUTPATIENT
Start: 2020-05-10 | End: 2020-05-11 | Stop reason: HOSPADM

## 2020-05-10 RX ORDER — METHYLERGONOVINE MALEATE 0.2 MG/ML
200 INJECTION INTRAVENOUS
Status: DISCONTINUED | OUTPATIENT
Start: 2020-05-10 | End: 2020-05-11 | Stop reason: HOSPADM

## 2020-05-10 RX ORDER — MISOPROSTOL 200 UG/1
400 TABLET ORAL
Status: DISCONTINUED | OUTPATIENT
Start: 2020-05-10 | End: 2020-05-11 | Stop reason: HOSPADM

## 2020-05-10 RX ORDER — HYDROCORTISONE 2.5 %
CREAM (GRAM) TOPICAL 3 TIMES DAILY PRN
Status: DISCONTINUED | OUTPATIENT
Start: 2020-05-10 | End: 2020-05-11 | Stop reason: HOSPADM

## 2020-05-10 RX ORDER — ACETAMINOPHEN 325 MG/1
650 TABLET ORAL EVERY 4 HOURS PRN
Status: DISCONTINUED | OUTPATIENT
Start: 2020-05-10 | End: 2020-05-11 | Stop reason: HOSPADM

## 2020-05-10 RX ORDER — CARBOPROST TROMETHAMINE 250 UG/ML
250 INJECTION, SOLUTION INTRAMUSCULAR
Status: DISCONTINUED | OUTPATIENT
Start: 2020-05-10 | End: 2020-05-11 | Stop reason: HOSPADM

## 2020-05-10 RX ORDER — BISACODYL 10 MG
10 SUPPOSITORY, RECTAL RECTAL DAILY PRN
Status: DISCONTINUED | OUTPATIENT
Start: 2020-05-11 | End: 2020-05-11 | Stop reason: HOSPADM

## 2020-05-10 RX ORDER — NALOXONE HYDROCHLORIDE 0.4 MG/ML
.1-.4 INJECTION, SOLUTION INTRAMUSCULAR; INTRAVENOUS; SUBCUTANEOUS
Status: DISCONTINUED | OUTPATIENT
Start: 2020-05-10 | End: 2020-05-11 | Stop reason: HOSPADM

## 2020-05-10 RX ORDER — AMOXICILLIN 250 MG
1 CAPSULE ORAL 2 TIMES DAILY
Status: DISCONTINUED | OUTPATIENT
Start: 2020-05-10 | End: 2020-05-11 | Stop reason: HOSPADM

## 2020-05-10 RX ADMIN — SENNOSIDES AND DOCUSATE SODIUM 1 TABLET: 8.6; 5 TABLET ORAL at 09:21

## 2020-05-10 RX ADMIN — ACETAMINOPHEN 650 MG: 325 TABLET, FILM COATED ORAL at 04:50

## 2020-05-10 RX ADMIN — ACETAMINOPHEN 650 MG: 325 TABLET, FILM COATED ORAL at 14:43

## 2020-05-10 RX ADMIN — ACETAMINOPHEN 650 MG: 325 TABLET, FILM COATED ORAL at 19:54

## 2020-05-10 RX ADMIN — SENNOSIDES AND DOCUSATE SODIUM 1 TABLET: 8.6; 5 TABLET ORAL at 19:54

## 2020-05-10 RX ADMIN — ACETAMINOPHEN 650 MG: 325 TABLET, FILM COATED ORAL at 09:22

## 2020-05-10 NOTE — ANESTHESIA PROCEDURE NOTES
Combined Spinal/Epidural procedure note    Staff -   Anesthesiologist:  Varinder Guerrero MD      Performed By: anesthesiologist        Location:  OB  Procedure start time:  5/9/2020 7:35 PM  Procedure end time:  5/9/2020 7:56 PM    Timeout  patient identified, IV checked, site marked, risks and benefits discussed, informed consent, monitors and equipment checked, pre-op evaluation, at physician/surgeon's request and post-op pain management    Correct Patient: Yes    Correct Position: Yes    Correct Site: Yes    Correct Procedure: Yes    Correct Laterality:  N/A  Site Marked:  N/A    Procedure details  Procedure:  Combined Spinal/Epidural (CSE)  Position:  Sitting  Sterile Prep: chloraprep    Local skin infiltration:  2% lidocaine  amount (mL):  3  Approach:  Midline  Epidural Needle Type:  Touhy  Needle gauge (G):  17  Injection Technique:  LORT saline  ALYSSA at (cm):  4.5  Attempts:  1  Redirects:  0  Spinal Needle (G):  27  Spinal Needle Type:  Dick  Catheter threaded easily: Yes    Threaded to skin (cm) :  8  Threaded in epidural space (cm):  3.5  Paresthesias:  No  CSF with Epidural needle: No    CSF with Spinal needle: Yes    Aspiration negative for Heme or CSF: Yes    Test dose (mL):  3  Test dose negative for signs of intravascular, subdural or intrathecal injection: Yes

## 2020-05-10 NOTE — L&D DELIVERY NOTE
Patient progressed to complete after onset of spontaneous labor.  She received an epidural for labor analgesia and initially FHT were category 1.  Once complete fetal status was category 2 with fetal tachycardia with variable decels with contractions.  She pushed effectively to deliver female infant in direct OA presentation over intact perineum. Both shoulders delivered easily and infant was vigorous on delivery.  She was placed on mother's abdomen and delayed cord clamping was done.  Cord was cut and segment set aside.  First APGAR was 9 and cord gases were not sent.  Pitocin was run through the IV after infant delivery and placenta delivered spontaeously with 3 vessel cord.  Placenta was intact.  Immediately following delivery of the placenta a large amount of dark clot passed.  For this reason the placenta was sent to pathology.  Inspection revealed 2nd degree laceration which was repaired with 3-0 vicryl in standard fashion.  No other lacerations noted.  QBL pending, weight pending.  Infant and mom stable in room.    Mirella Holly MD

## 2020-05-10 NOTE — PLAN OF CARE
Temp 99.6 Orally. Denies pain at present. Has voided large amount.   Breast feeding well with minimal staff assist. Tylenol given. Encouraged to drink fluids.

## 2020-05-10 NOTE — PLAN OF CARE
VSS. Postpartum assessment WNL Pain controlled with Tylenol. Breastfeeding with assist positioning. Education provided on hand expression with teach back. EDS completed with a score of 0. Spouse at bedside and supportive. Continue to promote independence with infant cares.

## 2020-05-10 NOTE — ANESTHESIA PREPROCEDURE EVALUATION
"Anesthesia Pre-Procedure Evaluation    Patient: Hakeem Meade   MRN:     [id number] Gender:   female   Age:    29 year old :      1990        Preoperative Diagnosis: * No pre-op diagnosis entered *   * No procedures listed *     LABS:  CBC:   Lab Results   Component Value Date    WBC 9.6 2020    WBC 7.1 10/09/2019    HGB 13.3 2020    HGB 12.4 2020    HCT 37.5 2020    HCT 40.3 10/09/2019     2020     2020     BMP:   Lab Results   Component Value Date     2019    POTASSIUM 3.8 2019    CHLORIDE 104 2019    CO2 22 2019    BUN 15 2019    CR 0.46 (L) 2019    GLC 82 2019    GLC 91 2019     COAGS: No results found for: PTT, INR, FIBR  POC: No results found for: BGM, HCG, HCGS  OTHER:   Lab Results   Component Value Date    A1C 5.3 10/09/2019    YAMILET 9.0 2019    TSH 2.94 2020        Preop Vitals    BP Readings from Last 3 Encounters:   20 109/64   20 115/76   20 112/78    Pulse Readings from Last 3 Encounters:   20 76   04/15/20 112   20 78      Resp Readings from Last 3 Encounters:   20 16   19 16    SpO2 Readings from Last 3 Encounters:   20 99%   19 98%      Temp Readings from Last 1 Encounters:   20 37.1  C (98.8  F) (Oral)    Ht Readings from Last 1 Encounters:   20 1.626 m (5' 4\")      Wt Readings from Last 1 Encounters:   20 73 kg (160 lb 14.4 oz)    Estimated body mass index is 27.62 kg/m  as calculated from the following:    Height as of 20: 1.626 m (5' 4\").    Weight as of 20: 73 kg (160 lb 14.4 oz).     LDA:  Peripheral IV 19 Right Hand (Active)   Number of days: 491       Peripheral IV 20 Right;Dorsal;Posterior Wrist (Active)   Site Assessment WDL 20   Line Status Infusing 20   Phlebitis Scale 0-->no symptoms 20   Infiltration Scale 0 20   Number of days: 0     "   Intrathecal/Epidural Catheter 05/09/20 (Active)   Site Assessment Clean, dry, intact 05/09/20 2030   Line Status Infusing 05/09/20 2030   Dressing Type Transparent 05/09/20 2030   Dressing Status Dressing  dry and intact 05/09/20 2030   Number of days: 0        Past Medical History:   Diagnosis Date     NO ACTIVE PROBLEMS       Past Surgical History:   Procedure Laterality Date     C EACH ADD TOOTH EXTRACTION       DILATION AND EVACUATION N/A 1/4/2019    Procedure: DILATION AND EVACUATION;  Surgeon: Meghana Hoyos MD;  Location: [facility] OR     FOOT SURGERY       NO HISTORY OF SURGERY        Allergies   Allergen Reactions     Cefradine [Cephradine]      Redness at injection site        Anesthesia Evaluation       history and physical reviewed .             ROS/MED HX    ENT/Pulmonary:  - neg pulmonary ROS     Neurologic:  - neg neurologic ROS     Cardiovascular:  - neg cardiovascular ROS       METS/Exercise Tolerance:     Hematologic:  - neg hematologic  ROS       Musculoskeletal:  - neg musculoskeletal ROS       GI/Hepatic:  - neg GI/hepatic ROS       Renal/Genitourinary:  - ROS Renal section negative       Endo:  - neg endo ROS       Psychiatric:  - neg psychiatric ROS       Infectious Disease:  - neg infectious disease ROS       Malignancy:      - no malignancy   Other:    - neg other ROS                 JZG FV AN PHYSICAL EXAM    JZG FV AN PLAN NO PONV RULE          Varinder Guerrero MD

## 2020-05-10 NOTE — PROGRESS NOTES
OB Staff Progress Note    Patient comfortable after epidural, continue to have bloody show.    O:  Patient Vitals for the past 24 hrs:   BP Temp Temp src Resp SpO2   20 106/64 -- -- -- --   20 107/57 -- -- -- 98 %   20 109/60 -- -- -- --   20 -- -- -- -- 99 %   20 102/62 -- -- -- --   20 97/54 -- -- -- 99 %   20 92/55 -- -- -- --   20 93/54 -- -- -- 100 %   20 1950 91/52 -- -- -- --   20 92/55 -- -- -- --   20 100/60 -- -- -- 99 %   20 1740 118/75 98  F (36.7  C) Oral 20 --     Gen'l: appears comfortable  SVE: 7/95/-1, AROM clear fluid    FHT: 150 baseline, moderate variability, accels present, no decelerations  [place]: 4-5 in 10 minutes    A/P: 30 yo  at 39w3d admitted in active labor    - expectant management, consider IVF bolus if tachysystole  - category 1 fetal tracing  - GBS negative    Mirella Holly MD

## 2020-05-10 NOTE — PLAN OF CARE
of viable Female with Dr. Elayne MD in attendance. NICU/ Nursery RN Jim, RN present. Infant with spontaneous cry to mothers abdomen, dried and stimulated. Apgars 9/9. Placenta delivered without complications, pitocin bolused. Karely cares provided. Mother and baby in stable condition.

## 2020-05-10 NOTE — DISCHARGE SUMMARY
[facility] Discharge Summary    Hakeem Meade MRN# [id number]   Age: 29 year old YOB: 1990     Date of Admission:  2020  Date of Discharge:  2020  Admitting Physician:  Mirella Holly MD  Discharge Physician:  Raisa Adam MD    Admit Dx:   - Intrauterine pregnancy at 39w3d   - Labor    Discharge Dx:  - Same as above, s/p     Procedures:  - Spontaneous vaginal delivery  - Epidural anesthesia    Admit HPI/Labor Course:  Hakeem Meade is a 29 year old  admitted in spontaneous labor.  Patient progressed to complete after onset of spontaneous labor.  She received an epidural for labor analgesia and initially FHT were category 1.  Once complete fetal status was category 2 with fetal tachycardia with variable decels with contractions.  She pushed effectively to deliver female infant in direct OA presentation over intact perineum. Both shoulders delivered easily and infant was vigorous on delivery.  She was placed on mother's abdomen and delayed cord clamping was done.  Cord was cut and segment set aside.  First APGAR was 9 and cord gases were not sent.  Pitocin was run through the IV after infant delivery and placenta delivered spontaeously with 3 vessel cord.  Placenta was intact.  Immediately following delivery of the placenta a large amount of dark clot passed.  For this reason the placenta was sent to pathology.  Inspection revealed 2nd degree laceration which was repaired with 3-0 vicryl in standard fashion.  No other lacerations noted.  , weight 7lb 3oz.  Infant and mom stable in room.  Please see her Admission H&P and Delivery Summary for further details.    Postpartum Course:  Her postpartum course was uncomplicated. On PPD#2, she was meeting all of her postpartum goals and deemed stable for discharge. She was voiding without difficulty, tolerating a regular diet without nausea and vomiting, her pain was well controlled on oral pain medicines and her  lochia was appropriate. Her hemoglobin prior to delivery was 11  Hemoglobin   Date Value Ref Range Status   05/11/2020 11.4 (L) 11.7 - 15.7 g/dL Final   05/10/2020 11.0 (L) 11.7 - 15.7 g/dL Final      and after delivery was 11.4. Her Rh status was positive, and Rhogam was not indicated.       Discharge Medications:     Review of your medicines      CONTINUE these medicines which have NOT CHANGED      Dose / Directions   acetaminophen 325 MG tablet  Commonly known as:  TYLENOL  Used for:  Encounter for supervision of normal first pregnancy in third trimester      Dose:  650 mg  Take 2 tablets (650 mg) by mouth every 6 hours as needed for mild pain  Quantity:  90 tablet  Refills:  1     cholecalciferol 125 MCG (5000 UT) Tabs tablet  Commonly known as:  vitamin D3      Dose:  5,000 Units  Take 5,000 Units by mouth daily  Refills:  0     docusate sodium 100 MG tablet  Commonly known as:  COLACE  Used for:  Encounter for supervision of normal first pregnancy in third trimester      Dose:  100 mg  Take 1 tablet (100 mg) by mouth 2 times daily as needed for constipation  Quantity:  60 tablet  Refills:  1     fluticasone 50 MCG/ACT nasal spray  Commonly known as:  FLONASE  Used for:  Nasal bleeding      Dose:  1 spray  Spray 1 spray into both nostrils daily  Quantity:  16 g  Refills:  4     ibuprofen 600 MG tablet  Commonly known as:  ADVIL/MOTRIN  Used for:  Encounter for supervision of normal first pregnancy in third trimester      Dose:  600 mg  Take 1 tablet (600 mg) by mouth every 6 hours as needed for moderate pain  Quantity:  60 tablet  Refills:  1     prenatal multivitamin w/iron 27-0.8 MG tablet  Used for:  Supervision of normal first pregnancy, antepartum      Dose:  1 tablet  Take 1 tablet by mouth daily  Refills:  0     saline nasal Gel topical gel  Used for:  Nasal bleeding      Apply into each nare 4 times daily as needed for congestion  Quantity:  3 Tube  Refills:  4             Discharge/Disposition:  Yuan  Deshaun was discharged to home in stable condition with the following instructions/medications:  1) Call for temperature > 100.4, bright red vaginal bleeding >1 pad an hour x 2 hours, foul smelling vaginal discharge, pain not controlled by usual oral pain meds, persistent nausea and vomiting not controlled on medications  2) She desired condoms for contraception.  3) For feeding she decided to breast feed.  4) She was instructed to follow-up with her primary OB in 6 weeks for a routine postpartum visit and 2 weeks for postpartum check in telephone visit.    Ayesha Miller MD  Obstetrics and Gynecology, PGY-3  May 11, 2020 , 8:01 AM      The patient was seen and examined by me on the day of discharge.  I have reviewed and agree with the above note.    Raisa Adam MD, FACOG

## 2020-05-10 NOTE — PLAN OF CARE
Patient arrived to [facility] unit via wheelchair at 0300 ,with belongings, accompanied by spouse/ significant other, with infant in arms. Received report from  Sabine JOE RN  and checked bands. Unit and room orientation started. Call light within arms reach; no concerns present at this time. Continue with plan of care.

## 2020-05-10 NOTE — PROGRESS NOTES
Labor Progress Note  S: patient comfortable    O:   Patient Vitals for the past 12 hrs:   BP Temp Temp src Resp SpO2   20 -- 98.8  F (37.1  C) Oral 16 --   20 109/64 98.2  F (36.8  C) Oral 18 99 %   20 106/64 -- -- -- --   20 107/57 -- -- 18 98 %   20 109/60 -- -- 18 --   20 -- -- -- -- 99 %   20 102/62 -- -- 18 --   20 97/54 -- -- 18 99 %   20 92/55 -- -- 18 --   20 93/54 -- -- 18 100 %   20 1950 91/52 -- -- 18 --   20 92/55 -- -- 22 --   20 100/60 -- -- 22 99 %   20 1740 118/75 98  F (36.7  C) Oral 20 --     SVE: 10/100/+1    FHT: Baseline 165, minimal to moderate variability, + accelerations, no decelerations  [place]: contractions every 2-3 minutes    A/P:  Ms. Hakeem Meade is a 29 year old  at 39w3d, admitted for labor, reassuring maternal and fetal status.    1. Labor: expectant management, anticipate   2. Rh positive  3. GBS negative  4. Fetus: Category II FHT, fetal tachycardia but no maternal fever or maternal tachycardia, continue EFM and tocometry.  EFW 7lbs.  5. Prenatal hx:  1. Rubella immune  2. Anatomy scan: wnl apart from EIF, low risk NIPT  3. GCT passed at 110  4. Placenta posterior    Dr. Holly present on floor and has reviewed tracing, labor progress    Juany Dodson MD  OBGYN PGY-4  [phone] (OB PGY3 Pager)  2020  10:08 PM

## 2020-05-10 NOTE — PROGRESS NOTES
[facility] Obstetrics Postpartum Progress Note    , PPD#1,  @ 39w3d    S: Patient states she is doing well.  No cramping, some pain near stitches. Lochia moderare.  Eating and drinking without nausea/vomiting.  Ambulating without difficulty.  Voiding without difficulty.  Denies fevers/chills, dizziness, CP, SOB.  Breastfeeding.    O:  Patient Vitals for the past 24 hrs:   BP Temp Temp src Heart Rate Resp SpO2   05/10/20 0621 (!)  98.3  F (36.8  C) Oral 86 16 --   05/10/20 0440 -- 99.6  F (37.6  C) Oral -- -- --   05/10/20 0309 104 99.4  F (37.4  C) Oral 72 18 98 %   05/10/20 0118 106/52 -- -- -- -- 99 %   05/10/20 0103 111/59 -- -- -- -- 99 %   05/10/20 0049 97/53 -- -- -- -- 100 %   05/10/20 0033 123/51 -- -- -- -- 99 %   05/10/20 0018 111/59 -- -- -- -- 100 %   05/10/20 0003 (!) 88/54 -- -- -- -- 99 %   20 2348 93/54 -- -- -- 18 --   20 2334 (!) 88/50 -- -- -- -- 100 %   20 2319 100/51 -- -- -- -- 100 %   20 -- 99.4  F (37.4  C) Oral -- 18 --   20 -- 99.5  F (37.5  C) -- -- -- --   20 104/63 -- -- -- 18 --   20 -- 98.8  F (37.1  C) Oral -- 16 --   20 109 98.2  F (36.8  C) Oral -- 18 99 %   20/64 -- -- -- -- --   20 107/57 -- -- -- 18 98 %   20 109/60 -- -- -- 18 --   20 -- -- -- -- -- 99 %   20 102/62 -- -- -- 18 --   20 97/54 -- -- -- 18 99 %   20 92/55 -- -- -- 18 --   20 93/54 -- -- -- 18 100 %   20 1950 91/52 -- -- -- 18 --   20 92/55 -- -- -- 22 --   20 100/60 -- -- -- 22 99 %   200 118/75 98  F (36.7  C) Oral -- 20 --     Gen:  NAD  CV: regular rate and rhythm  Resp: CTAB, good air movement  Abd: soft, nondistended, nontender, fundus firm at 3cm below umbilicus  Ext: non-tender, trace pedal edema, no erythema    Hemoglobin   Date Value Ref Range Status   2020 13.3 11.7 - 15.7  g/dL Final   2020 12.4 11.7 - 15.7 g/dL Final       A/P: 29 year old  PPD#1 s/p , doing well postpartum    1. Heme: the patient had an admission Hgb of 13.3, a QBL of 690cc was associated with delivery.  PPD1 Hgb is pending.  Patient is not tachycardic or symptomatic. Transient mild hypotension.  2. Pain control: pain is well-controlled with Tylenol & Motrin, continue.  3. Rh positive  4. Rubella immune  5. Routine postpartum:    encourage breastfeeding    Encourage ambulation    Continue regular diet    Continue bowel regimen    Contraception: condoms  6. Dispo: likely discharge tomorrow    Juany Dodson MD  PGY-4 OB/GYN  OB G2 Pager [phone]  05/10/2020 8:54 AM

## 2020-05-10 NOTE — PLAN OF CARE
Pt arrived from home this afternoon with her . Pt was uncomfortable with jamie. Oriented to room and call light. MD notified of arrival. Plan for epidural. Now comfortable with epidural. SVE 7/95/-1. AROM clear fluid. Jamie every 2-3 minutes. FHM moderate variability, +accels. Will continue to monitor.

## 2020-05-11 VITALS
DIASTOLIC BLOOD PRESSURE: 71 MMHG | TEMPERATURE: 97.7 F | RESPIRATION RATE: 16 BRPM | HEART RATE: 67 BPM | SYSTOLIC BLOOD PRESSURE: 104 MMHG | OXYGEN SATURATION: 98 %

## 2020-05-11 LAB — HGB BLD-MCNC: 11.4 G/DL (ref 11.7–15.7)

## 2020-05-11 PROCEDURE — 25000132 ZZH RX MED GY IP 250 OP 250 PS 637: Performed by: STUDENT IN AN ORGANIZED HEALTH CARE EDUCATION/TRAINING PROGRAM

## 2020-05-11 PROCEDURE — 36415 COLL VENOUS BLD VENIPUNCTURE: CPT | Performed by: STUDENT IN AN ORGANIZED HEALTH CARE EDUCATION/TRAINING PROGRAM

## 2020-05-11 PROCEDURE — 85018 HEMOGLOBIN: CPT | Performed by: STUDENT IN AN ORGANIZED HEALTH CARE EDUCATION/TRAINING PROGRAM

## 2020-05-11 RX ADMIN — IBUPROFEN 800 MG: 800 TABLET, FILM COATED ORAL at 09:45

## 2020-05-11 RX ADMIN — SENNOSIDES AND DOCUSATE SODIUM 2 TABLET: 8.6; 5 TABLET ORAL at 09:45

## 2020-05-11 RX ADMIN — IBUPROFEN 800 MG: 800 TABLET, FILM COATED ORAL at 00:34

## 2020-05-11 NOTE — PLAN OF CARE
VSS. LS CTA. Pt denies headache, dizziness or changes to vision. Breasts are soft, nipples intact. BS+, flatus+, BM+. Patient is taking stool softeners. Pt is voiding independently and with out difficulty. FF@ U/1, midline. Perineum is healing and sore. Pt is applying tucks pads and using her bola bottle. Small amount of rubra lochia. Pt denies pain in legs, no clonus or edema. Pt c/o perineal discomfort and uterine cramping. Patient is taking Tylenol and ibuprofen PRN to manage her pain. Patient is loving towards her , is attentive to  needs and is active in  cares. FOB is supportive and at bedside.

## 2020-05-11 NOTE — PLAN OF CARE
Data: Vital signs stable, postpartum assessments within normal limits.   Eating and drinking without nausea or vomiting.  Up ad lori, and voiding without difficulty. Passing gas.  Feeding baby independently-  breast feeding.  Pain managed with ibuprofen. Pt states she is comfortable.  Perineum appears to be healing well, no s/s infection.  Discharge outcomes on care plan met.   Action: Review of care plan, teaching, and discharge instructions done. Infant identification with ID bands done, verification with signature obtained.   Response: Patient states understanding and comfort with her discharge instructions and plan of care. All questions addressed. She will discharge home with her infant.

## 2020-05-11 NOTE — LACTATION NOTE
This note was copied from a baby's chart.  Parents called lactation phone with request for consult prior to discharge, breastfeeding has been going well. Vaginal delivery @ term,  Baby AGA @ 7# 3.7 ounces and 5.5% weight loss at 24 hours with excellent diaper output and low risk serum bilirubin. No significant medical history for mom.     Baby latched on arrival, appears shallow and mom says she has no trouble latching her but has to always push on the breast to get her to suck, otherwise she falls asleep.    With permission, demo tips for getting deeper latch with hands on assist. Mom could see and feel difference, much more comfortable. Had her do deeper latch on second side with minimal assist, infant fed for about 20 minutes during visit and continued with spontaneous, nutritive sucking without any breast compressions the whole time. Reviewed anatomy of breast and infant mouth for feedings, how to tell if good latch and transferring milk, feeding cues, burping, what to expect in coming days and preventing engorgement, supply and demand and benefit of hand expressing after feedings until milk is in, how to tell if getting enough, breastfeeding log and who, when to call if concerns not getting enough, breastfeeding resource list adding in [email or web address]. Gave support and encouragement, answered questions.

## 2020-05-11 NOTE — PLAN OF CARE
Data: VSS, postpartum assessments WNL. She is voiding without difficulty, up ad lori, passing gas, eating and drinking normally. Perineum appears to be healing well, lochia WNL, no s/s infection. She is independent with self and infant cares. Breastfeeding infant with assistance. Taking ibuprofen for pain and using the following non-pharmacologic methods: ice packs, bola bottle. Reports good pain management.   Action: Education provided on breastfeeding positions, good latch, self-care. Plan of care  Response: Pt is agreeable with her plan of care. Positive attachment behaviors observed with infant. Support person, , present. Anticipate discharge per care plan.

## 2020-05-15 ENCOUNTER — TELEPHONE (OUTPATIENT)
Dept: OBGYN | Facility: CLINIC | Age: 30
End: 2020-05-15

## 2020-05-15 LAB — COPATH REPORT: NORMAL

## 2020-05-15 NOTE — TELEPHONE ENCOUNTER
Telephone call from Hakeem who states she has sore nipples and are cracking    Revieewed importantance of changing baby position while nursing, latching on, air drying nipples after nursing.  Keeping nipples dry and need to change breast pads     Could try breast shell    Denies any white bumps in baby's mouth or on nipple    Denies any warmth, lumps, pain in breasts or fever however instructed to observe for these and report immediately as could indicate breast infection    Given number to lactation to call and also instructed to talk with peds to see if they have a lactation specialist    Voices agreement to above

## 2020-05-16 ENCOUNTER — TELEPHONE (OUTPATIENT)
Dept: OBGYN | Facility: CLINIC | Age: 30
End: 2020-05-16

## 2020-05-16 NOTE — TELEPHONE ENCOUNTER
Patient is a 28 yo  PPD#7 s/p  who call in this evening with concerns of passing a half dollar size clot that was dark in color.  Patient had not had anything like this before since delivery.  She was nervous because after she delivered her placenta, it was noted that there was some dark clot behind the placenta and concern for abruption.  She was worried this was a continued problem.  She states otherwise she had maybe a bit heavier bleeding today but otherwise nothing too heavy, was changing her pad on occasion when she went to the bathroom.  She denies any cramping.  She has perineal soreness but no concerning discharge.  No fevers/chills.  Baby doing well and her milk is coming in so happy about that.  We discussed how blood can form clots when sitting or sleeping and can then pass after standing or going to bathroom.  She does endorse passing this clot after sleeping for a couple hours.  She has not had continued clots.  We discussed that if she soaks more than 1 pad per hour or has clots bigger than the size of a golf ball, she should let us know.  She appreciated the call today and will continue to monitor things moving forward.  Halina Ellison MD

## 2020-05-18 ENCOUNTER — VIRTUAL VISIT (OUTPATIENT)
Dept: FAMILY MEDICINE | Facility: CLINIC | Age: 30
End: 2020-05-18
Payer: COMMERCIAL

## 2020-05-18 DIAGNOSIS — O92.29 SORE NIPPLES DUE TO LACTATION: Primary | ICD-10-CM

## 2020-05-18 PROCEDURE — 99203 OFFICE O/P NEW LOW 30 MIN: CPT | Mod: GT | Performed by: NURSE PRACTITIONER

## 2020-05-18 NOTE — PROGRESS NOTES
"Hakeem Meade is a 29 year old female who is being evaluated via a billable video visit.      The patient has been notified of following:     \"This video visit will be conducted via a call between you and your physician/provider. We have found that certain health care needs can be provided without the need for an in-person physical exam.  This service lets us provide the care you need with a video conversation.  If a prescription is necessary we can send it directly to your pharmacy.  If lab work is needed we can place an order for that and you can then stop by our lab to have the test done at a later time.    Video visits are billed at different rates depending on your insurance coverage.  Please reach out to your insurance provider with any questions.    If during the course of the call the physician/provider feels a video visit is not appropriate, you will not be charged for this service.\"    Patient has given verbal consent for Video visit? Yes    How would you like to obtain your AVS? [facility]    Patient would like the video invitation sent by: Text to cell phone: [phone]    Will anyone else be joining your video visit?Yes,  will be joining.      Subjective     Hakeem Meade is a 29 year old female who presents today via video visit for the following health issues:      The parent/guardian has been notified of following:     \"This video visit will be conducted via a call between you, your child, and your child's physician/provider. We have found that certain health care needs can be provided without the need for an in-person physical exam.  This service lets us provide the care you need with a video conversation.  If a prescription is necessary we can send it directly to your pharmacy.  If lab work is needed we can place an order for that and you can then stop by our lab to have the test done at a later time.    Video visits are billed at different rates depending on your insurance coverage.  Please reach " "out to your insurance provider with any questions.    If during the course of the call the physician/provider feels a video visit is not appropriate, you will not be charged for this service.\"    Parent/guardian has given verbal consent for Video visit? Yes    How would you like to obtain your AVS? Natalie    Parent/guardian would like the video invitation sent by: Text to cell phone: same    Will anyone else be joining your video visit?       Video-Visit Details    Type of service:  Video Visit    Video End Time:5:55 PM  Video Start Time: 5:00 PM  Originating Location (pt. Location): Home    Distant Location (provider location):  [facility]     Platform used for Video Visit: [employer]   Mother and infant on same video call for back-to-back appts    Return in about 4 days (around 5/22/2020) for weight check and lactation.       MAMADOU Horvath CNP    Initial Lactation Consultation    Baby:  Colette De Souza         MRN:  [id number]  Mom:  Hakeem Meade     MRN: [id number]        Consultation Date: 5/18/2020    HPI  Breastfeeding long-term goals: as long as she can be with infant  Breastfeeding story:  Breastfeeding went well in the hospital.  Had leaking of the breasts in pregnancy and colostrum right away.  Had pain when she initially latches, but after 30 seconds it goes away.  Developed a crack with both nipples while in the hospital - has largely healed.  Has been washing with saline water.  Thinks infant is deeply latched.  Sees her jaw move very significantly.    Right hand side lateral edge of breast feels pins and needles after nursing    RTW 4 months, but may stay at home if coronavirus continues.  Works at [employer]    Nursing every 3-4 hours plus cluster.  Overnight sleeps up to 3 hours.  Nursing on both side(s).  Falls asleep at the breast after 20 minutes.  Nursing sessions last 20-40 minutes per side.    Nipple pain: yes    PUMPING: Not yet started - [place] [facility]  # times per day: " " 0    SUPPLEMENTATION: none    Baby's OUTPUT:   STOOLS:  >4 per day with yellow, seedy appearance  URINE OUTPUT:  Diapers are described as wet     MOTHER      Breastfeeding History  NoN/A    Medical History  Patient Active Problem List   Diagnosis     history of cystic hygroma     Normal intrauterine pregnancy,      History of termination of pregnancy -  Increased risk of Monosomy X (Christine Syndrome) based on cell-free DNA screen      Family history of heart block     Pregnancy      (spontaneous vaginal delivery)       Current Outpatient Medications         Pregnancy History (any complications in this pregnancy)  Uncomplicated    Delivery History  Vaginal    IP lactation note:  \"Parents called lactation phone with request for consult prior to discharge, breastfeeding has been going well. Vaginal delivery @ term,  Baby AGA @ 7# 3.7 ounces and 5.5% weight loss at 24 hours with excellent diaper output and low risk serum bilirubin. No significant medical history for mom.      Baby latched on arrival, appears shallow and mom says she has no trouble latching her but has to always push on the breast to get her to suck, otherwise she falls asleep.     With permission, demo tips for getting deeper latch with hands on assist. Mom could see and feel difference, much more comfortable. Had her do deeper latch on second side with minimal assist, infant fed for about 20 minutes during visit and continued with spontaneous, nutritive sucking without any breast compressions the whole time. Reviewed anatomy of breast and infant mouth for feedings, how to tell if good latch and transferring milk, feeding cues, burping, what to expect in coming days and preventing engorgement, supply and demand and benefit of hand expressing after feedings until milk is in, how to tell if getting enough, breastfeeding log and who, when to call if concerns not getting enough, breastfeeding resource list adding in [employer]. Gave " "support and encouragement, answered questions.\"  Rita Ludwig RN    Labor Meds/Anesthesia  Epidural    Current Medications  Prenatal vitamin  Medication     APNO CREA ointment     acetaminophen (TYLENOL) 325 MG tablet     cholecalciferol (VITAMIN D3) 5000 units TABS tablet     docusate sodium (COLACE) 100 MG tablet     fluticasone (FLONASE) 50 MCG/ACT nasal spray     ibuprofen (ADVIL/MOTRIN) 600 MG tablet     Prenatal Vit-Fe Fumarate-FA (PRENATAL MULTIVITAMIN PLUS IRON) 27-0.8 MG TABS per tablet     saline nasal (AYR SALINE) GEL topical gel     No current facility-administered medications for this visit.      Facility-Administered Medications Ordered in Other Visits   Medication     lidocaine-EPINEPHrine 1.5 %-[id number] injection         Herbals:  None    ASSESSMENT OF MOTHER    Physical:   Breast appearance  Breast Size: average  Nipple Appearance - Left: abraded  Nipple Appearance - Right: not seen  Nipple Erectility - Left: erect with stimulation  Nipple Erectility - Right: not seen  Areolas Compressibility: n/a  Nipple Size: average  Milk Supply: mature      BABY       Name: Colette De Souza YOB: 2022 Age: 9 day old   Gestational at birth: 39w3d    Doctor: No Ref-Primary, Physician    BABY'S WEIGHT HISTORY  Last interval weight:  2 oz. Loss in 2 days after discharge  Birth Weight: 7 lbs 3.7 oz    Wt Readings from Last 5 Encounters:   05/13/20 3.048 kg (6 lb 11.5 oz) (25 %)*   05/11/20 3.1 kg (6 lb 13.4 oz) (33 %)*     * Growth percentiles are based on WHO (Girls, 0-2 years) data.     Percentage wt. change from birth:       ASSESSMENT OF BABY    Physical:   There were no vitals taken for this visit.    GENERAL: Alert, vigorous, is in no acute distress.  SKIN: skin is clear, no rash or abnormal pigmentation  HEAD: The head is normocephalic. The fontanels and sutures are normal  LUNGS: The lung fields are clear to auscultation,no rales, rhonchi, wheezing or retractions  HEART: The precordium is quiet. " "Rhythm is regular. S1 and S2 are normal. No murmurs.   ABDOMEN: The umbilicus is normal. The bowel sounds are normal. Abdomen soft, non tender,  non distended, no masses or hepatosplenomegaly.  NEUROLOGIC: Normal tone throughout.     Oral Anatomy  Unable to check    FEEDING ASSESSMENT    Initial position and latch strategy observed: left cross cradle  Effort to Latch: awake and alert, latched readily  Duration of Breast Feeding: Right Breast: 0; Left Breast: 10  Nipple pain:  At start  Weight gain at breast:  No able to measure    A latch was observed today.    Latch:  1 - Repeated Attempts  Audible Swallowing:  unable to hear  Type of Nipple:  2 - Everted  Comfort+: 1 - Filling, small blisters, mild/mod pain  Hold:  1 - Min. Assist  Suckin - Long, slow, continuous  TOTAL LATCHES SCORE:  7     INTERVENTIONS/EVALUATION:  Cross Cradle, Asymmetric Latch and Flange lips      SUMMARY  1.  Infant weight gain - weight loss is less than 10%  2.  Maternal supply - appear sufficient by poopy diapers  3.  Maternal health - sore and cracked nipples  4.  Latch - not asymmetric, worked on latch - will review at appt.  5.  Milk transfer - unable to measure    RECOMMENDATIONS  Patient Instructions     For the right side breast pain try Lecithin  Supplement for mother- 2 capsules (1200mg each ) - 3 times /day or      Lecithin liquid:   1 Tablespoon 3 times/day and reduce saturated fats in diet - this is often VERY helpful.     If you have increased pain, redness and/or fever/chills, please contact the clinic immediately.        Positioning and latch (google \"asymmetric latch\")  Goal is to have a deep latch with areola in the baby's mouth instead of just nipple and to have baby pulling tongue along breast to get milk instead of \"chomping\" or sucking shallowly    Here is one way to achieve that:  1. Sit back with feet up and shoulders relaxed - you'll be bringing baby to you instead of your breast to baby  2. Bring baby snug up " "to you (skin to skin is best!) with baby's tummy to your tummy and with baby's ear, shoulder and hip aligned  3.  The baby's nose (not mouth) should be aligned with your nipple  4.  Hold breast behind areola in a \"U shape\" to help mold the breast tissue and make it easy for baby to latch  5.  Hand on neck/bottom of head and baby's chin on the breast  6.  Wait for a big open mouth and \"pop\" baby onto breast    Expect to nurse every 1-3 hours and more often during cluster feeding or a total of 10-12 times per day  If it has been three hours, wake her up to nurse  If she has stopped doing \"long suck and swallow\" - do breast compression and if she doesn't restart long suck and swallow then switch sides (or stop if it is the second side)  Probably doesn't need more than 10 minutes per side, but go by her suck and not the clock    Skin-to-skin is best  Try to burp between sides and at the end - some babies don't burp - https://www.Dexterraube.com/watch?v=a22DMRimktp        Follow up: 4 days for weight check    55 minutes time spent face-to-face, 28 with mother and 27 with baby, with over 50% spent in counseling/coordination of care regarding breastfeeding goals, latch, nipple care, weight gain expectations, and pumping.     SUSAN Boyle    "

## 2020-05-19 NOTE — PATIENT INSTRUCTIONS
"  For the right side breast pain try Lecithin  Supplement for mother- 2 capsules (1200mg each ) - 3 times /day or      Lecithin liquid:   1 Tablespoon 3 times/day and reduce saturated fats in diet - this is often VERY helpful.     If you have increased pain, redness and/or fever/chills, please contact the clinic immediately.        Positioning and latch (google \"asymmetric latch\")  Goal is to have a deep latch with areola in the baby's mouth instead of just nipple and to have baby pulling tongue along breast to get milk instead of \"chomping\" or sucking shallowly    Here is one way to achieve that:  1. Sit back with feet up and shoulders relaxed - you'll be bringing baby to you instead of your breast to baby  2. Bring baby snug up to you (skin to skin is best!) with baby's tummy to your tummy and with baby's ear, shoulder and hip aligned  3.  The baby's nose (not mouth) should be aligned with your nipple  4.  Hold breast behind areola in a \"U shape\" to help mold the breast tissue and make it easy for baby to latch  5.  Hand on neck/bottom of head and baby's chin on the breast  6.  Wait for a big open mouth and \"pop\" baby onto breast    Expect to nurse every 1-3 hours and more often during cluster feeding or a total of 10-12 times per day  If it has been three hours, wake her up to nurse  If she has stopped doing \"long suck and swallow\" - do breast compression and if she doesn't restart long suck and swallow then switch sides (or stop if it is the second side)  Probably doesn't need more than 10 minutes per side, but go by her suck and not the clock    Skin-to-skin is best  Try to burp between sides and at the end - some babies don't burp - https://www.youDermLinkube.com/watch?v=x34MTHoubwy      "

## 2020-05-22 ENCOUNTER — VIRTUAL VISIT (OUTPATIENT)
Dept: OBGYN | Facility: CLINIC | Age: 30
End: 2020-05-22
Attending: OBSTETRICS & GYNECOLOGY
Payer: COMMERCIAL

## 2020-05-22 ENCOUNTER — TELEPHONE (OUTPATIENT)
Dept: FAMILY MEDICINE | Facility: CLINIC | Age: 30
End: 2020-05-22

## 2020-05-22 DIAGNOSIS — O92.29 SORE NIPPLES DUE TO LACTATION: ICD-10-CM

## 2020-05-22 PROBLEM — Z34.90 NORMAL INTRAUTERINE PREGNANCY, ANTEPARTUM: Status: RESOLVED | Noted: 2019-10-09 | Resolved: 2020-05-22

## 2020-05-22 PROBLEM — Z34.90 PREGNANCY: Status: RESOLVED | Noted: 2020-05-09 | Resolved: 2020-05-22

## 2020-05-22 NOTE — LETTER
"2020     RE: Laura Meade  [address] [place] [address]     Dear Colleague,    Thank you for referring your patient, Laura Meade, to the [facility] at [facility]. Please see a copy of my visit note below.    28 yo  s/p  20 who presents for one week PP check up.     Doing well.  Breast feeding well, some pain post feeding on right side which resolved with heat or massage. Denies F/C/redness or firmness. Advised to monitor for signs of infection and to call accordingly.    Lochia mild and not bothersome.    First peds visit went well and received nipple ointment Rx.    Mood is good without si/sx of PP depression.    Her mother facetimed her and was worried she appears \"pale\" and her mouth mucosa is \"pale\". Reviewed PP hgb was 11.4 and encouraged dietary sources of iron.    Reviewed final placenta pathology and discussed doubt any implications for subsequent pregnancy. Reassured.     OB History    Para Term  AB Living   3 1 1 0 2 1   SAB TAB Ectopic Multiple Live Births   0 1 0 0 1      # Outcome Date GA Lbr Maxime/2nd Weight Sex Delivery Anes PTL Lv   3 Term 20 39w3d 10:58 / 01:13 3.28 kg (7 lb 3.7 oz) F Vag-Spont EPI N RAYO      Name: VICKYFEMALE-LAURA      Apgar1: 9  Apgar5: 9   2 AB 18 15w0d             Complications: Chromosome anomaly   1 TAB              Past Medical History:   Diagnosis Date     NO ACTIVE PROBLEMS      Review Of Systems  Skin: negative  Eyes: negative  Ears/Nose/Throat: negative  Respiratory: No shortness of breath, dyspnea on exertion, cough, or hemoptysis  Cardiovascular: negative  Gastrointestinal: negative  Genitourinary: as above  Musculoskeletal: negative  Neurologic: negative  Psychiatric: negative  Hematologic/Lymphatic/Immunologic: negative  Endocrine: fatigue (appropriate for post partum)    O: sounds well  Engaged and well spoken about her post partum course.   No unusual " respiratory effort.    A/P:  Normal visit one week post partum  All questions and concerns addressed  Recommend come for 6 week PP in person    Covid 19: no si/sx/exposures; following current recommendations and guidelines.    Telemedicine Visit: The patient's condition can be safely assessed and treated via telemedicine.    Reason for Telemedicine Visit: Patient has requested telehealth visit COVID 19    Originating Site (Patient Location): Patient's home    Distant Site (Provider Location): Provider Remote Setting    Consent:  The patient/guardian has verbally consented to: the potential risks and benefits of telemedicine versus in person care; bill my insurance or make self-payment for services provided; and responsibility for payment of non-covered services.     Mode of Communication:  Doximity    As the provider I attest to compliance with applicable laws and regulations related to telemedicine.    Total visit time was 30 minutes with 30 minutes spent in counseling and coordination of care for post partum one week checkin.  Jacy Sosa MD

## 2020-05-22 NOTE — TELEPHONE ENCOUNTER
Reason for call:  Medication     If this is a refill request, has the caller requested the refill from the pharmacy already? No     Will the patient be using a [facility]? any pharmacy that have the medication     Name of the pharmacy and phone number for the current request: sperical medication. Send to any pharmacy that have this medication.    But if [facility]aton than can send prescription over to them because pt live close by [facility].    If possible pt will like to have it today.    Name of the medication requested: APNO CREA ointment [[id number]] (Order [id number])       Other request: as mention during her appt, pt was told that prescription will be send to a [facility] pharmacy.    Phone number to reach patient:  Home number on file [phone] (home)    Best Time:  Anytime    Can we leave a detailed message on this number?  YES    Travel screening: Not Applicable

## 2020-05-22 NOTE — PROGRESS NOTES
"30 yo  s/p  20 who presents for one week PP check up.     Doing well.  Breast feeding well, some pain post feeding on right side which resolved with heat or massage. Denies F/C/redness or firmness. Advised to monitor for signs of infection and to call accordingly.    Lochia mild and not bothersome.    First peds visit went well and received nipple ointment Rx.    Mood is good without si/sx of PP depression.    Her mother facetimed her and was worried she appears \"pale\" and her mouth mucosa is \"pale\". Reviewed PP hgb was 11.4 and encouraged dietary sources of iron.    Reviewed final placenta pathology and discussed doubt any implications for subsequent pregnancy. Reassured.         OB History    Para Term  AB Living   3 1 1 0 2 1   SAB TAB Ectopic Multiple Live Births   0 1 0 0 1      # Outcome Date GA Lbr Maxime/2nd Weight Sex Delivery Anes PTL Lv   3 Term 20 39w3d 10:58 / 01:13 3.28 kg (7 lb 3.7 oz) F Vag-Spont EPI N RAYO      Name: LAW PERRY      Apgar1: 9  Apgar5: 9   2 AB 18 15w0d             Complications: Chromosome anomaly   1 TAB              Past Medical History:   Diagnosis Date     NO ACTIVE PROBLEMS      Review Of Systems  Skin: negative  Eyes: negative  Ears/Nose/Throat: negative  Respiratory: No shortness of breath, dyspnea on exertion, cough, or hemoptysis  Cardiovascular: negative  Gastrointestinal: negative  Genitourinary: as above  Musculoskeletal: negative  Neurologic: negative  Psychiatric: negative  Hematologic/Lymphatic/Immunologic: negative  Endocrine: fatigue (appropriate for post partum)    O: sounds well  Engaged and well spoken about her post partum course.   No unusual respiratory effort.    A/P:  Normal visit one week post partum  All questions and concerns addressed  Recommend come for 6 week PP in person    Covid 19: no si/sx/exposures; following current recommendations and guidelines.    Telemedicine Visit: The patient's condition can be " safely assessed and treated via telemedicine.    Reason for Telemedicine Visit: Patient has requested telehealth visit COVID 19    Originating Site (Patient Location): Patient's home    Distant Site (Provider Location): Provider Remote Setting    Consent:  The patient/guardian has verbally consented to: the potential risks and benefits of telemedicine versus in person care; bill my insurance or make self-payment for services provided; and responsibility for payment of non-covered services.     Mode of Communication:  Doximity    As the provider I attest to compliance with applicable laws and regulations related to telemedicine.    Total visit time was 30 minutes with 30 minutes spent in counseling and coordination of care for post partum one week checkin.    Jacy Sosa MD

## 2020-05-22 NOTE — TELEPHONE ENCOUNTER
Left message for pt, script for cream has been faxed to the  compounding pharmacy, [facility] not [facility]    Debbie Koch RN   [facility]

## 2020-06-22 ENCOUNTER — OFFICE VISIT (OUTPATIENT)
Dept: OBGYN | Facility: CLINIC | Age: 30
End: 2020-06-22
Attending: OBSTETRICS & GYNECOLOGY
Payer: COMMERCIAL

## 2020-06-22 VITALS
BODY MASS INDEX: 23.76 KG/M2 | SYSTOLIC BLOOD PRESSURE: 108 MMHG | HEART RATE: 89 BPM | HEIGHT: 64 IN | DIASTOLIC BLOOD PRESSURE: 71 MMHG | WEIGHT: 139.2 LBS

## 2020-06-22 DIAGNOSIS — K59.01 SLOW TRANSIT CONSTIPATION: ICD-10-CM

## 2020-06-22 PROCEDURE — G0463 HOSPITAL OUTPT CLINIC VISIT: HCPCS | Mod: ZF

## 2020-06-22 RX ORDER — DOCUSATE SODIUM 100 MG/1
100 CAPSULE, LIQUID FILLED ORAL 2 TIMES DAILY PRN
Qty: 100 CAPSULE | Refills: 3 | Status: SHIPPED | OUTPATIENT
Start: 2020-06-22 | End: 2022-05-31

## 2020-06-22 ASSESSMENT — ANXIETY QUESTIONNAIRES
GAD7 TOTAL SCORE: 0
7. FEELING AFRAID AS IF SOMETHING AWFUL MIGHT HAPPEN: NOT AT ALL
3. WORRYING TOO MUCH ABOUT DIFFERENT THINGS: NOT AT ALL
IF YOU CHECKED OFF ANY PROBLEMS ON THIS QUESTIONNAIRE, HOW DIFFICULT HAVE THESE PROBLEMS MADE IT FOR YOU TO DO YOUR WORK, TAKE CARE OF THINGS AT HOME, OR GET ALONG WITH OTHER PEOPLE: NOT DIFFICULT AT ALL
5. BEING SO RESTLESS THAT IT IS HARD TO SIT STILL: NOT AT ALL
2. NOT BEING ABLE TO STOP OR CONTROL WORRYING: NOT AT ALL
1. FEELING NERVOUS, ANXIOUS, OR ON EDGE: NOT AT ALL
6. BECOMING EASILY ANNOYED OR IRRITABLE: NOT AT ALL

## 2020-06-22 ASSESSMENT — PATIENT HEALTH QUESTIONNAIRE - PHQ9
5. POOR APPETITE OR OVEREATING: NOT AT ALL
SUM OF ALL RESPONSES TO PHQ QUESTIONS 1-9: 0

## 2020-06-22 ASSESSMENT — MIFFLIN-ST. JEOR: SCORE: 1331.41

## 2020-06-22 NOTE — NURSING NOTE
SUBJECTIVE:   Hakeem Meade is here for her 6-week postpartum checkup.     PHQ-9 score: 0  Hx of Abuse:  No    Delivery Date: 2020.    Delivering provider:  Mirella Holly MD.    Type of delivery:  .  Perineum:  feels well healed.     Delivery complications: None  Infant gender:  girl, weight 7 pounds 3.7 oz.  Feeding Method:  .  Complications reported with feeding:  none, infant thriving Bleeding:  None.  Duration:  2-3 weeks.  Menses resumed:  No  Bowel/Urinary problems:  Yes , constipation.    Contraception Planned:  condoms  She  has not had intercourse since delivery..

## 2020-06-22 NOTE — LETTER
"2020       RE: Hakeem Meade  [address] [place] [address]     Dear Colleague,    Thank you for referring your patient, Hakeem Meade, to the [facility] at [facility]. Please see a copy of my visit note below.      Nursing Notes:   Salty Lemons CMA  2020 11:11 AM  Signed  SUBJECTIVE:   Hakeem Meade is here for her 6-week postpartum checkup.     PHQ-9 score: 0  Hx of Abuse:  No    Delivery Date: 2020.    Delivering provider:  Mirella Holly MD.    Type of delivery:  .  Perineum:  feels well healed.     Delivery complications: None  Infant gender:  girl, weight 7 pounds 3.7 oz.  Feeding Method:  .  Complications reported with feeding:  none, infant thriving Bleeding:  None.  Duration:  2-3 weeks.  Menses resumed:  No  Bowel/Urinary problems:  Yes , constipation.    Contraception Planned:  condoms  She  has not had intercourse since delivery..         Pt is doing well today with no concerns other than ongoing constipation.  Breastfeeding is going well-baby was cluster feeding yesterday, seems more content today.  She and her  are doing ok with self-isolation/working from home.  Getting some time outside everyday.  She denies mood concerns.  Planning condoms only for contraception.  Not sure what her return to work plan is yet-possibly will be able to continue remote work after maternity leave.    ================================================================  ROS: 10 point ROS neg other than the symptoms noted above in the HPI.     EXAM:  /71 (BP Location: Left arm, Patient Position: Chair)   Pulse 89   Ht 1.626 m (5' 4\")   Wt 63.1 kg (139 lb 3.2 oz)   LMP 2019 (Exact Date)   Breastfeeding Yes   BMI 23.89 kg/m      General: healthy, alert and no distress  Psych: NEGATIVE  Last PHQ-9 score on record= No Value exists for the : HP#PHQ9  Breasts:  Lactating, Nipples intact with no lesions and " Non-tender  Abdomen: Benign, Soft, flat, non-tender, No masses, organomegaly and Diastasis less than 1-2 FB  Incision:  None   Vulva:  Normal genitalia and Bartholin's, Urethra, [place]'s normal  Vagina:  Atrophic appearing, well healed  Cervix:  Multiparous, and no lesions.    Uterus:  fully involuted and non-tender    Adnexa:  Within normal limits and No masses, nodularity, tenderness  Recto-vaginal:   anus normal    ASSESSMENT:   Encounter Diagnoses   Name Primary?     Slow transit constipation      Postpartum care and examination of lactating mother Yes      Normal postpartum exam after   Pregnancy was complicated by:  none.      PLAN:     Orders Placed This Encounter   Medications     LECITHIN PO     docusate sodium (COLACE) 100 MG capsule     Sig: Take 1 capsule (100 mg) by mouth 2 times daily as needed for constipation     Dispense:  100 capsule     Refill:  3        Risks and benefits of prescribed medications discussed.  Medication instructions reviewed.  Discussed calcium intake, vitamins and supplements including Vitamin D  Exercise encouraged  Follow up in 1 year    Raisa Adam MD, FACOG

## 2020-06-23 ASSESSMENT — ANXIETY QUESTIONNAIRES: GAD7 TOTAL SCORE: 0

## 2020-06-23 NOTE — PROGRESS NOTES
"  Nursing Notes:   Salty Lemons, [facility]  2020 11:11 AM  Signed  SUBJECTIVE:   Hakeem Meade is here for her 6-week postpartum checkup.     PHQ-9 score: 0  Hx of Abuse:  No    Delivery Date: 2020.    Delivering provider:  Mirella Holly MD.    Type of delivery:  .  Perineum:  feels well healed.     Delivery complications: None  Infant gender:  girl, weight 7 pounds 3.7 oz.  Feeding Method:  .  Complications reported with feeding:  none, infant thriving Bleeding:  None.  Duration:  2-3 weeks.  Menses resumed:  No  Bowel/Urinary problems:  Yes , constipation.    Contraception Planned:  condoms  She  has not had intercourse since delivery..         Pt is doing well today with no concerns other than ongoing constipation.  Breastfeeding is going well-baby was cluster feeding yesterday, seems more content today.  She and her  are doing ok with self-isolation/working from home.  Getting some time outside everyday.  She denies mood concerns.  Planning condoms only for contraception.  Not sure what her return to work plan is yet-possibly will be able to continue remote work after maternity leave.    ================================================================  ROS: 10 point ROS neg other than the symptoms noted above in the HPI.     EXAM:  /71 (BP Location: Left arm, Patient Position: Chair)   Pulse 89   Ht 1.626 m (5' 4\")   Wt 63.1 kg (139 lb 3.2 oz)   LMP 2019 (Exact Date)   Breastfeeding Yes   BMI 23.89 kg/m      General: healthy, alert and no distress  Psych: NEGATIVE  Last PHQ-9 score on record= No Value exists for the : HP#PHQ9  Breasts:  Lactating, Nipples intact with no lesions and Non-tender  Abdomen: Benign, Soft, flat, non-tender, No masses, organomegaly and Diastasis less than 1-2 FB  Incision:  None   Vulva:  Normal genitalia and Bartholin's, Urethra, [place]'s normal  Vagina:  Atrophic appearing, well healed  Cervix:  Multiparous, and no lesions.    Uterus:  fully " involuted and non-tender    Adnexa:  Within normal limits and No masses, nodularity, tenderness  Recto-vaginal:   anus normal    ASSESSMENT:   Encounter Diagnoses   Name Primary?     Slow transit constipation      Postpartum care and examination of lactating mother Yes      Normal postpartum exam after   Pregnancy was complicated by:  none.      PLAN:     Orders Placed This Encounter   Medications     LECITHIN PO     docusate sodium (COLACE) 100 MG capsule     Sig: Take 1 capsule (100 mg) by mouth 2 times daily as needed for constipation     Dispense:  100 capsule     Refill:  3        Risks and benefits of prescribed medications discussed.  Medication instructions reviewed.  Discussed calcium intake, vitamins and supplements including Vitamin D  Exercise encouraged  Follow up in 1 year    Raisa Adam MD, FACOG

## 2020-10-23 ENCOUNTER — VIRTUAL VISIT (OUTPATIENT)
Dept: FAMILY MEDICINE | Facility: CLINIC | Age: 30
End: 2020-10-23
Payer: COMMERCIAL

## 2020-10-23 ENCOUNTER — MYC MEDICAL ADVICE (OUTPATIENT)
Dept: FAMILY MEDICINE | Facility: CLINIC | Age: 30
End: 2020-10-23

## 2020-10-23 DIAGNOSIS — R23.8 VESICULAR RASH: Primary | ICD-10-CM

## 2020-10-23 PROCEDURE — 99213 OFFICE O/P EST LOW 20 MIN: CPT | Mod: 95 | Performed by: NURSE PRACTITIONER

## 2020-10-23 NOTE — PATIENT INSTRUCTIONS
I think this may be scabies  Please schedule with dermatology for their consultation and testing if it is scabies  If it is scabies, it is ok to take medication permetherin  Don't use the APNO anymore

## 2020-10-23 NOTE — PROGRESS NOTES
"Hakeem Meade is a 30 year old female who is being evaluated via a billable video visit.      The patient has been notified of following:     \"This video visit will be conducted via a call between you and your physician/provider. We have found that certain health care needs can be provided without the need for an in-person physical exam.  This service lets us provide the care you need with a video conversation.  If a prescription is necessary we can send it directly to your pharmacy.  If lab work is needed we can place an order for that and you can then stop by our lab to have the test done at a later time.    Video visits are billed at different rates depending on your insurance coverage.  Please reach out to your insurance provider with any questions.    If during the course of the call the physician/provider feels a video visit is not appropriate, you will not be charged for this service.\"    Patient has given verbal consent for Video visit? Yes  How would you like to obtain your AVS? MyChart  If you are dropped from the video visit, the video invite should be resent to: Text to cell phone: [phone]  Will anyone else be joining your video visit? No    Subjective     Hakeem Meade is a 30 year old female who presents today via video visit for the following health issues:    HPI     Rash/ skin issue      Duration: 3-4 weeks ago     Description  Location: both hands to lower arms, spread to both legs. One in axilla.Seems like there is water inside, like a pimple. No pain, no small dots. When scratching it would appear more.   Belly button is reddened  Itching: varies from mild to severe     Intensity:  moderate    Accompanying signs and symptoms: beginning was leaking water a little and a little red. Only she has it, no other family has it.     History (similar episodes/previous evaluation): None    Precipitating or alleviating factors:  New exposures:  Not sure, might be from dust mites, or bugs or something.   Recent " "travel: no      Therapies tried and outcome: ointment (APNO) that was prescribed for breast feeding and seems to help a little when applying it on hands.  Also tried an prescription antibiotic ointment which did not help.    A small fluid filled lesion will appear, they are easily \"broken\" and then clear fluid leaked.  The lesions appear in a line.  The areas are itchy.  Now the areas are bleeding.  There are only lesions on the top of hands and feet, not palms or soles.    No one else in the household has the rash    Recent episode of little sleep due to infant.     Video Start Time: 4:54 PM    I have reviewed and updated the patient's Past Medical History, Social History, Family History and Medication List      Review of Systems   Constitutional, HEENT, cardiovascular, pulmonary, gi and gu systems are negative, except as otherwise noted.      Objective           Vitals:  No vitals were obtained today due to virtual visit.    Physical Exam     GENERAL: Healthy, alert and no distress  EYES: Eyes grossly normal to inspection.  No discharge or erythema, or obvious scleral/conjunctival abnormalities.  RESP: No audible wheeze, cough, or visible cyanosis.  No visible retractions or increased work of breathing.    SKIN: difficult to see lesions well over video, but thee is also a picture attached to a [employer] message.  Appears like weeping vesicular rash on hand, there es erythema surrounding and possibly a honey color crust.    NEURO: Cranial nerves grossly intact.  Mentation and speech appropriate for age.  PSYCH: Mentation appears normal, affect normal/bright, judgement and insight intact, normal speech and appearance well-groomed.          Assessment & Plan     (R23.8) Vesicular rash  (primary encounter diagnosis)  Comment:   Plan: SKIN CARE REFERRAL, SKIN CARE REFERRAL, SKIN         CARE REFERRAL                    Patient Instructions   I think this may be scabies  Please schedule with dermatology for their " consultation and testing if it is scabies  If it is scabies, it is ok to take medication permetherin  Don't use the APNO anymore      Return in about 6 months (around 4/13/2021) for Physical Exam and pap.    MAMADOU Horvath CNP  [facility]      Video-Visit Details    Type of service:  Video Visit    Video End Time:5:32 PM    Originating Location (pt. Location): Home    Distant Location (provider location):  [facility]     Platform used for Video Visit: [employer]

## 2020-10-27 NOTE — PROGRESS NOTES
[facility] - PRIMARY CARE SKIN    CC: rash  SUBJECTIVE:   Hakeem Meade is a(n) 30 year old female who presents to clinic today for :  Issue one : rash that started one month ago, started on the top of the hands. Spread to lower arms, belly button, lower legs. Itchy. The rash has become weepy on the hands. No one else at home has a rash. She has never had a rash like this in the past. OTC moisturizers .   Personal Medical History  Skin Cancer: NO  Eczema Psoriasis Lupus   NO NO NO   Other:   .    Family Medical History  Skin Cancer: NO  Eczema Psoriasis Lupus   NO NO NO   Other:   .  Occupation:stay at home     Refer to electronic medical record (EMR) for past medical history and medications.      ROS: 14 point review of systems was negative except the symptoms listed above in the HPI.        OBJECTIVE:   GENERAL: healthy, alert and no distress.  HEENT: PERRL. Conjunctiva, sclera clear.  SKIN: Lindquist Skin Type - IV.  Neck, Trunk, Arms, Legs, Hands, Feet, Fingers, Toes and Buttocks examined. The dermatoscope was used to help evaluate pigmented lesions.  Skin Pertinent Findings:  Dorsum of hands : circular weepy lesions , indurated , 5 mm- 10 mm in size  Anterior legs - multiple excoriated papules  Periumbilibal - maculopapular eruption  Forearms- multiple excoriated papules                           Left areola- weepy erythematous patch , 8 mm in size  Diagnostic Test Results:  No results found for this or any previous visit (from the past 24 hour(s)).    ASSESSMENT:     Encounter Diagnoses   Name Primary?     Eczema, unspecified type Yes     Impetigo      MDM: discussed eczema and then secondary skin infection because of scratching.    PLAN:   Patient Instructions   Cerave in jar  Triamcinolone 0.1% cream apply two times per day to affected areas on hands, arms, legs and umbilicus for 14 days  Cephalexin 500 mg one cap orally three times per day  Recheck in 2 weeks    BLEACH BATH/SOAK INSTRUCTIONS  Bleach  Bath/Soak:  A dilute bleach solution can be helpful in maintaining normal skin yinka.  Frequency: 2 times a week for 2 weeks     Perform a dilute bleach bath adding plain 6% bleach in the following amounts to warm bathwater. Do not use concentrated bleach.    Dilution amounts will be more dilute than typical swimming pools:    Full tub (approximately 40 gallons) - use 1/2 cup of bleach    Half tub - use 1/4 cup of bleach    Mixing bowl (or infants) - use 2 tbsp of bleach    Wait until water and bleach have been mixed before soaking.    Soak for 5-10 minutes.    Pat skin dry with white towels. Take care that bleach may stain towels.    Immediately after soaking, apply any topical medications to the body and to affected areas.    Follow with a bland emollient moisturizer or cream such as Vaseline or Aquaphor.    Never apply bleach directly to your skin.  Avoid oatmeal soaks, because they only provide short-term, temporary relief of itch.        TT: 25 minutes.

## 2020-10-28 ENCOUNTER — OFFICE VISIT (OUTPATIENT)
Dept: FAMILY MEDICINE | Facility: CLINIC | Age: 30
End: 2020-10-28
Attending: NURSE PRACTITIONER
Payer: COMMERCIAL

## 2020-10-28 VITALS — SYSTOLIC BLOOD PRESSURE: 122 MMHG | DIASTOLIC BLOOD PRESSURE: 72 MMHG

## 2020-10-28 DIAGNOSIS — L01.00 IMPETIGO: ICD-10-CM

## 2020-10-28 DIAGNOSIS — L30.9 ECZEMA, UNSPECIFIED TYPE: Primary | ICD-10-CM

## 2020-10-28 PROCEDURE — 99214 OFFICE O/P EST MOD 30 MIN: CPT | Performed by: FAMILY MEDICINE

## 2020-10-28 PROCEDURE — 87070 CULTURE OTHR SPECIMN AEROBIC: CPT | Performed by: FAMILY MEDICINE

## 2020-10-28 RX ORDER — TRIAMCINOLONE ACETONIDE 1 MG/G
CREAM TOPICAL 2 TIMES DAILY
Qty: 454 G | Refills: 0 | Status: SHIPPED | OUTPATIENT
Start: 2020-10-28 | End: 2021-08-30

## 2020-10-28 RX ORDER — CEPHALEXIN 500 MG/1
500 CAPSULE ORAL 3 TIMES DAILY
Qty: 30 CAPSULE | Refills: 0 | Status: SHIPPED | OUTPATIENT
Start: 2020-10-28 | End: 2020-11-07

## 2020-10-28 NOTE — PATIENT INSTRUCTIONS
Cerave in jar  Triamcinolone 0.1% cream apply two times per day to affected areas on hands, arms, legs and umbilicus for 14 days  Cephalexin 500 mg one cap orally three times per day  Recheck in 2 weeks    BLEACH BATH/SOAK INSTRUCTIONS  Bleach Bath/Soak:  A dilute bleach solution can be helpful in maintaining normal skin yinka.  Frequency: 2 times a week for 2 weeks     Perform a dilute bleach bath adding plain 6% bleach in the following amounts to warm bathwater. Do not use concentrated bleach.    Dilution amounts will be more dilute than typical swimming pools:    Full tub (approximately 40 gallons) - use 1/2 cup of bleach    Half tub - use 1/4 cup of bleach    Mixing bowl (or infants) - use 2 tbsp of bleach    Wait until water and bleach have been mixed before soaking.    Soak for 5-10 minutes.    Pat skin dry with white towels. Take care that bleach may stain towels.    Immediately after soaking, apply any topical medications to the body and to affected areas.    Follow with a bland emollient moisturizer or cream such as Vaseline or Aquaphor.    Never apply bleach directly to your skin.  Avoid oatmeal soaks, because they only provide short-term, temporary relief of itch.

## 2020-10-28 NOTE — LETTER
10/28/2020         RE: Hakeem Meade  [address] [place] [address]        Dear Colleague,    Thank you for referring your patient, Hakeem Meade, to the [facility]. Please see a copy of my visit note below.    [facility] - PRIMARY CARE SKIN    CC: rash  SUBJECTIVE:   Hakeem Meade is a(n) 30 year old female who presents to clinic today for :  Issue one : rash that started one month ago, started on the top of the hands. Spread to lower arms, belly button, lower legs. Itchy. The rash has become weepy on the hands. No one else at home has a rash. She has never had a rash like this in the past. OTC moisturizers .   Personal Medical History  Skin Cancer: NO  Eczema Psoriasis Lupus   NO NO NO   Other:   .    Family Medical History  Skin Cancer: NO  Eczema Psoriasis Lupus   NO NO NO   Other:   .  Occupation:stay at home     Refer to electronic medical record (EMR) for past medical history and medications.      ROS: 14 point review of systems was negative except the symptoms listed above in the HPI.        OBJECTIVE:   GENERAL: healthy, alert and no distress.  HEENT: PERRL. Conjunctiva, sclera clear.  SKIN: Lindquist Skin Type - IV.  Neck, Trunk, Arms, Legs, Hands, Feet, Fingers, Toes and Buttocks examined. The dermatoscope was used to help evaluate pigmented lesions.  Skin Pertinent Findings:  Dorsum of hands : circular weepy lesions , indurated , 5 mm- 10 mm in size  Anterior legs - multiple excoriated papules  Periumbilibal - maculopapular eruption  Forearms- multiple excoriated papules                           Left areola- weepy erythematous patch , 8 mm in size  Diagnostic Test Results:  No results found for this or any previous visit (from the past 24 hour(s)).    ASSESSMENT:     Encounter Diagnoses   Name Primary?     Eczema, unspecified type Yes     Impetigo      MDM: discussed eczema and then secondary skin infection because of scratching.    PLAN:   Patient Instructions    Cerave in jar  Triamcinolone 0.1% cream apply two times per day to affected areas on hands, arms, legs and umbilicus for 14 days  Cephalexin 500 mg one cap orally three times per day  Recheck in 2 weeks    BLEACH BATH/SOAK INSTRUCTIONS  Bleach Bath/Soak:  A dilute bleach solution can be helpful in maintaining normal skin yinka.  Frequency: 2 times a week for 2 weeks     Perform a dilute bleach bath adding plain 6% bleach in the following amounts to warm bathwater. Do not use concentrated bleach.    Dilution amounts will be more dilute than typical swimming pools:    Full tub (approximately 40 gallons) - use 1/2 cup of bleach    Half tub - use 1/4 cup of bleach    Mixing bowl (or infants) - use 2 tbsp of bleach    Wait until water and bleach have been mixed before soaking.    Soak for 5-10 minutes.    Pat skin dry with white towels. Take care that bleach may stain towels.    Immediately after soaking, apply any topical medications to the body and to affected areas.    Follow with a bland emollient moisturizer or cream such as Vaseline or Aquaphor.    Never apply bleach directly to your skin.  Avoid oatmeal soaks, because they only provide short-term, temporary relief of itch.        TT: 25 minutes.        Again, thank you for allowing me to participate in the care of your patient.        Sincerely,        Zhane Hanks MD

## 2020-10-30 ENCOUNTER — MYC MEDICAL ADVICE (OUTPATIENT)
Dept: FAMILY MEDICINE | Facility: CLINIC | Age: 30
End: 2020-10-30

## 2020-10-30 LAB
BACTERIA SPEC CULT: NO GROWTH
Lab: NORMAL
SPECIMEN SOURCE: NORMAL

## 2020-10-30 NOTE — TELEPHONE ENCOUNTER
Please see [employer] message and advise.      Thank you,  Qing NICOLERN BSN  [facility]  [phone]

## 2020-11-09 NOTE — PROGRESS NOTES
[facility] - PRIMARY CARE SKIN    CC: rash  SUBJECTIVE:   Hakeem Meade is a(n) 30 year old female who presents to clinic today for follow up of eczema.  Response to treatment : much improved   Current treatment : triamcinolone 0.1% cream , cephalexin 500 mg tid for 7 days , bleach baths .Continue with daily moisturizer    Past history : rash that started one month ago, started on the top of the hands. Spread to lower arms, belly button, lower legs. Itchy. The rash has become weepy on the hands. No one else at home has a rash. She has never had a rash like this in the past. OTC moisturizers .       Personal Medical History  Skin Cancer: NO  Eczema Psoriasis Lupus   NO NO NO   Other:   .    Family Medical History  Skin Cancer: NO  Eczema Psoriasis Lupus   NO NO NO   Other:   .  Occupation:stay at home     Refer to electronic medical record (EMR) for past medical history and medications.      ROS: 14 point review of systems was negative except the symptoms listed above in the HPI.        OBJECTIVE:   GENERAL: healthy, alert and no distress.  HEENT: PERRL. Conjunctiva, sclera clear.  SKIN: Lindquist Skin Type - IV.  Hands, legs, chest were examined .  Skin Pertinent Findings:  Dorsum of hands : circular weepy lesions , indurated , 5 mm- 10 mm in size                          anterior legs- residual postinflammatory erythema                          Right areola - residual erythema, no scaling or crusting  Diagnostic Test Results:  No results found for this or any previous visit (from the past 24 hour(s)).    ASSESSMENT:     Encounter Diagnosis   Name Primary?     Eczema, unspecified type Yes         PLAN:   Patient Instructions     Cerave facial moisturizer or Cetaphil facial moisturizer    Triamcinolone 0.1% cream use to affected areas two times per day when needed    Use minimal soap      TT: 29 minutes.

## 2020-11-10 ENCOUNTER — TELEPHONE (OUTPATIENT)
Dept: FAMILY MEDICINE | Facility: CLINIC | Age: 30
End: 2020-11-10

## 2020-11-17 ENCOUNTER — OFFICE VISIT (OUTPATIENT)
Dept: FAMILY MEDICINE | Facility: CLINIC | Age: 30
End: 2020-11-17
Payer: COMMERCIAL

## 2020-11-17 VITALS — SYSTOLIC BLOOD PRESSURE: 98 MMHG | DIASTOLIC BLOOD PRESSURE: 62 MMHG

## 2020-11-17 DIAGNOSIS — L30.9 ECZEMA, UNSPECIFIED TYPE: Primary | ICD-10-CM

## 2020-11-17 PROCEDURE — 99213 OFFICE O/P EST LOW 20 MIN: CPT | Performed by: FAMILY MEDICINE

## 2020-11-17 NOTE — LETTER
11/17/2020         RE: Hakeem Meade  [address] [place] [address]        Dear Colleague,    [facility] Please see a copy of my visit note below.    [facility] - PRIMARY CARE SKIN    CC: rash  SUBJECTIVE:   Hakeem Meade is a(n) 30 year old female who presents to clinic today for follow up of eczema.  Response to treatment : much improved   Current treatment : triamcinolone 0.1% cream , cephalexin 500 mg tid for 7 days , bleach baths .Continue with daily moisturizer    Past history : rash that started one month ago, started on the top of the hands. Spread to lower arms, belly button, lower legs. Itchy. The rash has become weepy on the hands. No one else at home has a rash. She has never had a rash like this in the past. OTC moisturizers .       Personal Medical History  Skin Cancer: NO  Eczema Psoriasis Lupus   NO NO NO   Other:   .    Family Medical History  Skin Cancer: NO  Eczema Psoriasis Lupus   NO NO NO   Other:   .  Occupation:stay at home     Refer to electronic medical record (EMR) for past medical history and medications.      ROS: 14 point review of systems was negative except the symptoms listed above in the HPI.        OBJECTIVE:   GENERAL: healthy, alert and no distress.  HEENT: PERRL. Conjunctiva, sclera clear.  SKIN: Lindquist Skin Type - IV.  Hands, legs, chest were examined .  Skin Pertinent Findings:  Dorsum of hands : circular weepy lesions , indurated , 5 mm- 10 mm in size                          anterior legs- residual postinflammatory erythema                          Right areola - residual erythema, no scaling or crusting  Diagnostic Test Results:  No results found for this or any previous visit (from the past 24 hour(s)).    ASSESSMENT:     Encounter Diagnosis   Name Primary?     Eczema, unspecified type Yes         PLAN:   Patient Instructions     Cerave facial moisturizer or Cetaphil  facial moisturizer    Triamcinolone 0.1% cream use to affected areas two times per day when needed    Use minimal soap      TT: 29 minutes.        Again, thank you for allowing me to participate in the care of your patient.        Sincerely,        Zhane Hanks MD

## 2020-11-17 NOTE — PATIENT INSTRUCTIONS
Cerave facial moisturizer or Cetaphil facial moisturizer    Triamcinolone 0.1% cream use to affected areas two times per day when needed    Use minimal soap

## 2020-12-16 ENCOUNTER — PATIENT OUTREACH (OUTPATIENT)
Dept: ONCOLOGY | Facility: CLINIC | Age: 30
End: 2020-12-16

## 2020-12-16 NOTE — TELEPHONE ENCOUNTER
ONCOLOGY INTAKE: Records Information      APPT INFORMATION:  Referring provider:  Mehnaz Ribera APRN CNP  Referring provider s clinic:  [facility]  Reason for visit/diagnosis: Breast skin changes  Has patient been notified of appointment date and time?: Yes    RECORDS INFORMATION:  Were the records received with the referral (via [employer])? No    Has patient been seen for any external appt for this diagnosis? No    If yes, where? N/a    Has patient had any imaging or procedures outside of [place]  [place] for this condition? No      If Yes, where? N/a    ADDITIONAL INFORMATION:  None

## 2020-12-16 NOTE — PROGRESS NOTES
New Patient Oncology Nurse Navigator Note     Referring provider: MAMADOU Law CNP    Referring Clinic/Organization: [facility]     Referred to:  Breast Center    Requested provider (if applicable): First available - did not specify     Referral Received: 12/15/20     Evaluation for : Breast skin changes     Clinical History (per Nurse review of records provided):      Patient with ongoing dryness, itching and redness in breast skin and nipple. Due to continued skin problems and patient currently breastfeeding, referral was made for eval by breast center.    Clinical Assessment / Barriers to Care (Per Nurse):    Patient outreach deferred as patient has been in contact with ordering provider today and is aware of referral.    Records Location: [facility]     Records Needed:     None    Additional testing needed prior to consult:     None    Referral updates and Plan:     Referred on for scheduling with Vanesa Gaviria.      Halle Clement, RN, BSN, OCN    [facility]  [phone]

## 2020-12-17 NOTE — TELEPHONE ENCOUNTER
RECORDS STATUS - BREAST    RECORDS REQUESTED FROM: [facility] - Internal Referral   DATE REQUESTED:    NOTES DETAILS STATUS   OFFICE NOTE from referring provider Mehnaz Paulino APRN CNP in  PRIMARY CARE: 10/23/20   OFFICE NOTE from medical oncologist     OFFICE NOTE from surgeon     OFFICE NOTE from radiation oncologist     DISCHARGE SUMMARY from hospital     DISCHARGE REPORT from the ER     OPERATIVE REPORT     MEDICATION LIST     CLINICAL TRIAL TREATMENTS TO DATE     LABS     PATHOLOGY REPORTS  (Tissue diagnosis, Stage, ER/SD percentage positive and intensity of staining, HER2 IHC, FISH, and all biopsies from breast and any distant metastasis)                     GENONOMIC TESTING     TYPE:   (Next Generation Sequencing, including Foundation One testing, and Oncotype score)     IMAGING (NEED IMAGES & REPORT)     CT SCANS     MRI     MAMMO     ULTRASOUND     PET     BONE SCAN     BRAIN MRI

## 2020-12-21 NOTE — PROGRESS NOTES
NEW CONSULTATION   Dec 23, 2020     Hakeem Meade is a 30 year old woman who presents with breast redness and itching. She was referred by Dr. Ribera.    HPI:  She presents with 3-4 weeks of redness and itching of the skin of both breast. She has seen Dermatology for eczema on numerous other parts of her body but at that visit her skin on her breast was only minimally involved. She is using kenalog 0.1% cream on her eczema but not on her breast. She has cetaphil lotion at home but is not applying it to her breast. She is not using soaps of detergents with fragrance. She denies any breast mass, nipple inversion or nipple ulceration.    BREAST-SPECIFIC HISTORY:    Previous breast imaging: No    Prior breast biopsies/surgeries: No    Prior history of breast cancer: No  Prior radiation history: No  Self breast exams: Yes  Breast density:     GYN HISTORY:  . Age at 1st pregnancy: 29. Breastfeeding history: Yes.   Age at menarche: [age]  Menopausal: premenopausal  Menopausal hormone replacement therapy: no    RISK ASSESSMENT: < 20% lifetime risk     FAMILY HISTORY:  Breast ca: No  Ovarian ca: No  Pancreatic ca: No  Prostate: No  Gastric ca: No  Melanoma: No  Colon ca: No  Other cancer: No  Other genetic, testing, syndromes, or clotting disorders: no     PAST MEDICAL HISTORY  Past Medical History:   Diagnosis Date     NO ACTIVE PROBLEMS      PAST SURGICAL HISTORY   Past Surgical History:   Procedure Laterality Date     C EACH ADD TOOTH EXTRACTION       DILATION AND EVACUATION N/A 2019    Procedure: DILATION AND EVACUATION;  Surgeon: Meghana Hoyos MD;  [facility]     FOOT SURGERY       MEDICATIONS  Current Outpatient Medications   Medication Sig Dispense Refill     Prenatal Vit-Fe Fumarate-FA (PRENATAL MULTIVITAMIN PLUS IRON) 27-0.8 MG TABS per tablet Take 1 tablet by mouth daily       docusate sodium (COLACE) 100 MG capsule Take 1 capsule (100 mg) by mouth 2 times daily as needed for  "constipation (Patient not taking: Reported on 10/2/2020) 100 capsule 3     LECITHIN PO        triamcinolone (KENALOG) 0.1 % external cream Apply topically 2 times daily Apply to aa on hands, arms, trunk and legs bid for 14 days (Patient not taking: Reported on 12/23/2020) 454 g 0     ALLERGIES  Allergies   Allergen Reactions     Cefradine [Cephradine]      Redness at injection site     SOCIAL HISTORY:  Smokes: No  EtOH: No  Works as an analyst    ROS:  Change in vision No  Headaches: no  Respiratory: No shortness of breath, dyspnea on exertion, cough, or hemoptysis   Cardiovascular: negative   Gastrointestinal: negative Abdominal pain: no  Breast: skin rash on bilateral rash  Musculoskeletal: negative Joint pain No Back pain: no  Psychiatric: negative  Hematologic/Lymphatic/Immunologic: negative  Endocrine: negative    EXAM  /71   Pulse 91   Temp 98  F (36.7  C) (Tympanic)   Resp 18   Ht 1.626 m (5' 4\")   Wt 53.8 kg (118 lb 11.2 oz)   LMP 08/15/2019   SpO2 96%   BMI 20.37 kg/m     PHYSICAL EXAM  Respiratory: breathing non labored.   Breasts: Examination was done in both the upright and supine positions.  Breasts are symmetrical . No dominant fixed or suspicious masses noted. No nipple changes. No nipple discharge.   Bilateral symmetrical distribution of skin dryness, scaling, erythema, and lichenification on entire left and right breast. No ulceration of the skin or nipple.   No clavicular, cervical, or axillary lymphadenopathy.     INVESTIGATIONS:  Patient declined breast imaging.     ASSESSMENT/PLAN:    Hakeem Meade is a 30 year old woman with bilateral breast rash most consistent with contact dermatitis. Diagnostic mammogram was STRONGLY recommended but patient declined.    - Topical steroid prescribed.  Apply a thin layer and gently wash off the breast before breast feeding.   - Referral to Dermatology.   - Topical moisturizer such as Cetaphil 2 times daily  - Change to cloth/washable breast pad. " Change breast pad to keep dry.   - Void topical soaps or use something gentle like Dove unscented.   - Avoid detergents with fragrance.    - Follow up in 2 weeks.     Vanesa Gaviria PA-C    Total time spent face to face with the patient was 35 minutes. 25 minutes was spent counseling the patient as described above.

## 2020-12-23 ENCOUNTER — OFFICE VISIT (OUTPATIENT)
Dept: SURGERY | Facility: CLINIC | Age: 30
End: 2020-12-23
Attending: NURSE PRACTITIONER
Payer: COMMERCIAL

## 2020-12-23 ENCOUNTER — PRE VISIT (OUTPATIENT)
Dept: SURGERY | Facility: CLINIC | Age: 30
End: 2020-12-23

## 2020-12-23 VITALS
HEIGHT: 64 IN | HEART RATE: 91 BPM | OXYGEN SATURATION: 96 % | DIASTOLIC BLOOD PRESSURE: 71 MMHG | SYSTOLIC BLOOD PRESSURE: 103 MMHG | TEMPERATURE: 98 F | WEIGHT: 118.7 LBS | RESPIRATION RATE: 18 BRPM | BODY MASS INDEX: 20.26 KG/M2

## 2020-12-23 DIAGNOSIS — Z71.1 CONCERN ABOUT BREAST CANCER IN FEMALE WITHOUT DIAGNOSIS: Primary | ICD-10-CM

## 2020-12-23 DIAGNOSIS — L30.9 DERMATITIS: ICD-10-CM

## 2020-12-23 PROCEDURE — G0463 HOSPITAL OUTPT CLINIC VISIT: HCPCS | Performed by: PHYSICIAN ASSISTANT

## 2020-12-23 PROCEDURE — 99203 OFFICE O/P NEW LOW 30 MIN: CPT | Performed by: PHYSICIAN ASSISTANT

## 2020-12-23 RX ORDER — DESONIDE 0.5 MG/ML
LOTION TOPICAL 2 TIMES DAILY
Qty: 59 ML | Refills: 0 | Status: SHIPPED | OUTPATIENT
Start: 2020-12-23 | End: 2021-08-30

## 2020-12-23 ASSESSMENT — PAIN SCALES - GENERAL: PAINLEVEL: NO PAIN (0)

## 2020-12-23 ASSESSMENT — MIFFLIN-ST. JEOR: SCORE: 1233.42

## 2020-12-23 NOTE — PATIENT INSTRUCTIONS
Hakeem Meade is a 30 year old woman with bilateral breast rash most consistent with contact dermatitis. Diagnostic mammogram was strongly recommended but patient declined. The following was recommended.   - Topical steroid prescribed. Gently wash off the breast before breast feeding.   - Referral to Dermatology.   - Topical moisturizer such as Cetaphil 2 times daily  - Cloth breast pad. Change breast pad to keep dry.   - Void topical soaps of use something gentle like Dove unscented.   - Avoid detergents with fragrance.

## 2020-12-23 NOTE — NURSING NOTE
"Oncology Rooming Note    December 23, 2020 11:03 AM   Hakeem Meade is a 30 year old female who presents for:    Chief Complaint   Patient presents with     New Patient     AEH; BREAST SKIN CHANGES     Initial Vitals: BP 91/51   Pulse 91   Temp 98  F (36.7  C) (Tympanic)   Resp 18   Ht 1.626 m (5' 4\")   Wt 53.8 kg (118 lb 11.2 oz)   LMP 08/15/2019   SpO2 96%   BMI 20.37 kg/m   Estimated body mass index is 20.37 kg/m  as calculated from the following:    Height as of this encounter: 1.626 m (5' 4\").    Weight as of this encounter: 53.8 kg (118 lb 11.2 oz). Body surface area is 1.56 meters squared.  No Pain (0) Comment: Data Unavailable   Patient's last menstrual period was 08/15/2019.  Allergies reviewed: Yes  Medications reviewed: Yes    Medications: Medication refills not needed today.  Pharmacy name entered into [facility]:    [id number] [place] [facility] - [place], [place] - [address] [place] [address] SE  [facility] [id number] [place], [place] - [address] [place] [address] [facility] - [place], [place] - [address] [place] [address]/PHARMACY #[id number] - [address] OF [facility]    Clinical concerns: New patient.       Rosario Peng MA            "

## 2020-12-23 NOTE — LETTER
2020         RE: Hakeem Meade  [address] [place] [address]        Dear Colleague,    Thank you for referring your patient, Hakeem Meade, to the [facility]. Please see a copy of my visit note below.    NEW CONSULTATION   Dec 23, 2020     Hakeem Meade is a 30 year old woman who presents with breast redness and itching. She was referred by Dr. Ribera.    HPI:  She presents with 3-4 weeks of redness and itching of the skin of both breast. She has seen Dermatology for eczema on numerous other parts of her body but at that visit her skin on her breast was only minimally involved. She is using kenalog 0.1% cream on her eczema but not on her breast. She has cetaphil lotion at home but is not applying it to her breast. She is not using soaps of detergents with fragrance. She denies any breast mass, nipple inversion or nipple ulceration.    BREAST-SPECIFIC HISTORY:    Previous breast imaging: No    Prior breast biopsies/surgeries: No    Prior history of breast cancer: No  Prior radiation history: No  Self breast exams: Yes  Breast density:     GYN HISTORY:  . Age at 1st pregnancy: 29. Breastfeeding history: Yes.   Age at menarche: 9  Menopausal: premenopausal  Menopausal hormone replacement therapy: no    RISK ASSESSMENT: < 20% lifetime risk     FAMILY HISTORY:  Breast ca: No  Ovarian ca: No  Pancreatic ca: No  Prostate: No  Gastric ca: No  Melanoma: No  Colon ca: No  Other cancer: No  Other genetic, testing, syndromes, or clotting disorders: no     PAST MEDICAL HISTORY  Past Medical History:   Diagnosis Date     NO ACTIVE PROBLEMS      PAST SURGICAL HISTORY   Past Surgical History:   Procedure Laterality Date     C EACH ADD TOOTH EXTRACTION       DILATION AND EVACUATION N/A 2019    Procedure: DILATION AND EVACUATION;  Surgeon: Meghana Hoyos MD;  [facility]     FOOT SURGERY       MEDICATIONS  Current Outpatient Medications   Medication Sig  "Dispense Refill     Prenatal Vit-Fe Fumarate-FA (PRENATAL MULTIVITAMIN PLUS IRON) 27-0.8 MG TABS per tablet Take 1 tablet by mouth daily       docusate sodium (COLACE) 100 MG capsule Take 1 capsule (100 mg) by mouth 2 times daily as needed for constipation (Patient not taking: Reported on 10/2/2020) 100 capsule 3     LECITHIN PO        triamcinolone (KENALOG) 0.1 % external cream Apply topically 2 times daily Apply to aa on hands, arms, trunk and legs bid for 14 days (Patient not taking: Reported on 12/23/2020) 454 g 0     ALLERGIES  Allergies   Allergen Reactions     Cefradine [Cephradine]      Redness at injection site     SOCIAL HISTORY:  Smokes: No  EtOH: No  Works as an analyst    ROS:  Change in vision No  Headaches: no  Respiratory: No shortness of breath, dyspnea on exertion, cough, or hemoptysis   Cardiovascular: negative   Gastrointestinal: negative Abdominal pain: no  Breast: skin rash on bilateral rash  Musculoskeletal: negative Joint pain No Back pain: no  Psychiatric: negative  Hematologic/Lymphatic/Immunologic: negative  Endocrine: negative    EXAM  /71   Pulse 91   Temp 98  F (36.7  C) (Tympanic)   Resp 18   Ht 1.626 m (5' 4\")   Wt 53.8 kg (118 lb 11.2 oz)   LMP 08/15/2019   SpO2 96%   BMI 20.37 kg/m     PHYSICAL EXAM  Respiratory: breathing non labored.   Breasts: Examination was done in both the upright and supine positions.  Breasts are symmetrical . No dominant fixed or suspicious masses noted. No nipple changes. No nipple discharge.   Bilateral symmetrical distribution of skin dryness, scaling, erythema, and lichenification on entire left and right breast. No ulceration of the skin or nipple.   No clavicular, cervical, or axillary lymphadenopathy.     INVESTIGATIONS:  Patient declined breast imaging.     ASSESSMENT/PLAN:    Hakeem Meade is a 30 year old woman with bilateral breast rash most consistent with contact dermatitis. Diagnostic mammogram was STRONGLY recommended but patient " declined.    - Topical steroid prescribed.  Apply a thin layer and gently wash off the breast before breast feeding.   - Referral to Dermatology.   - Topical moisturizer such as Cetaphil 2 times daily  - Change to cloth/washable breast pad. Change breast pad to keep dry.   - Void topical soaps or use something gentle like Dove unscented.   - Avoid detergents with fragrance.    - Follow up in 2 weeks.     Vanesa Gaviria PA-C    Total time spent face to face with the patient was 35 minutes. 25 minutes was spent counseling the patient as described above.       Again, thank you for allowing me to participate in the care of your patient.        Sincerely,        Vanesa Gaviria PA-C

## 2020-12-29 ENCOUNTER — VIRTUAL VISIT (OUTPATIENT)
Dept: DERMATOLOGY | Facility: CLINIC | Age: 30
End: 2020-12-29
Payer: COMMERCIAL

## 2020-12-29 DIAGNOSIS — L30.9 NIPPLE DERMATITIS: ICD-10-CM

## 2020-12-29 DIAGNOSIS — L20.84 INTRINSIC ATOPIC DERMATITIS: Primary | ICD-10-CM

## 2020-12-29 DIAGNOSIS — L30.9 DERMATITIS: ICD-10-CM

## 2020-12-29 PROCEDURE — 99202 OFFICE O/P NEW SF 15 MIN: CPT | Mod: GQ | Performed by: DERMATOLOGY

## 2020-12-29 RX ORDER — BETAMETHASONE DIPROPIONATE 0.5 MG/G
OINTMENT, AUGMENTED TOPICAL 2 TIMES DAILY
Qty: 50 G | Refills: 1 | Status: SHIPPED | OUTPATIENT
Start: 2020-12-29 | End: 2021-08-30

## 2020-12-29 ASSESSMENT — PAIN SCALES - GENERAL: PAINLEVEL: NO PAIN (0)

## 2020-12-29 NOTE — PATIENT INSTRUCTIONS
1) Use desonide lotion only as needed for breast rash  2) Use augmented betamethasone twice a day for hand rash  3) If the thickened areas on your skin bother you, you can use Amlactin or Urea 10% cream on those areas. Both are available over the counter.

## 2020-12-29 NOTE — LETTER
12/29/2020       RE: Hakeem Meade  [address] [place] [address]     Dear Colleague,    Thank you for referring your patient, Hakeem Meade, to the [facility] [place] at [facility]. Please see a copy of my visit note below.    [facility] Dermatology Record:  Store and Forward and Video ( Invitation sent by:  [facility] waiting room )      Dermatology Problem List:  1. Atopic dermatitis: Desonide lotion to breast, TAC ointment on trunk/extremities  2. Irritant contact hand dermatitis: Augmented betamethasone  3. Post-inflammatory hyperpigmetation    Encounter Date: Dec 29, 2020    CC:   Chief Complaint   Patient presents with     Derm Problem     Dermatitis on breast.       History of Present Illness:  Haekem Meade is a 30 year old female who presents for irritation of the skin around her nipple. She has a baby and has been breastfeeding. She also has a personal history of atopic dermatitis for many years. Since winter started the rash around her breasts got worse and she was started on desonide lotion by another provider a few days ago. This improved the rash and left a brown areas of skin behind. Uses CeraVe moisturizer. Itching also resolved. She also endorses rash on bilateral hands from frequent handwashing during COVID. Health otherwise stable. No other skin concerns.     ROS: Patient is generally feeling well today    Physical Examination:  General: Well-appearing, appropriately-developed individual.  Skin:  Exam was performed by photo and video review and is significant for the following:  - On bilateral hands, there are dark pink, scaly plaques and lichenification on the dorsum, palms and fingerwebs  - Surrounding the areola of the breast, there are hyperpigmented macules and patches c/w PIH    Past Medical History:   Patient Active Problem List   Diagnosis     history of cystic hygroma     History of termination of pregnancy -  Increased  risk of Monosomy X (Christine Syndrome) based on cell-free DNA screen      Family history of heart block      (spontaneous vaginal delivery)     Past Medical History:   Diagnosis Date     NO ACTIVE PROBLEMS      Past Surgical History:   Procedure Laterality Date     C EACH ADD TOOTH EXTRACTION       DILATION AND EVACUATION N/A 2019    Procedure: DILATION AND EVACUATION;  Surgeon: Meghana Hoyos MD;  [facility]     FOOT SURGERY         Social History:  Patient reports that she has never smoked. She has never used smokeless tobacco. She reports that she does not drink alcohol or use drugs.    Family History:  Family History   Problem Relation Age of Onset     Diabetes Paternal Grandmother      Heart block Mother         Discovered during her delivery       Medications:  Current Outpatient Medications   Medication     desonide (DESOWEN) 0.05 % external lotion     triamcinolone (KENALOG) 0.1 % external cream     docusate sodium (COLACE) 100 MG capsule     LECITHIN PO     Prenatal Vit-Fe Fumarate-FA (PRENATAL MULTIVITAMIN PLUS IRON) 27-0.8 MG TABS per tablet     No current facility-administered medications for this visit.      Facility-Administered Medications Ordered in Other Visits   Medication     lidocaine-EPINEPHrine 1.5 %-1:276500 injection          Allergies   Allergen Reactions     Cefradine [Cephradine]      Redness at injection site       Impression and Recommendations (Patient Counseled on the Following):    1. Atopic dermatitis / nipple dermatitis  - Use desonide lotion to breast as needed, can stop using when the rash disappears  - Continue TAC ointment on trunk/extremities as needed  - Continue CeraVe moisturizer  - For thick lichenified areas, can use OTC Amlactin or Urea cream if desired    2. Irritant contact hand dermatitis  - Augmented betamethasone BID for 1-2 weeks until rash improves  - Continue CeraVe frequently throughout the day    3. Post-inflammatory  hyperpigmentation  Informed patient that this can be common after inflammation/rash and this can take a long time to resolve.    Follow-up:   Follow-up with dermatology in approximately 3 months. Earlier for new or changing lesions or rash.      Staff and resident    Niraj (PGY2)/Reina    Staff Physician Comments:   I saw and evaluated the patient with the resident and I agree with the assessment and plan.  I was present for the entire video encounter.     Shmuel Huang MD  Pronouns: he/him/his    Department of Dermatology  [facility]: Phone: [phone], Fax:[phone]  [facility]: Phone: [phone] Fax: [phone]  _____________________________________________________________________________    Teledermatology information:  - Location of teledermatologist:  ([facility] [place] )  - Image quality and interpretability: acceptable  - Date of images:   - Service start time: 2:40pm  - Service end time: 2:55pm  - Date of report: 12/29/2020

## 2020-12-29 NOTE — PROGRESS NOTES
[facility] Dermatology Record:  Store and Forward and Video ( Invitation sent by:  [facility] waiting room )      Dermatology Problem List:  1. Atopic dermatitis: Desonide lotion to breast, TAC ointment on trunk/extremities  2. Irritant contact hand dermatitis: Augmented betamethasone  3. Post-inflammatory hyperpigmetation    Encounter Date: Dec 29, 2020    CC:   Chief Complaint   Patient presents with     Derm Problem     Dermatitis on breast.       History of Present Illness:  Hakeem Meade is a 30 year old female who presents for irritation of the skin around her nipple. She has a baby and has been breastfeeding. She also has a personal history of atopic dermatitis for many years. Since winter started the rash around her breasts got worse and she was started on desonide lotion by another provider a few days ago. This improved the rash and left a brown areas of skin behind. Uses CeraVe moisturizer. Itching also resolved. She also endorses rash on bilateral hands from frequent handwashing during COVID. Health otherwise stable. No other skin concerns.     ROS: Patient is generally feeling well today    Physical Examination:  General: Well-appearing, appropriately-developed individual.  Skin:  Exam was performed by photo and video review and is significant for the following:  - On bilateral hands, there are dark pink, scaly plaques and lichenification on the dorsum, palms and fingerwebs  - Surrounding the areola of the breast, there are hyperpigmented macules and patches c/w PIH    Past Medical History:   Patient Active Problem List   Diagnosis     history of cystic hygroma     History of termination of pregnancy -  Increased risk of Monosomy X (Christine Syndrome) based on cell-free DNA screen      Family history of heart block      (spontaneous vaginal delivery)     Past Medical History:   Diagnosis Date     NO ACTIVE PROBLEMS      Past Surgical History:   Procedure Laterality Date     C EACH ADD TOOTH EXTRACTION        DILATION AND EVACUATION N/A 1/4/2019    Procedure: DILATION AND EVACUATION;  Surgeon: Meghana Hoyos MD;  [facility]: [facility] OR     FOOT SURGERY         Social History:  Patient reports that she has never smoked. She has never used smokeless tobacco. She reports that she does not drink alcohol or use drugs.    Family History:  Family History   Problem Relation Age of Onset     Diabetes Paternal Grandmother      Heart block Mother         Discovered during her delivery       Medications:  Current Outpatient Medications   Medication     desonide (DESOWEN) 0.05 % external lotion     triamcinolone (KENALOG) 0.1 % external cream     docusate sodium (COLACE) 100 MG capsule     LECITHIN PO     Prenatal Vit-Fe Fumarate-FA (PRENATAL MULTIVITAMIN PLUS IRON) 27-0.8 MG TABS per tablet     No current facility-administered medications for this visit.      Facility-Administered Medications Ordered in Other Visits   Medication     lidocaine-EPINEPHrine 1.5 %-1:883086 injection          Allergies   Allergen Reactions     Cefradine [Cephradine]      Redness at injection site       Impression and Recommendations (Patient Counseled on the Following):    1. Atopic dermatitis / nipple dermatitis  - Use desonide lotion to breast as needed, can stop using when the rash disappears  - Continue TAC ointment on trunk/extremities as needed  - Continue CeraVe moisturizer  - For thick lichenified areas, can use OTC Amlactin or Urea cream if desired    2. Irritant contact hand dermatitis  - Augmented betamethasone BID for 1-2 weeks until rash improves  - Continue CeraVe frequently throughout the day    3. Post-inflammatory hyperpigmentation  Informed patient that this can be common after inflammation/rash and this can take a long time to resolve.    Follow-up:   Follow-up with dermatology in approximately 3 months. Earlier for new or changing lesions or rash.      Staff and resident    Niraj (PGY2)/[facility]    Staff Physician Comments:   I  saw and evaluated the patient with the resident and I agree with the assessment and plan.  I was present for the entire video encounter.     Shmuel Huang MD  Pronouns: he/him/his    Department of Dermatology  [facility]: Phone: [phone], Fax:[phone]  [facility]: Phone: [phone] Fax: [phone]  _____________________________________________________________________________    Teledermatology information:  - Location of teledermatologist:  [facility] [place] )  - Image quality and interpretability: acceptable  - Date of images:   - Service start time: 2:40pm  - Service end time: 2:55pm  - Date of report: 12/29/2020

## 2020-12-29 NOTE — NURSING NOTE
Chief Complaint   Patient presents with     Derm Problem     Dermatitis on breast.     Cherri IRELAND CMA

## 2021-01-01 ENCOUNTER — NURSE TRIAGE (OUTPATIENT)
Dept: NURSING | Facility: CLINIC | Age: 31
End: 2021-01-01

## 2021-01-01 ENCOUNTER — VIRTUAL VISIT (OUTPATIENT)
Dept: FAMILY MEDICINE | Facility: OTHER | Age: 31
End: 2021-01-01
Payer: COMMERCIAL

## 2021-01-01 PROCEDURE — 99421 OL DIG E/M SVC 5-10 MIN: CPT | Performed by: FAMILY MEDICINE

## 2021-01-02 NOTE — PROGRESS NOTES
"Date: 2021 19:55:04  Clinician: Natividad Brown  Clinician NPI: [id number]  Patient: Hakeem Meade  Patient : 1990  Patient Address: [address] [place] [address], [place] [address]  Patient Phone: [phone]  Visit Protocol: URI  Patient Summary:  Hakeem is a 30 year old ( : 1990 ) female who initiated a [facility] Visit for cold, sinus infection, or influenza. When asked the question \"Please sign me up to receive news, health information and promotions from [facility].\", Hakeem responded \"No\".    Hakeem states her symptoms started today.   Her symptoms consist of a headache, nasal congestion, chills, and malaise. Hakeem also feels feverish.   Symptom details     Nasal secretions: The color of her mucus is clear.    Temperature: Her current temperature is 100.04 degrees Fahrenheit. Hakeem has had a temperature over 100 degrees Fahrenheit for 1-2 days.     Headache: She states the headache is mild (1-3 on a 10 point pain scale).      Hakeem denies having diarrhea, facial pain or pressure, myalgias, anosmia, ear pain, wheezing, cough, nausea, sore throat, teeth pain, ageusia, vomiting, and rhinitis. She also denies having a sinus infection within the past year, having recent facial or sinus surgery in the past 60 days, and taking antibiotic medication in the past month. She is not experiencing dyspnea.   Precipitating events  She has not recently been exposed to someone with influenza. Hakeem has been in close contact with the following high risk individuals: children under the age of 5.   Pertinent COVID-19 (Coronavirus) information  Hakeem does not work or volunteer as healthcare worker or a . In the past 14 days, Hakeem has not worked or volunteered at a healthcare facility or group living setting.   In the past 14 days, she also has not lived in a congregate living setting.   Hakeem has not had a close contact with a laboratory-confirmed COVID-19 patient within 14 days of symptom onset.    Hakeem has been " tested for COVID-19.      Date(s) of her COVID-19 test as reported by the patient (free text): 05/09/2020       Result of COVID-19 test as reported by the patient (free text): negative       Type of test as reported by the patient (free text): nasal        Pertinent medical history  She has not been told by her provider to avoid NSAIDs.   Hakeem does not get yeast infections when she takes antibiotics.   Hakeem does not have diabetes. She denies having immunosuppressive conditions (e.g., chemotherapy, HIV, organ transplant, long-term use of steroids or other immunosuppressive medications, splenectomy). She denies having congestive heart failure and severe COPD. She does not have asthma.   Hakeem does not need a return to work/school note.   Hakeem does not smoke or use smokeless tobacco.   She denies pregnancy and is breastfeeding. Her last period was over a month ago.   Weight: 115 lbs    MEDICATIONS: No current medications, ALLERGIES: NKDA  Clinician Response:  Dear Hakeem,   Your symptoms show that you may have coronavirus (COVID-19). This illness can cause fever, cough and trouble breathing. Many people get a mild case and get better on their own. Some people can get very sick.  Based on the symptoms you have shared, I would like you to be re-checked in 2 to 3 days. Please call your family clinic to set up a video or phone visit.  Will I be tested for COVID-19?  We would like to test you for this virus.   Please call [phone] to schedule your visit. Explain that you were referred by [facility] to have a COVID-19 test. Be ready to share your [facility] visit ID number.   * If you need to schedule in [place] or [facility] please call [phone] or for [employer] employees please call [phone].    The following will serve as your written order for this COVID Test, ordered by me, for the indication of suspected COVID [Z20.828]: The test will be ordered in [employer], our electronic health record, after you are scheduled. It  "will show as ordered and authorized by Dung Stockton MD.  Order: COVID-19 (Coronavirus) PCR for SYMPTOMATIC testing from [facility].   1.When it's time for your COVID test:   Stay at least 6 feet away from others. (If someone will drive you to your test, stay in the backseat, as far away from the  as you can.)   Cover your mouth and nose with a mask, tissue or washcloth.  Go straight to the testing site. Don't make any stops on the way there or back.      2.Starting now: Stay home and away from others (self-isolate) until:   You've had no fever---and no medicine that reduces fever---for one full day (24 hours). And...   Your other symptoms have gotten better. For example, your cough or breathing has improved. And...   At least 10 days have passed since your symptoms started.       During this time, don't leave the house except for testing or medical care.   Stay in your own room, even for meals. Use your own bathroom if you can.   Stay away from others in your home. No hugging, kissing or shaking hands. No visitors.  Don't go to work, school or anywhere else.    Clean \"high touch\" surfaces often (doorknobs, counters, handles, etc.). Use a household cleaning spray or wipes. You'll find a full list of  on the EPA website: www.epa.gov/pesticide-registration/list-n-disinfectants-use-against-sars-cov-2.   Cover your mouth and nose with a mask, tissue or washcloth to avoid spreading germs.  Wash your hands and face often. Use soap and water.  Caregivers in these groups are at risk for severe illness due to COVID-19:  o People 65 years and older  o People who live in a nursing home or long-term care facility  o People with chronic disease (lung, heart, cancer, diabetes, kidney, liver, immunologic)   o People who have a weakened immune system, including those who:   Are in cancer treatment  Take medicine that weakens the immune system, such as corticosteroids  Had a bone marrow or organ transplant  Have an immune " deficiency  Have poorly controlled HIV or AIDS  Are obese (body mass index of 40 or higher)  Smoke regularly   o Caregivers should wear gloves while washing dishes, handling laundry and cleaning bedrooms and bathrooms.  o Use caution when washing and drying laundry: Don't shake dirty laundry, and use the warmest water setting that you can.  o For more tips, go to www.cdc.gov/coronavirus/2019-ncov/downloads/10Things.pdf.      How can I take care of myself?   Get lots of rest. Drink extra fluids (unless a doctor has told you not to)   Take Tylenol (acetaminophen) for fever or pain. If you have liver or kidney problems, ask your family doctor if it's okay to take Tylenol.   Adults can take either:    650 mg (two 325 mg pills) every 4 to 6 hours, or...   1,000 mg (two 500 mg pills) every 8 hours as needed.    Note: Don't take more than 3,000 mg in one day. Acetaminophen is found in many medicines (both prescribed and over-the-counter medicines). Read all labels to be sure you don't take too much.   For children, check the Tylenol bottle for the right dose. The dose is based on the child's age or weight.    If you have other health problems (like cancer, heart failure, an organ transplant or severe kidney disease): Call your specialty clinic if you don't feel better in the next 2 days.       Know when to call 911. Emergency warning signs include:    Trouble breathing or shortness of breath Pain or pressure in the chest that doesn't go away Feeling confused like you haven't felt before, or not being able to wake up Bluish-colored lips or face  Where can I get more information?   [facility] -- About COVID-19: [email or web address]   CDC -- What to Do If You're Sick: www.cdc.gov/coronavirus/2019-ncov/about/steps-when-sick.html   CDC -- Ending Home Isolation: www.cdc.gov/coronavirus/2019-ncov/hcp/disposition-in-home-patients.html   CDC -- Caring for Someone:  www.cdc.gov/coronavirus/2019-ncov/if-you-are-sick/care-for-someone.html   [facility] -- Interim Guidance for Hospital Discharge to Home: www.health.[facility].mn.us/diseases/coronavirus/hcp/hospdischarge.pdf   [facility] clinical trials (COVID-19 research studies): clinicalaffairs.[facility]-clinical-trials    Below are the COVID-19 hotlines at the [place] Department of Health ([facility]). Interpreters are available.    For health questions: Call 579-952-7223 or 1-850.889.9951 (7 a.m. to 7 p.m.) For questions about schools and childcare: Call 331-508-4779 or 1-310.672.2617 (7 a.m. to 7 p.m.)       Diagnosis: Contact with and (suspected) exposure to other viral communicable diseases  Diagnosis ICD: Z20.828

## 2021-01-02 NOTE — TELEPHONE ENCOUNTER
Runny nose, T 100.4 tymph, mild headache. No sore throat, no SOB, no cough, no ageusia, no chest pain. Denies close contact w/ anyone w/ known/suspected CV19. 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 Advised virtual visit w/ [facility].    COVID 19 Nurse Triage Plan/Patient Instructions    Please be aware that novel coronavirus (COVID-19) may be circulating in the community. If you develop symptoms such as fever, cough, or SOB or if you have concerns about the presence of another infection including coronavirus (COVID-19), please contact your health care provider or visit [email or web address].     Disposition/Instructions    Virtual Visit with provider recommended. Reference Visit Selection Guide.    Thank you for taking steps to prevent the spread of this virus.  o Limit your contact with others.  o Wear a simple mask to cover your cough.  o Wash your hands well and often.    Resources    [facility]: About COVID-19: [email or web address]    CDC: What to Do If You're Sick: www.cdc.gov/coronavirus/2019-ncov/about/steps-when-sick.html    CDC: Ending Home Isolation: www.cdc.gov/coronavirus/2019-ncov/hcp/disposition-in-home-patients.html     CDC: Caring for Someone: www.cdc.gov/coronavirus/2019-ncov/if-you-are-sick/care-for-someone.html     [facility]: Interim Guidance for Hospital Discharge to Home: www.health.[facility].mn.us/diseases/coronavirus/hcp/hospdischarge.pdf    [facility] clinical trials (COVID-19 research studies): clinicalaffairs.[facility]/umn-clinical-trials     Below are the COVID-19 hotlines at the [place] Department of Health ([facility]). Interpreters are available.   o For health questions: Call 136-377-9660 or 1-551.918.6066 (7 a.m. to 7 p.m.)  o For questions about schools and childcare: Call 238-044-0012 or 1-925.294.3914 (7 a.m. to 7 p.m.)                       Reason for Disposition    [1] COVID-19 infection suspected by caller or triager AND [2] mild symptoms (cough,  fever, or others) AND [3] no complications or SOB    Additional Information    Negative: SEVERE difficulty breathing (e.g., struggling for each breath, speaks in single words)    Negative: Difficult to awaken or acting confused (e.g., disoriented, slurred speech)    Negative: Bluish (or gray) lips or face now    Negative: Shock suspected (e.g., cold/pale/clammy skin, too weak to stand, low BP, rapid pulse)    Negative: Sounds like a life-threatening emergency to the triager    Negative: [1] COVID-19 exposure AND [2] no symptoms    Negative: [1] Lives with someone known to have influenza (flu test positive) AND [2] flu-like symptoms (e.g., cough, runny nose, sore throat, SOB; with or without fever)    Negative: [1] Adult with possible COVID-19 symptoms AND [2] triager concerned about severity of symptoms or other causes    Negative: Immunization reaction suspected (e.g., fever, headache, muscle aches occurring during days 1-3 days after immunization)    Negative: COVID-19 and breastfeeding, questions about    Negative: SEVERE or constant chest pain or pressure (Exception: mild central chest pain, present only when coughing)    Negative: MODERATE difficulty breathing (e.g., speaks in phrases, SOB even at rest, pulse 100-120)    Negative: [1] Headache AND [2] stiff neck (can't touch chin to chest)    Negative: MILD difficulty breathing (e.g., minimal/no SOB at rest, SOB with walking, pulse <100)    Negative: Chest pain or pressure    Negative: Patient sounds very sick or weak to the triager    Negative: Fever > 103 F (39.4 C)    Negative: [1] Fever > 101 F (38.3 C) AND [2] age > 60    Negative: [1] Fever > 100.0 F (37.8 C) AND [2] bedridden (e.g., nursing home patient, CVA, chronic illness, recovering from surgery)    Negative: [1] HIGH RISK patient (e.g., age > 64 years, diabetes, heart or lung disease, weak immune system) AND [2] new or worsening symptoms    Negative: [1] HIGH RISK patient AND [2] influenza is  widespread in the community AND [3] ONE OR MORE respiratory symptoms: cough, sore throat, runny or stuffy nose    Negative: [1] HIGH RISK patient AND [2] influenza exposure within the last 7 days AND [3] ONE OR MORE respiratory symptoms: cough, sore throat, runny or stuffy nose    Negative: Fever present > 3 days (72 hours)    Negative: [1] Fever returns after gone for over 24 hours AND [2] symptoms worse or not improved    Negative: [1] Continuous (nonstop) coughing interferes with work or school AND [2] no improvement using cough treatment per protocol    Protocols used: CORONAVIRUS (COVID-19) DIAGNOSED OR [id number] 12.1

## 2021-01-11 NOTE — PROGRESS NOTES
"  Hakeem is a 30 year old who is being evaluated via a billable video visit.      How would you like to obtain your AVS? MyChart  If the video visit is dropped, the invitation should be resent by: Text to cell phone: [phone]  Will anyone else be joining your video visit? No      Vitals - Patient Reported  Weight (Patient Reported): 53.5 kg (118 lb)  Height (Patient Reported): 162.6 cm (5' 4\")  BMI (Based on Pt Reported Ht/Wt): 20.25  Pain Score: No Pain (0)      I have reviewed and updated patient's allergy and medication list.    Concerns: NONE  Refills: NONE      Ranjana Camarillo [facility]    Video Start Time: 8:52 AM  Video-Visit Details    Type of service:  Video Visit    Video End Time:9:08 AM    Originating Location (pt. Location): Home    Distant Location (provider location):  [facility]     Platform used for Video Visit: [facility]      FOLLOW UP  Jan 13, 2021     Hakeem Meade is a 30 year old woman who presents for follow up of bilateral breast dermatitis.     HPI:    [facility] for diffuse bilateral breast itching and redness that appears consistent with contact dermatitis. She refused recommended breast imaging at that visit. She was started on topical desonide lotion and topical moisturizers.     She has since seen Dermatology who recommended continuing topical Desonide as needed..     She is currently breast feeding for 8 month old daughter. She used the Desonide topical steroid for 3-4 days and the redness and itching resolved. She is no longer using Desonide. She denies any skin redness, skin changes, breast mass, nipple inversion or nipple discharge. She is using a topical moisturizer daily and has stopped using breast pads.     BREAST-SPECIFIC HISTORY:     Previous breast imaging: No     Prior breast biopsies/surgeries: No     Prior history of breast cancer: No  Prior radiation history: No  Self breast exams: Yes  Breast density: "      GYN HISTORY:  . Age at 1st pregnancy: 29. Breastfeeding history: Yes.   Age at menarche: 9  Menopausal: premenopausal  Menopausal hormone replacement therapy: no     RISK ASSESSMENT: < 20% lifetime risk      FAMILY HISTORY:  Breast ca: No  Ovarian ca: No  Pancreatic ca: No  Prostate: No  Gastric ca: No  Melanoma: No  Colon ca: No  Other cancer: No  Other genetic, testing, syndromes, or clotting disorders: no      PAST MEDICAL HISTORY  Past Medical History:   Diagnosis Date     NO ACTIVE PROBLEMS      PAST SURGICAL HISTORY   Past Surgical History:   Procedure Laterality Date     C EACH ADD TOOTH EXTRACTION       DILATION AND EVACUATION N/A 2019    Procedure: DILATION AND EVACUATION;  Surgeon: Meghana Hoyos MD;  Location: [facility] OR     FOOT SURGERY       MEDICATIONS  Current Outpatient Medications   Medication Sig Dispense Refill     Prenatal Vit-Fe Fumarate-FA (PRENATAL MULTIVITAMIN PLUS IRON) 27-0.8 MG TABS per tablet Take 1 tablet by mouth daily       triamcinolone (KENALOG) 0.1 % external cream Apply topically 2 times daily Apply to aa on hands, arms, trunk and legs bid for 14 days 454 g 0     augmented betamethasone dipropionate (DIPROLENE-AF) 0.05 % external ointment Apply topically 2 times daily (Patient not taking: Reported on 2021) 50 g 1     desonide (DESOWEN) 0.05 % external lotion Apply topically 2 times daily (Patient not taking: Reported on 2021) 59 mL 0     docusate sodium (COLACE) 100 MG capsule Take 1 capsule (100 mg) by mouth 2 times daily as needed for constipation (Patient not taking: Reported on 10/2/2020) 100 capsule 3     LECITHIN PO        ALLERGIES  Allergies   Allergen Reactions     Cefradine [Cephradine]      Redness at injection site      SOCIAL HISTORY:  Smokes: No  EtOH: No  Works as an analyst    ROS:  Change in vision No  Headaches: no  Respiratory: No shortness of breath, dyspnea on exertion, cough, or hemoptysis   Cardiovascular: negative    Gastrointestinal: negative Abdominal pain: no  Breast: breast dermitits  Musculoskeletal: negative Joint pain No Back pain: no  Psychiatric: negative  Hematologic/Lymphatic/Immunologic: negative  Endocrine: negative    EXAM  There were no vitals taken for this visit.   PHYSICAL EXAM  Constitutional - well appearing in no distress  Eyes - no redness or discharge  Respiratory -  No cough, labored breathing  Skin - No jaundice  Neurological - no tremors,  Psychiatric - no anxiety, alert & oriented  The rest of a comprehensive physical examination is deferred due to PHE (public health emergency) video visit restrictions.    ASSESSMENT/PLAN:    Hakeem Meade is a 30 year old woman with history of bilateral breast rash most consistent with contact dermatitis. Now resolved after using Desonide cream for 3-4 days. She was strongly encouraged to follow up with an recurrent or new breast concerns.     Screening recommendations  Clinic encounter every 1-3 years. Be familiar with your breast and how they normally feel and appear. Promptly report any changes to your healthcare provider. Consider beginning screening mammograms at age 40.         Vanesa Gaviria PA-C

## 2021-01-13 ENCOUNTER — VIRTUAL VISIT (OUTPATIENT)
Dept: SURGERY | Facility: CLINIC | Age: 31
End: 2021-01-13
Attending: PHYSICIAN ASSISTANT
Payer: COMMERCIAL

## 2021-01-13 DIAGNOSIS — L30.9 DERMATITIS: Primary | ICD-10-CM

## 2021-01-13 PROCEDURE — 999N001193 HC VIDEO/TELEPHONE VISIT; NO CHARGE

## 2021-01-13 PROCEDURE — 99213 OFFICE O/P EST LOW 20 MIN: CPT | Mod: 95 | Performed by: PHYSICIAN ASSISTANT

## 2021-01-13 NOTE — LETTER
"    1/13/2021         RE: Hakeem Meade  [address] [place] [address]        Dear Colleague,    Thank you for referring your patient, Hakeem Meade, to the [facility]. Please see a copy of my visit note below.      Hakeem is a 30 year old who is being evaluated via a billable video visit.      How would you like to obtain your AVS? MyChart  If the video visit is dropped, the invitation should be resent by: Text to cell phone: [phone]  Will anyone else be joining your video visit? No      Vitals - Patient Reported  Weight (Patient Reported): 53.5 kg (118 lb)  Height (Patient Reported): 162.6 cm (5' 4\")  BMI (Based on Pt Reported Ht/Wt): 20.25  Pain Score: No Pain (0)      I have reviewed and updated patient's allergy and medication list.    Concerns: NONE  Refills: NONE      Ranjana Camarillo [facility] [address]    Video Start Time: 8:52 AM  Video-Visit Details    Type of service:  Video Visit    Video End Time:9:08 AM    Originating Location (pt. Location): Home    Distant Location (provider location):  [facility]     Platform used for Video Visit: [facility]      FOLLOW UP  Jan 13, 2021     Hakeem Meade is a 30 year old woman who presents for follow up of bilateral breast dermatitis.     HPI:    She was last seen by myself in the [facility] 12/23 for diffuse bilateral breast itching and redness that appears consistent with contact dermatitis. She refused recommended breast imaging at that visit. She was started on topical desonide lotion and topical moisturizers.     She has since seen Dermatology who recommended continuing topical Desonide as needed..     She is currently breast feeding for 8 month old daughter. She used the Desonide topical steroid for 3-4 days and the redness and itching resolved. She is no longer using Desonide. She denies any skin redness, skin changes, breast mass, nipple inversion or nipple discharge. She is using a topical moisturizer daily " and has stopped using breast pads.     BREAST-SPECIFIC HISTORY:     Previous breast imaging: No     Prior breast biopsies/surgeries: No     Prior history of breast cancer: No  Prior radiation history: No  Self breast exams: Yes  Breast density:      GYN HISTORY:  . Age at 1st pregnancy: 29. Breastfeeding history: Yes.   Age at menarche: [age]  Menopausal: premenopausal  Menopausal hormone replacement therapy: no     RISK ASSESSMENT: < 20% lifetime risk      FAMILY HISTORY:  Breast ca: No  Ovarian ca: No  Pancreatic ca: No  Prostate: No  Gastric ca: No  Melanoma: No  Colon ca: No  Other cancer: No  Other genetic, testing, syndromes, or clotting disorders: no      PAST MEDICAL HISTORY  Past Medical History:   Diagnosis Date     NO ACTIVE PROBLEMS      PAST SURGICAL HISTORY   Past Surgical History:   Procedure Laterality Date     C EACH ADD TOOTH EXTRACTION       DILATION AND EVACUATION N/A 2019    Procedure: DILATION AND EVACUATION;  Surgeon: Meghana Hoyos MD;  Location: [facility] OR     FOOT SURGERY       MEDICATIONS  Current Outpatient Medications   Medication Sig Dispense Refill     Prenatal Vit-Fe Fumarate-FA (PRENATAL MULTIVITAMIN PLUS IRON) 27-0.8 MG TABS per tablet Take 1 tablet by mouth daily       triamcinolone (KENALOG) 0.1 % external cream Apply topically 2 times daily Apply to aa on hands, arms, trunk and legs bid for 14 days 454 g 0     augmented betamethasone dipropionate (DIPROLENE-AF) 0.05 % external ointment Apply topically 2 times daily (Patient not taking: Reported on 2021) 50 g 1     desonide (DESOWEN) 0.05 % external lotion Apply topically 2 times daily (Patient not taking: Reported on 2021) 59 mL 0     docusate sodium (COLACE) 100 MG capsule Take 1 capsule (100 mg) by mouth 2 times daily as needed for constipation (Patient not taking: Reported on 10/2/2020) 100 capsule 3     LECITHIN PO        ALLERGIES  Allergies   Allergen Reactions     Cefradine [Cephradine]       Redness at injection site      SOCIAL HISTORY:  Smokes: No  EtOH: No  Works as an analyst    ROS:  Change in vision No  Headaches: no  Respiratory: No shortness of breath, dyspnea on exertion, cough, or hemoptysis   Cardiovascular: negative   Gastrointestinal: negative Abdominal pain: no  Breast: breast dermitits  Musculoskeletal: negative Joint pain No Back pain: no  Psychiatric: negative  Hematologic/Lymphatic/Immunologic: negative  Endocrine: negative    EXAM  There were no vitals taken for this visit.   PHYSICAL EXAM  Constitutional - well appearing in no distress  Eyes - no redness or discharge  Respiratory -  No cough, labored breathing  Skin - No jaundice  Neurological - no tremors,  Psychiatric - no anxiety, alert & oriented  The rest of a comprehensive physical examination is deferred due to PHE (public health emergency) video visit restrictions.    ASSESSMENT/PLAN:    Hakeem Meade is a 30 year old woman with history of bilateral breast rash most consistent with contact dermatitis. Now resolved after using Desonide cream for 3-4 days. She was strongly encouraged to follow up with an recurrent or new breast concerns.     Screening recommendations  Clinic encounter every 1-3 years. Be familiar with your breast and how they normally feel and appear. Promptly report any changes to your healthcare provider. Consider beginning screening mammograms at age 40.         Vanesa Gaviria PA-C

## 2021-02-09 NOTE — PROGRESS NOTES
SUBJECTIVE:   CC: Hakeem Meade is an 30 year old woman who presents for preventive health visit.     Patient has been advised of split billing requirements and indicates understanding: Yes       Healthy Habits:    Do you get at least three servings of calcium containing foods daily (dairy, green leafy vegetables, etc.)? yes    Amount of exercise or daily activities, outside of work: just taking care of her baby     Problems taking medications regularly No    Medication side effects: No    Have you had an eye exam in the past two years? no    Do you see a dentist twice per year? yes    Do you have sleep apnea, excessive snoring or daytime drowsiness?no    Hakeem's questions  - weather too dry, what to do with nose and skin? Eczema was doing ok with cream from derm until this very cold weather and now it is bad again.  She did just get a stronger cream from derm as well.  They are using humidifiers.  - do not have period yet since birth and wondering if it is normal?  - seems to urinate more slowly since giving birth and feels unfinished with urinating afterwards.  No dysuria or frequency.  No feeling of fullness in the vagina.     Annual biometric screening, a form need to fill .    Continues to work remotely for [employer] - not sure when they will return    Today's PHQ-2 Score:   PHQ-2 ( 1999 [employer]) 10/23/2020 6/22/2020   Q1: Little interest or pleasure in doing things 0 0   Q2: Feeling down, depressed or hopeless 0 0   PHQ-2 Score 0 0       Abuse: Current or Past(Physical, Sexual or Emotional)- No  Do you feel safe in your environment? Yes        Social History     Tobacco Use     Smoking status: Never Smoker     Smokeless tobacco: Never Used   Substance Use Topics     Alcohol use: No     If you drink alcohol do you typically have >3 drinks per day or >7 drinks per week? No                     Reviewed orders with patient.  Reviewed health maintenance and updated orders accordingly - Yes  Lab work is in  "process  Labs reviewed in [facility]    Breast CA Risk Screening:    Patient under 40 years of age: Routine Mammogram Screening not recommended.   Pertinent mammograms are reviewed under the imaging tab.    Pertinent mammograms are reviewed under the imaging tab.  History of abnormal Pap smear: NO - age 30-65 PAP every 5 years with negative HPV co-testing recommended  PAP / HPV 4/13/2018   PAP wnl   in [facility]     Reviewed and updated as needed this visit by clinical staff  Tobacco  Allergies  Meds  Problems  Med Hx  Surg Hx  Fam Hx          Reviewed and updated as needed this visit by Provider                    ROS:  CONSTITUTIONAL: NEGATIVE for fever, chills, change in weight  INTEGUMENTARU/SKIN: NEGATIVE for worrisome rashes, moles or lesions  EYES: NEGATIVE for vision changes or irritation  ENT: NEGATIVE for ear, mouth and throat problems  RESP: NEGATIVE for significant cough or SOB  BREAST: NEGATIVE for masses, tenderness or discharge; breastfeeding  CV: NEGATIVE for chest pain, palpitations or peripheral edema  GI: NEGATIVE for nausea, abdominal pain, heartburn, or change in bowel habits  : NEGATIVE for unusual urinary or vaginal symptoms. Periods are absent as noted above  MUSCULOSKELETAL: NEGATIVE for significant arthralgias or myalgia; some back aches since having Colette  NEURO: NEGATIVE for weakness, dizziness or paresthesias  PSYCHIATRIC: NEGATIVE for changes in mood or affect    OBJECTIVE:   /70   Pulse 94   Temp 98.9  F (37.2  C) (Temporal)   Resp 14   Ht 1.626 m (5' 4.02\")   Wt 51.9 kg (114 lb 5 oz)   SpO2 95%   BMI 19.61 kg/m    Waist Circumference : 64.5cm   EXAM:  GENERAL: healthy, alert and no distress  EYES: Eyes grossly normal to inspection, PERRL and conjunctivae and sclerae normal  HENT: ear canals and TM's normal, nose and mouth without ulcers or lesions  NECK: no adenopathy, no asymmetry, masses, or scars and thyroid normal to palpation  RESP: lungs clear to " "auscultation - no rales, rhonchi or wheezes  BREAST: normal without masses, tenderness or nipple discharge and no palpable axillary masses or adenopathy  CV: regular rate and rhythm, normal S1 S2, no S3 or S4, no murmur, click or rub, no peripheral edema and peripheral pulses strong  ABDOMEN: soft, nontender, no hepatosplenomegaly, no masses and bowel sounds normal   (female): normal female external genitalia, normal urethral meatus, vaginal mucosa pink, moist, well rugated, and normal cervix/adnexa/uterus without masses or discharge  MS: no gross musculoskeletal defects noted, no edema  SKIN: no suspicious lesions or rashes  NEURO: Normal strength and tone, mentation intact and speech normal  PSYCH: mentation appears normal, affect normal/bright  LYMPH: no cervical, supraclavicular, axillary, or inguinal adenopathy    Diagnostic Test Results:  Labs reviewed in [facility]  Labs pending    ASSESSMENT/PLAN:   (Z13.220) Lipid screening  (primary encounter diagnosis)  Comment:   Plan: Lipid panel reflex to direct LDL Fasting            (Z00.00) Routine general medical examination at a health care facility  Comment:   Plan:     (Z12.4) Screening for cervical cancer  Comment:   Plan: Pap imaged thin layer screen with HPV -         recommended age 30 - 65, HPV High Risk Types         DNA Cervical            (Z13.1) Screening for diabetes mellitus  Comment:   Plan: **Glucose FUTURE anytime, **A1C FUTURE anytime              Patient has been advised of split billing requirements and indicates understanding: Yes  COUNSELING:   Reviewed preventive health counseling, as reflected in patient instructions    Estimated body mass index is 19.61 kg/m  as calculated from the following:    Height as of this encounter: 1.626 m (5' 4.02\").    Weight as of this encounter: 51.9 kg (114 lb 5 oz).        She reports that she has never smoked. She has never used smokeless tobacco.      Counseling Resources:  ATP IV Guidelines  Pooled Cohorts " Equation Calculator  Breast Cancer Risk Calculator  BRCA-Related Cancer Risk Assessment: FHS-7 Tool  FRAX Risk Assessment  ICSI Preventive Guidelines  Dietary Guidelines for Americans, 2010  USDA's MyPlate  ASA Prophylaxis  Lung CA Screening    MAMADOU Horvath CNP  [facility]

## 2021-02-09 NOTE — PATIENT INSTRUCTIONS
"Vitamin D 6066-1048 international unit(s)   If you get your period back, start a calcium and magnesium supplement to help supply (calcium 500 mg/magnesium 250 mg)    Return for \"lab-only visit\" for cholesterol and diabetes check - come fasting 9 hours    Recommendations for a healthy life:  1.\"Eat food.  Not too much.  Mostly plants\" - Oli Pollen - see more below  - Aim for 5-7 servings of vegetables (raw vegetables - 1 serving = 1/2 cup; raw greens - 1 serving = 1 cup)   - Eat grains, but don't make them the superstar of your meal and DO make them whole grains  2. AVOID sugar.  There is no nutritional benefit to sugar.  3. Drink lots of water (avoid juice and soda)  4. MOVE your body daily - even if some days this means 5 minutes of movement. Walking is great for your bones and brain.  Do aim for 150 minutes per week of activity that raises your heart rate, but \"don't let the ideal get in the way of the good enough\"  5. Get good sleep (6-8 hours/night- less sleep is associated with disease/illness/obesity)   6. Smile and laugh every day - even in the face of tragedy  7. Start a meditation or mindfulness practice.    CALCIUM: The average recommended intake for women is 2183-9420 mg calcium daily. It is best to get this from your diet (Milk, yogurt, and cheese, vegetables such as Chinese cabbage, kale, spinach and broccoli). If you are not eating enough calcium, then I recommend Calcium Citrate/vitD supplements daily.     Vitamin D is an essential nutritient that many [place] lack, especially in the winter. It is vital for healthy immune system functioning and can be linked to diseases such as obesity, diabetes, body aches/pain, etc. It is super safe and highly beneficial for a [place] to take a vitamin D3 2000 IU once daily during winter months. Daily vitamin D for life is recommended for anyone who has ever been found deficient.    Other things to consider: do not drive while under the influence of " alcohol, do not drive while texting, always wear a seatbelt, wear a helmet when biking, wear sunscreen to help prevent skin cancer, use DEET mosquito spray when outdoors to prevent mosquito and tick bites, & avoid smoking. If you do get bit by a DEER tick, please contact my office immediately to consider lyme prophylaxis. Dental visits recommended every 6-12 months.    Oli Claire's 7 Rules for Eating  https://[email or web address]/food-recipes/news/93090416/7-rules-for-eating#1      Preventive Health Recommendations  Female Ages 26 - 39  Yearly exam:   See your health care provider every year in order to    Review health changes.     Discuss preventive care.      Review your medicines if you your doctor has prescribed any.    Until age 30: Get a Pap test every three years (more often if you have had an abnormal result).    After age 30: Talk to your doctor about whether you should have a Pap test every 3 years or have a Pap test with HPV screening every 5 years.   You do not need a Pap test if your uterus was removed (hysterectomy) and you have not had cancer.  You should be tested each year for STDs (sexually transmitted diseases), if you're at risk.   Talk to your provider about how often to have your cholesterol checked.  If you are at risk for diabetes, you should have a diabetes test (fasting glucose).  Shots: Get a flu shot each year. Get a tetanus shot every 10 years.   Nutrition:     Eat at least 5 servings of fruits and vegetables each day.    Eat whole-grain bread, whole-wheat pasta and brown rice instead of white grains and rice.    Get adequate Calcium and Vitamin D.     Lifestyle    Exercise at least 150 minutes a week (30 minutes a day, 5 days of the week). This will help you control your weight and prevent disease.    Limit alcohol to one drink per day.    No smoking.     Wear sunscreen to prevent skin cancer.    See your dentist every six months for an exam and cleaning.

## 2021-02-12 ENCOUNTER — MYC MEDICAL ADVICE (OUTPATIENT)
Dept: FAMILY MEDICINE | Facility: CLINIC | Age: 31
End: 2021-02-12

## 2021-02-12 ENCOUNTER — OFFICE VISIT (OUTPATIENT)
Dept: FAMILY MEDICINE | Facility: CLINIC | Age: 31
End: 2021-02-12
Payer: COMMERCIAL

## 2021-02-12 VITALS
RESPIRATION RATE: 14 BRPM | TEMPERATURE: 98.9 F | WEIGHT: 114.31 LBS | DIASTOLIC BLOOD PRESSURE: 70 MMHG | HEIGHT: 64 IN | BODY MASS INDEX: 19.52 KG/M2 | OXYGEN SATURATION: 95 % | HEART RATE: 94 BPM | SYSTOLIC BLOOD PRESSURE: 104 MMHG

## 2021-02-12 DIAGNOSIS — Z00.00 ROUTINE GENERAL MEDICAL EXAMINATION AT A HEALTH CARE FACILITY: ICD-10-CM

## 2021-02-12 DIAGNOSIS — Z12.4 SCREENING FOR CERVICAL CANCER: ICD-10-CM

## 2021-02-12 DIAGNOSIS — Z13.220 LIPID SCREENING: Primary | ICD-10-CM

## 2021-02-12 DIAGNOSIS — Z13.1 SCREENING FOR DIABETES MELLITUS: ICD-10-CM

## 2021-02-12 PROCEDURE — 99395 PREV VISIT EST AGE 18-39: CPT | Performed by: NURSE PRACTITIONER

## 2021-02-12 PROCEDURE — 87624 HPV HI-RISK TYP POOLED RSLT: CPT | Performed by: NURSE PRACTITIONER

## 2021-02-12 ASSESSMENT — MIFFLIN-ST. JEOR: SCORE: 1213.77

## 2021-02-15 PROCEDURE — G0145 SCR C/V CYTO,THINLAYER,RESCR: HCPCS | Performed by: NURSE PRACTITIONER

## 2021-02-15 NOTE — TELEPHONE ENCOUNTER
Pt was seen in the clinic on 2/12, pt's questions were addressed during her visit.    Halina Virgen RN  [facility]

## 2021-02-18 LAB
COPATH REPORT: NORMAL
PAP: NORMAL

## 2021-02-19 LAB
FINAL DIAGNOSIS: NORMAL
HPV HR 12 DNA CVX QL NAA+PROBE: NEGATIVE
HPV16 DNA SPEC QL NAA+PROBE: NEGATIVE
HPV18 DNA SPEC QL NAA+PROBE: NEGATIVE
SPECIMEN DESCRIPTION: NORMAL
SPECIMEN SOURCE CVX/VAG CYTO: NORMAL

## 2021-03-31 ENCOUNTER — VIRTUAL VISIT (OUTPATIENT)
Dept: DERMATOLOGY | Facility: CLINIC | Age: 31
End: 2021-03-31
Payer: COMMERCIAL

## 2021-03-31 DIAGNOSIS — L20.89 OTHER ATOPIC DERMATITIS: Primary | ICD-10-CM

## 2021-03-31 PROCEDURE — 99213 OFFICE O/P EST LOW 20 MIN: CPT | Mod: GQ | Performed by: DERMATOLOGY

## 2021-03-31 RX ORDER — TACROLIMUS 1 MG/G
OINTMENT TOPICAL
Qty: 60 G | Refills: 11 | Status: SHIPPED | OUTPATIENT
Start: 2021-03-31 | End: 2021-08-30

## 2021-03-31 ASSESSMENT — PAIN SCALES - GENERAL: PAINLEVEL: NO PAIN (0)

## 2021-03-31 NOTE — NURSING NOTE
Chief Complaint   Patient presents with     Derm Problem     Yuan has a telephone visit to discuss dermatitis.      Cherri Eastman LPN

## 2021-03-31 NOTE — LETTER
3/31/2021       RE: Hakeem Meade  [address] [place] [address]     Dear Colleague,    Thank you for referring your patient, Hakeem Meade, to the [facility] [place] at [facility]. Please see a copy of my visit note below.    [facility] Dermatology Note  Encounter Date: Mar 31, 2021  [employer] and Telephone ([phone] ). Location of teledermatologist: [facility] [place].  Start time: 1:02 PM. End time: 1:23 PM.    Dermatology Problem List:  1. Atopic dermatitis: Desonide lotion to breast, TAC ointment on trunk/extremities  2. Irritant contact hand dermatitis: Augmented betamethasone, protopic 0.1% ointment  3. Post-inflammatory hyperpigmetation  ____________________________________________    Assessment & Plan:     # Chronic hand dermatitis. Ddx irritant/ACD versus dyshidrotic eczema. Etiology and chronic nature discussed. Discussed additional more aggressive therapies such as nbUVB, dupixent as well as further diagnostic work-up, including patch testing to r/o ACD. We also discussed alternative topical therapies including topical calcineurin inhibitors which may be safer for long-term use. Patient opted for trial of calcineurin inhibitor. Will research other potential options and contact us if she would like to pursue any of these.   - Start protopic 0.1% ointment BID  - Can use augmented betamethasone 0.05% ointment BID PRN flares  - Consider nbUVB phototherapy, dupixent, versus referral for patch testing if flaring; patient to contact clinic if she would like discuss these again    Procedures Performed:    None    Follow-up: prn for new or changing lesions    Staff:     Shmuel Huang MD  Pronouns: he/him/his    Department of Dermatology  [facility]: Phone: [phone],  "Fax:[phone]  [facility]: Phone: [phone] Fax: [phone]    ____________________________________________    CC: Derm Problem (Hakeem has a telephone visit to discuss dermatitis. )    HPI:  Ms. Hakeem Meade is a(n) 30 year old female who presents today as a return patient for hand dermatitis and nipple dermatitis / atopic dermatitis. Last seen on 12/29/2020 when started on desonide 0.05% ointment BID PRN for the nipple area, triamcinolone 0.1% ointment for the trunk/extremities, and augmented betamethasone 0.05% ointment for the hands.     Today, she reports the rash on her nipple area/trunk has resolved. She unfortunately continues to have intermittent rash on the hands. She states this is improved from prior, but will have an outbreak every 1-2 weeks. This typically responds to betamethasone 0.05% ointment. Patient does report numerous potential triggers. [place] she did have a new bab in the last year and does report numerous new topical exposures. She otherwise is using a gentle soap and CeraVe lotion. She would be interested in discussing options to \"cure\" things.     Patient is otherwise feeling well, without additional skin concerns.    Labs Reviewed:  N/A    Physical Exam:  Vitals: There were no vitals taken for this visit.  SKIN: Teledermatology photos were reviewed; image quality and interpretability: acceptable. Image date: 3/31/21.  - Pink, eczematous scaly papules with mild desquamative scale on fingers and dorsal hands.   - No other lesions of concern on areas examined.              Medications:  Current Outpatient Medications   Medication     docusate sodium (COLACE) 100 MG capsule     LECITHIN PO     Prenatal Vit-Fe Fumarate-FA (PRENATAL MULTIVITAMIN PLUS IRON) 27-0.8 MG TABS per tablet     triamcinolone (KENALOG) 0.1 % external cream     augmented betamethasone dipropionate (DIPROLENE-AF) 0.05 % " external ointment     desonide (DESOWEN) 0.05 % external lotion     No current facility-administered medications for this visit.      Facility-Administered Medications Ordered in Other Visits   Medication     lidocaine-EPINEPHrine 1.5 %-1:[id number] injection      Past Medical/Surgical History:   Patient Active Problem List   Diagnosis     history of cystic hygroma     History of termination of pregnancy -  Increased risk of Monosomy X (Christine Syndrome) based on cell-free DNA screen      Family history of heart block      (spontaneous vaginal delivery)     Past Medical History:   Diagnosis Date     NO ACTIVE PROBLEMS

## 2021-03-31 NOTE — PATIENT INSTRUCTIONS
[facility] Dermatology Visit    Thank you for allowing us to participate in your care. Your findings, instructions and follow-up plan are as follows:    Hand dermatitis.   1. Can start using protopic 0.1% ointment twice daily. Can switch to augmented betamethasone (stronger steroid ointment) if flaring.     Other options to consider:    1. nbUVB phototherapy for the hands.   2. Dupixent (injectable medication)  3. Patch testing (allergic contact dermatitis testing) - for further work-up on etiology.     When should I call my doctor?    If you are worsening or not improving, please, contact us or seek urgent care as noted below.     Who should I call with questions (adults)?    [facility] (adult and pediatric): [phone]     [facility] (adult): [phone]    For urgent needs outside of business hours call the [facility] at [phone] and ask for the dermatology resident on call    If this is a medical emergency and you are unable to reach an ER, Call [phone]      Who should I call with questions (pediatric)?  [facility]- Pediatric Dermatology  Dr. Preethi Boyd, Dr. Rojas Beltran, Dr. Alison Neri, Amalia Thompson, PA  Dr. Kat Restrepo, Dr. Shannan Casas & Dr. Art Tovar  Non Urgent  Nurse Triage Line; [phone]- [place] and Tiffany OTOOLE Care Coordinators   Ragini (/Complex ) [phone]    If you need a prescription refill, please contact your pharmacy. Refills are approved or denied by our Physicians during normal business hours, Monday through Fridays  Per office policy, refills will not be granted if you have not been seen within the past year (or sooner depending on your child's condition)    Scheduling Information:  Pediatric Appointment Scheduling and Call Center [phone]  Radiology Scheduling- [phone]  Sedation Unit Scheduling-  [phone]  [facility] [phone]; Pediatric Dermatology [phone]  Main  Services: [phone]  Yakut: [phone]  Syrian: [phone]  Hmong/Malawian/Hungarian: [phone]  Preadmission Nursing Department Fax Number: [phone] (Fax all pre-operative paperwork to this number)    For urgent matters arising during evenings, weekends, or holidays that cannot wait for normal business hours please call [phone] and ask for the Dermatology Resident On-Call to be paged.

## 2021-04-12 ENCOUNTER — IMMUNIZATION (OUTPATIENT)
Dept: NURSING | Facility: CLINIC | Age: 31
End: 2021-04-12
Payer: COMMERCIAL

## 2021-04-12 PROCEDURE — 91300 PR COVID VAC PFIZER DIL RECON 30 MCG/0.3 ML IM: CPT

## 2021-04-12 PROCEDURE — 0001A PR COVID VAC PFIZER DIL RECON 30 MCG/0.3 ML IM: CPT

## 2021-04-19 ENCOUNTER — TELEPHONE (OUTPATIENT)
Dept: FAMILY MEDICINE | Facility: CLINIC | Age: 31
End: 2021-04-19

## 2021-04-19 NOTE — TELEPHONE ENCOUNTER
Call received from patient, requesting clinic address and asked if clinic lab is open on weekends as she would like to complete lab work ordered by MICHAEL Ribera CNP.    Reviewed [place] location address, informed patient lab is open on weekends and assisted patient to schedule a lab visit on 5/1/21.  Patient then requested lab appt be cancelled and asked to be transferred back to Central Scheduling to schedule at a lab closer to her home.    Patient's call transferred to Central Scheduling and 5/1/21 lab appt cancelled.    ALISON TinocoN, RN  [facility]

## 2021-04-24 DIAGNOSIS — Z13.1 SCREENING FOR DIABETES MELLITUS: ICD-10-CM

## 2021-04-24 DIAGNOSIS — Z13.220 LIPID SCREENING: ICD-10-CM

## 2021-04-24 LAB
CHOLEST SERPL-MCNC: 210 MG/DL
GLUCOSE SERPL-MCNC: 87 MG/DL (ref 70–99)
HBA1C MFR BLD: 5.6 % (ref 0–5.6)
HDLC SERPL-MCNC: 85 MG/DL
LDLC SERPL CALC-MCNC: 117 MG/DL
NONHDLC SERPL-MCNC: 125 MG/DL
TRIGL SERPL-MCNC: 40 MG/DL

## 2021-04-24 PROCEDURE — 36415 COLL VENOUS BLD VENIPUNCTURE: CPT | Performed by: NURSE PRACTITIONER

## 2021-04-24 PROCEDURE — 83036 HEMOGLOBIN GLYCOSYLATED A1C: CPT | Performed by: NURSE PRACTITIONER

## 2021-04-24 PROCEDURE — 80061 LIPID PANEL: CPT | Performed by: NURSE PRACTITIONER

## 2021-04-24 PROCEDURE — 82947 ASSAY GLUCOSE BLOOD QUANT: CPT | Performed by: NURSE PRACTITIONER

## 2021-04-26 ENCOUNTER — MYC MEDICAL ADVICE (OUTPATIENT)
Dept: FAMILY MEDICINE | Facility: CLINIC | Age: 31
End: 2021-04-26

## 2021-05-03 ENCOUNTER — IMMUNIZATION (OUTPATIENT)
Dept: NURSING | Facility: CLINIC | Age: 31
End: 2021-05-03
Attending: INTERNAL MEDICINE
Payer: COMMERCIAL

## 2021-05-03 PROCEDURE — 0002A PR COVID VAC PFIZER DIL RECON 30 MCG/0.3 ML IM: CPT

## 2021-05-03 PROCEDURE — 91300 PR COVID VAC PFIZER DIL RECON 30 MCG/0.3 ML IM: CPT

## 2021-05-07 NOTE — RESULT ENCOUNTER NOTE
"Yuan,    Your labs look really good overall.  You do not have diabetes - both glucose and A1c were normal.  Two really good parts of cholesterol are low triglycerides and high HDL.  The HDL is the \"good\" and protective cholesterol.  Your LDL and total are very mildly elevated and really not so much for your young age.  Overall, I would say that just to focus on a healthy diet and exercise.  I'll put more information about nutrition below.   If you have any questions, please feel free to contact the clinic.    SUSAN Boyle    The big take-aways include: (with more in-depth explanation below, if interested)   1.  Increase vegetables to 1/2 the bulk of each meal with a goal of 5-7 servings a day   2.  Make almost all of your carbohydrates high quality whole grains   3.  Use legumes as triple duty for plant matter, protein and carbohydrates!   4.  Add more fish and/or fish oil   5.  Reduce sweets, sugar, junk food to rare intake     If there are changes you can make and you want to, we can recheck again in 3-6 months.       Nutrition is a field that is rife with controversy and a range of recommendations, some which are contradictory.  It's likely that there is no one diet that works for every body or for every goal.  That said, there is agreement on plant matter (vegetables).  I often recommend for everyone to have 5-7 servings of vegetables a day and/or that vegetables/plant matter make up 1/2 of the bulk of each meal.  This is higher than the typical recommendation.  The extra vegetables take the place of some carbohydrates and protein.       Make almost all of your carbohydrates high quality whole grains.  Whole grains are not necessarily called \"whole wheat bread\" or \"whole wheat pasta\" because these can be just white grain with coloring. Think about looking for a diverse set of grains to include in your diet like brown rice, quinoa, farrow, barley, etc. And make them 1/4-1/3 of the bulk of your meals. "  Legumes are a power-house food providing fiber, high quality carbohydrates and protein.  Look for ways to include more legumes like lentils and beans.     With proteins, I still unconvinced about one route for all people.  The mediterranean diet has good data showing improved cardiovascular outcomes and emphasizes low meat and saturated fat content.  However, some people seem to have better satiety with some amount of saturated fat.  So I would recommend inclusion of a small amount of fat whether through meat or butter or milk, but largely focus on lean meats and fish.   Speaking of fish - jayy 3 fatty acids largely help triglycerides (which you don't need help with - yours are great), but may also help lower LDL.  The data is not clear on this.     There is really no place at all for sugar or simple carbs, except that we are humans who like to enjoy life from time-to-time.  If you already enjoy these rarely, no need to make a big change.  If these foods are an achilles heel for you, consider a 2-6 week period where you have none to reset your baseline.     If you have any questions, please let me know.  We can also always have you see nutrition if you are interested.

## 2021-05-10 NOTE — TELEPHONE ENCOUNTER
Katie,    See my chart message    Thanks  Nereida Arredondo RN   [facility]

## 2021-06-01 ENCOUNTER — MYC MEDICAL ADVICE (OUTPATIENT)
Dept: FAMILY MEDICINE | Facility: CLINIC | Age: 31
End: 2021-06-01

## 2021-06-02 NOTE — TELEPHONE ENCOUNTER
Katie    See pt MyChart message    Do you want her to have in clinic appointment or virtual ok    Debbie Koch RN   [facility]

## 2021-06-03 NOTE — TELEPHONE ENCOUNTER
Left vm for patient to return call to clinic and per provider message below schedule virtual for next Tuesday    Neredia Arredondo RN   [facility]

## 2021-06-08 ENCOUNTER — VIRTUAL VISIT (OUTPATIENT)
Dept: FAMILY MEDICINE | Facility: CLINIC | Age: 31
End: 2021-06-08
Payer: COMMERCIAL

## 2021-06-08 DIAGNOSIS — M54.50 ACUTE MIDLINE LOW BACK PAIN WITHOUT SCIATICA: Primary | ICD-10-CM

## 2021-06-08 PROCEDURE — 99215 OFFICE O/P EST HI 40 MIN: CPT | Mod: 95 | Performed by: NURSE PRACTITIONER

## 2021-06-08 NOTE — PATIENT INSTRUCTIONS
Schedule with Dr. Piña or Dr. Graff for Colette's 15 month visit in August    Schedule with physical therapy  - ask them to send their note high priority  Physical Therapy, Hand Therapy and Chiropractic Care are available through:   *[facility]   *Hand Therapy (Occupational Therapy or Physical Therapy)   *[facility]      Call one number to schedule at any of the above locations: [phone].     Ice 15-20 minutes at a time as many times a day as possible  Ibuprofen 600 mg every 6 hours around clock for the next 3-4 days - take with food  Stretches - as below  Ergonomics - squat, kneel when bending, or kick our other leg  No heavy lifting  Walk frequently  Pillow under legs when laying flat and between knees when laying on your side    Back exercise videos  1) KNEE TO CHEST  https://www.[employer].com/watch?v=LFmluFxl2lf  https://www.youCoupangube.com/watch?v=OynF7YtqFx1  2) FIGURE 4 STRETCH SITTING  https://www.youCoupangube.com/watch?v=7E8WYM1yGd8  3) PRONE PRESS - should be very small movements and low back should be relaxed - all power comes from the arms, initially only lift an inch; there should not be leg pain - if there is stop  https://www.[employer].com/watch?v=kQ8cWWTHDaI  4) FOAM ROLLER IT BAND  https://www.youCoupangube.com/watch?v=Sl2ZQZ0OtIl

## 2021-06-08 NOTE — PROGRESS NOTES
Hakeem is a 30 year old who is being evaluated via a billable video visit.      How would you like to obtain your AVS? Ugohart  If the video visit is dropped, the invitation should be resent by: Text to cell phone: [phone]  Will anyone else be joining your video visit? No    Video Start Time: 5:06 PM    Assessment & Plan     Acute midline low back pain without sciatica  - [facility]; Future    61 minutes spent on the date of the encounter doing chart review, history and exam, documentation and further activities per the note     Patient Instructions     Schedule with Dr. Piña or Dr. Graff for Colette's 15 month visit in August    Schedule with physical therapy  - ask them to send their note high priority  Physical Therapy, Hand Therapy and Chiropractic Care are available through:   *[facility]   *Hand Therapy (Occupational Therapy or Physical Therapy)   *[facility]      Call one number to schedule at any of the above locations: [phone].     Ice 15-20 minutes at a time as many times a day as possible  Ibuprofen 600 mg every 6 hours around clock for the next 3-4 days - take with food  Stretches - as below  Ergonomics - squat, kneel when bending, or kick our other leg  No heavy lifting  Walk frequently  Pillow under legs when laying flat and between knees when laying on your side    Back exercise videos  1) KNEE TO CHEST  https://www.[employer].com/watch?v=XAsrhJdq4su  https://www.youOneTrueFanube.com/watch?v=PrxO2TyoIq0  2) FIGURE 4 STRETCH SITTING  https://www.youtube.com/watch?v=0J9DWK9sZr0  3) PRONE PRESS - should be very small movements and low back should be relaxed - all power comes from the arms, initially only lift an inch; there should not be leg pain - if there is stop  https://www.youOneTrueFanube.com/watch?v=iA7tRDKXCaO  4) FOAM ROLLER IT BAND  https://www.[employer].com/watch?v=Pq4KIC9GiFg      Return in about 8 months (around 2/8/2022) for Physical  Exam.    MAMADOU Horvath CNP  [facility] is a 30 year old who presents for the following health issues accompanied by herself:    HPI     Back Pain  Onset/Duration: on and off for several weeks, not everyday   Description:   Location of pain: low back bilateral  Character of pain: sharp, cramping, pregnant symptoms but not pregnant  Pain radiation: none  New numbness or weakness in legs, not attributed to pain: no   Intensity: mild, moderate  Progression of Symptoms: same and intermittent  History:   Specific cause: not sure what caused it  Pain interferes with job:  no , relieves by itself during the day   History of back problems: during pregnancy only   Any previous MRI or X-rays: None  Sees a specialist for back pain: No  Alleviating factors:   Improved by: sleeping and rest    Precipitating factors:  Worsened by: Bending and Lying Flat, staying up late sitting  Therapies tried and outcome: rest    Accompanying Signs & Symptoms:  Risk of Fracture: None  Risk of Cauda Equina:  Recently had 2-3 days when she had urge to urinate, but could only urinate a small amount.  Also had a day of numbness on the vulva around the urethra.  No similar bowel or rectal symptoms  Risk of Infection: None  Risk of Cancer: Unexplained weight loss - thought related to breastfeeding, but now that is infant is one and weaned it is still dropping past pre-pregnancy  Risk of Ankylosing Spondylitis: Onset at age <35, male; wakes up with pain and it resolves by noon    Pain is midline, not either side.    Started doing walking around in the neighborhood and carries Colette (1 year old).    Has not yet started menstruated since birth.    Very worried it is something else because her dad's back is bad.  She thinks something is too narrow.    Review of Systems   Constitutional, HEENT, cardiovascular, pulmonary, gi and gu systems are negative, except as otherwise noted.      Objective            Vitals:  No vitals were obtained today due to virtual visit.    Physical Exam   GENERAL: Healthy, alert and no distress  EYES: Eyes grossly normal to inspection.  No discharge or erythema, or obvious scleral/conjunctival abnormalities.  RESP: No audible wheeze, cough, or visible cyanosis.  No visible retractions or increased work of breathing.    SKIN: Visible skin clear. No significant rash, abnormal pigmentation or lesions.  NEURO: Cranial nerves grossly intact.  Mentation and speech appropriate for age.  Comprehensive back pain exam:  Range of motion not limited by pain, Lower extremity strength functional and equal on both sides and Lower extremity sensation normal and equal on both sides limited by virtual visit, but patient able to go through these maneuvers  PSYCH: Mentation appears normal, affect normal/bright, judgement and insight intact, normal speech and appearance well-groomed.          Video-Visit Details    Type of service:  Video Visit    Video End Time:5:39 PM    Originating Location (pt. Location): Home    Distant Location (provider location):  [facility]     Platform used for Video Visit: [employer]

## 2021-06-21 ENCOUNTER — MYC MEDICAL ADVICE (OUTPATIENT)
Dept: FAMILY MEDICINE | Facility: CLINIC | Age: 31
End: 2021-06-21

## 2021-06-22 ENCOUNTER — THERAPY VISIT (OUTPATIENT)
Dept: PHYSICAL THERAPY | Facility: CLINIC | Age: 31
End: 2021-06-22
Attending: NURSE PRACTITIONER
Payer: COMMERCIAL

## 2021-06-22 DIAGNOSIS — M54.50 ACUTE MIDLINE LOW BACK PAIN WITHOUT SCIATICA: ICD-10-CM

## 2021-06-22 PROCEDURE — 97112 NEUROMUSCULAR REEDUCATION: CPT | Mod: GP | Performed by: PHYSICAL THERAPIST

## 2021-06-22 PROCEDURE — 97110 THERAPEUTIC EXERCISES: CPT | Mod: GP | Performed by: PHYSICAL THERAPIST

## 2021-06-22 PROCEDURE — 97161 PT EVAL LOW COMPLEX 20 MIN: CPT | Mod: GP | Performed by: PHYSICAL THERAPIST

## 2021-06-22 NOTE — PROGRESS NOTES
Physical Therapy Initial Evaluation  Subjective:  The history is provided by the patient. No  was used.   Therapist Generated HPI Evaluation  Problem details: Pt reports LBP in the AM, pain is reduced by noon.  Pain started in third trimester of her pregnancy, about a year ago.    Pt has experienced LBP in the past especially with bending in the AM, but did not last this long.      Pt reports numbness 1-2x/week in the pelvic area more with sitting and uriniating, but this decreases after a few min with walking    No pain in the legs    Pt reports there is more low back when she has to work late at night. she states her posture is not great at night when she gets tired and then there is more LBP in the AM.    .         Type of problem:  Lumbar.    This is a new condition.  Condition occurred with:  Insidious onset.  Where condition occurred: for unknown reasons.  Patient reports pain:  Lumbar spine left, lumbar spine right and central lumbar spine.  Pain is described as other and aching (soreness ) and is intermittent.  Pain radiates to:  No radiation.   Since onset symptoms are gradually improving.  Associated symptoms:  Numbness, loss of motion/stiffness and loss of motion. Symptoms are exacerbated by bending, certain positions and lying down  and relieved by activity/movement.      Restrictions due to condition include:  Working in normal job without restrictions.                          Objective:  System         Lumbar/SI Evaluation  ROM:    AROM Lumbar:   Flexion:            Wnl  Ext:                    Wnl, mild +   Side Bend:        Left:  Wnl, +    Right:  Wnl, +  Rotation:           Left:  Wnl, -    Right:  Wnl -  Side Glide:        Left:     Right:           Lumbar Myotomes:  normal            Lumbar DTR's:  not assessed      Cord Signs:  not assessed    Lumbar Dermtomes:  normal                Neural Tension/Mobility:    Left side:  SLR positive.  Left side:SLR w/DF or Slump   negative.   Right side:   SLR w/DF; Slump and SLR positive.  Lumbar Palpation:    Tenderness present at Left:    Erector Spinae  Tenderness present at Right: Erector Spinae    Lumbar Provocation:      Left negative with:  Mobility and PROM hip  Right positive with: Mobility  Right negative with:  PROM hip                                                 General     ROS    Assessment/Plan:    Patient is a 30 year old female with lumbar complaints.    Patient has the following significant findings with corresponding treatment plan.                Diagnosis 1:  Low back pain  Pain -  self management and education  Decreased strength - therapeutic exercise and therapeutic activities    Therapy Evaluation Codes:   1) History comprised of:   Personal factors that impact the plan of care:      None.    Comorbidity factors that impact the plan of care are:      None.     Medications impacting care: None.  2) Examination of Body Systems comprised of:   Body structures and functions that impact the plan of care:      Lumbar spine.   Activity limitations that impact the plan of care are:      Bending, Walking, Working and Sleeping.  3) Clinical presentation characteristics are:   Stable/Uncomplicated.  4) Decision-Making    Low complexity using standardized patient assessment instrument and/or measureable assessment of functional outcome.  Cumulative Therapy Evaluation is: Low complexity.    Previous and current functional limitations:  (See Goal Flow Sheet for this information)    Short term and Long term goals: (See Goal Flow Sheet for this information)     Communication ability:  Patient appears to be able to clearly communicate and understand verbal and written communication and follow directions correctly.  Treatment Explanation - The following has been discussed with the patient:   RX ordered/plan of care  Anticipated outcomes  Possible risks and side effects  This patient would benefit from PT intervention to resume  normal activities.   Rehab potential is good.    Frequency:  1 X week, once daily  Duration:  for 6 weeks  Discharge Plan:  Achieve all LTG.  Independent in home treatment program.  Reach maximal therapeutic benefit.    Please refer to the daily flowsheet for treatment today, total treatment time and time spent performing 1:1 timed codes.

## 2021-06-22 NOTE — TELEPHONE ENCOUNTER
Biometric form printed and completed, placed in PCP basket for signature.       Genesis Gonzales RN   [facility]

## 2021-09-01 ENCOUNTER — OFFICE VISIT (OUTPATIENT)
Dept: OPTOMETRY | Facility: CLINIC | Age: 31
End: 2021-09-01
Payer: COMMERCIAL

## 2021-09-01 DIAGNOSIS — H52.13 MYOPIA OF BOTH EYES: ICD-10-CM

## 2021-09-01 DIAGNOSIS — Z01.00 ENCOUNTER FOR EXAMINATION OF EYES AND VISION WITHOUT ABNORMAL FINDINGS: Primary | ICD-10-CM

## 2021-09-01 PROCEDURE — 92004 COMPRE OPH EXAM NEW PT 1/>: CPT | Performed by: OPTOMETRIST

## 2021-09-01 PROCEDURE — 92015 DETERMINE REFRACTIVE STATE: CPT | Performed by: OPTOMETRIST

## 2021-09-01 ASSESSMENT — REFRACTION_MANIFEST
OS_CYLINDER: SPHERE
OS_SPHERE: -6.00
OD_SPHERE: -5.75
OD_CYLINDER: SPHERE

## 2021-09-01 ASSESSMENT — VISUAL ACUITY
OS_CC+: -1
METHOD: SNELLEN - LINEAR
OS_SC: 20/200
OD_SC: 20/400
OD_CC: 20/20
OS_CC: 20/20
CORRECTION_TYPE: GLASSES
OS_CC: 20/20
OD_CC: 20/20
OS_SC: 20/30-1
OD_SC: 20/60

## 2021-09-01 ASSESSMENT — CONF VISUAL FIELD
OD_NORMAL: 1
OS_NORMAL: 1
METHOD: COUNTING FINGERS

## 2021-09-01 ASSESSMENT — SLIT LAMP EXAM - LIDS
COMMENTS: NORMAL
COMMENTS: NORMAL

## 2021-09-01 ASSESSMENT — EXTERNAL EXAM - LEFT EYE: OS_EXAM: NORMAL

## 2021-09-01 ASSESSMENT — TONOMETRY
IOP_METHOD: APPLANATION
OD_IOP_MMHG: 16
OS_IOP_MMHG: 17

## 2021-09-01 ASSESSMENT — REFRACTION_WEARINGRX
OS_CYLINDER: SPHERE
OS_SPHERE: -5.50
SPECS_TYPE: SVL
OD_SPHERE: -5.75
OD_CYLINDER: SPHERE

## 2021-09-01 ASSESSMENT — CUP TO DISC RATIO
OD_RATIO: 0.2
OS_RATIO: 0.2

## 2021-09-01 ASSESSMENT — EXTERNAL EXAM - RIGHT EYE: OD_EXAM: NORMAL

## 2021-09-01 NOTE — PATIENT INSTRUCTIONS
Hakeem was advised of today's exam findings.  Optional to fill new glasses prescription, mild change.  Copy of glasses Rx provided today.    Return in 2 years for eye exam, or sooner if needed.    The effects of the dilating drops last for 4- 6 hours.  You will be more sensitive to light and vision will be blurry up close.  Mydriatic sunglasses were given if needed.      Singh Andujar O.D.  [address] [place]  [address]    [phone]

## 2021-09-01 NOTE — LETTER
9/1/2021         RE: Hakeem Meade  [address] [place] [address]        Dear Colleague,    Thank you for referring your patient, Hakeem Meade, to the [facility]. Please see a copy of my visit note below.    Chief Complaint   Patient presents with     Annual Eye Exam         Last Eye Exam: 3 years ago  Dilated Previously: Yes, side effects of dilation explained today    What are you currently using to see?  glasses     Distance Vision Acuity: Satisfied with vision    Near Vision Acuity: Satisfied with vision while reading and using computer with glasses    Eye Comfort: watery occasionally  Do you use eye drops? : No  Occupation or Hobbies: [employer]     Medical, surgical and family histories reviewed and updated 9/1/2021.       OBJECTIVE: See Ophthalmology exam    ASSESSMENT:    ICD-10-CM    1. Encounter for examination of eyes and vision without abnormal findings  Z01.00    2. Myopia of both eyes  H52.13       PLAN:     Patient Instructions   Hakeem was advised of today's exam findings.  Optional to fill new glasses prescription, mild change.  Copy of glasses Rx provided today.    Return in 2 years for eye exam, or sooner if needed.    The effects of the dilating drops last for 4- 6 hours.  You will be more sensitive to light and vision will be blurry up close.  Mydriatic sunglasses were given if needed.      Singh Andujar O.D.  [address] [place]  [address]    [phone]             Again, thank you for allowing me to participate in the care of your patient.        Sincerely,        Singh Andujar, GISELA

## 2021-09-01 NOTE — PROGRESS NOTES
Chief Complaint   Patient presents with     Annual Eye Exam         Last Eye Exam: 3 years ago  Dilated Previously: Yes, side effects of dilation explained today    What are you currently using to see?  glasses     Distance Vision Acuity: Satisfied with vision    Near Vision Acuity: Satisfied with vision while reading and using computer with glasses    Eye Comfort: watery occasionally  Do you use eye drops? : No  Occupation or Hobbies: [employer]     Medical, surgical and family histories reviewed and updated 9/1/2021.       OBJECTIVE: See Ophthalmology exam    ASSESSMENT:    ICD-10-CM    1. Encounter for examination of eyes and vision without abnormal findings  Z01.00    2. Myopia of both eyes  H52.13       PLAN:     Patient Instructions   Hakeem was advised of today's exam findings.  Optional to fill new glasses prescription, mild change.  Copy of glasses Rx provided today.    Return in 2 years for eye exam, or sooner if needed.    The effects of the dilating drops last for 4- 6 hours.  You will be more sensitive to light and vision will be blurry up close.  Mydriatic sunglasses were given if needed.      Singh Andujar O.D.  [address]  [address]    [phone]

## 2021-10-10 ENCOUNTER — HEALTH MAINTENANCE LETTER (OUTPATIENT)
Age: 31
End: 2021-10-10

## 2021-11-09 ENCOUNTER — IMMUNIZATION (OUTPATIENT)
Dept: FAMILY MEDICINE | Facility: CLINIC | Age: 31
End: 2021-11-09
Payer: COMMERCIAL

## 2021-11-09 DIAGNOSIS — Z23 HIGH PRIORITY FOR 2019-NCOV VACCINE: Primary | ICD-10-CM

## 2021-11-09 PROCEDURE — 99207 PR NO CHARGE LOS: CPT

## 2021-11-09 PROCEDURE — 0064A COVID-19,PF,MODERNA (18+ YRS BOOSTER .25ML): CPT

## 2021-11-09 PROCEDURE — 91306 COVID-19,PF,MODERNA (18+ YRS BOOSTER .25ML): CPT

## 2022-02-27 ENCOUNTER — MYC MEDICAL ADVICE (OUTPATIENT)
Dept: FAMILY MEDICINE | Facility: CLINIC | Age: 32
End: 2022-02-27
Payer: COMMERCIAL

## 2022-03-01 ENCOUNTER — MYC MEDICAL ADVICE (OUTPATIENT)
Dept: FAMILY MEDICINE | Facility: CLINIC | Age: 32
End: 2022-03-01
Payer: COMMERCIAL

## 2022-03-21 ENCOUNTER — NURSE TRIAGE (OUTPATIENT)
Dept: FAMILY MEDICINE | Facility: CLINIC | Age: 32
End: 2022-03-21
Payer: COMMERCIAL

## 2022-03-21 ENCOUNTER — TELEPHONE (OUTPATIENT)
Dept: FAMILY MEDICINE | Facility: CLINIC | Age: 32
End: 2022-03-21
Payer: COMMERCIAL

## 2022-03-21 NOTE — TELEPHONE ENCOUNTER
Patient requesting call back regarding symptoms.   Patient stated nausea and diarrhea for about 3-4 days.   Patient would like some advice and recommendations.    Please call patient   [phone]

## 2022-03-21 NOTE — TELEPHONE ENCOUNTER
"Patient stated nausea and diarrhea for about 3-4 days.   Patient would like some advice and recommendations.  See blow for more detail.   Edu on home care for diarrhea and to call back is symptoms worsen or do not improve.     Amaya Cerda RN  [facility]       Reason for Disposition    SEVERE diarrhea (e.g., 7 or more times / day more than normal)    Additional Information    Negative: Shock suspected (e.g., cold/pale/clammy skin, too weak to stand, low BP, rapid pulse)    Negative: Difficult to awaken or acting confused (e.g., disoriented, slurred speech)    Negative: Sounds like a life-threatening emergency to the triager    Negative: SEVERE abdominal pain (e.g., excruciating) and present > 1 hour    Negative: SEVERE abdominal pain and age > 60    Negative: Bloody, black, or tarry bowel movements (Exception: chronic-unchanged black-grey bowel movements and is taking iron pills or Pepto-bismol)    Negative: SEVERE diarrhea (e.g., 7 or more times / day more than normal) and age > 60 years    Negative: Constant abdominal pain lasting > 2 hours    Negative: Drinking very little and has signs of dehydration (e.g., no urine > 12 hours, very dry mouth, very lightheaded)    Negative: Patient sounds very sick or weak to the triager    Answer Assessment - Initial Assessment Questions  1. DIARRHEA SEVERITY: \"How bad is the diarrhea?\" \"How many extra stools have you had in the past 24 hours than normal?\"     - NO DIARRHEA (SCALE 0)    - MILD (SCALE 1-3): Few loose or mushy BMs; increase of 1-3 stools over normal daily number of stools; mild increase in ostomy output.    -  MODERATE (SCALE 4-7): Increase of 4-6 stools daily over normal; moderate increase in ostomy output.  * SEVERE (SCALE 8-10; OR 'WORST POSSIBLE'): Increase of 7 or more stools daily over normal; moderate increase in ostomy output; incontinence.      Moderate   2. ONSET: \"When did the diarrhea begin?\"       About 4 days ago   3. BM " "CONSISTENCY: \"How loose or watery is the diarrhea?\"       Watery and loose stool   4. VOMITING: \"Are you also vomiting?\" If so, ask: \"How many times in the past 24 hours?\"        and baby vomited twice, pt did not vomit but has been having diarrhea   5. ABDOMINAL PAIN: \"Are you having any abdominal pain?\" If yes: \"What does it feel like?\" (e.g., crampy, dull, intermittent, constant)       Pt does have cramps and stated its intermittent   6. ABDOMINAL PAIN SEVERITY: If present, ask: \"How bad is the pain?\"  (e.g., Scale 1-10; mild, moderate, or severe)    - MILD (1-3): doesn't interfere with normal activities, abdomen soft and not tender to touch     - MODERATE (4-7): interferes with normal activities or awakens from sleep, tender to touch     - SEVERE (8-10): excruciating pain, doubled over, unable to do any normal activities        mod  7. ORAL INTAKE: If vomiting, \"Have you been able to drink liquids?\" \"How much fluids have you had in the past 24 hours?\"      Can keep some fluids and food down   8. HYDRATION: \"Any signs of dehydration?\" (e.g., dry mouth [not just dry lips], too weak to stand, dizziness, new weight loss) \"When did you last urinate?\"      no  9. EXPOSURE: \"Have you traveled to a foreign country recently?\" \"Have you been exposed to anyone with diarrhea?\" \"Could you have eaten any food that was spoiled?\"      Baby sent home from day care sick   10. ANTIBIOTIC USE: \"Are you taking antibiotics now or have you taken antibiotics in the past 2 months?\"        no  11. OTHER SYMPTOMS: \"Do you have any other symptoms?\" (e.g., fever, blood in stool)        no  12. PREGNANCY: \"Is there any chance you are pregnant?\" \"When was your last menstrual period?\"        no    Protocols used: DIARRHEA-A-OH      "

## 2022-05-31 ENCOUNTER — OFFICE VISIT (OUTPATIENT)
Dept: FAMILY MEDICINE | Facility: CLINIC | Age: 32
End: 2022-05-31
Payer: COMMERCIAL

## 2022-05-31 VITALS
HEIGHT: 64 IN | WEIGHT: 119 LBS | BODY MASS INDEX: 20.32 KG/M2 | HEART RATE: 87 BPM | TEMPERATURE: 97.9 F | DIASTOLIC BLOOD PRESSURE: 67 MMHG | SYSTOLIC BLOOD PRESSURE: 97 MMHG | OXYGEN SATURATION: 97 %

## 2022-05-31 DIAGNOSIS — Z00.8 ENCOUNTER FOR BIOMETRIC SCREENING: ICD-10-CM

## 2022-05-31 DIAGNOSIS — Z00.00 ROUTINE GENERAL MEDICAL EXAMINATION AT A HEALTH CARE FACILITY: Primary | ICD-10-CM

## 2022-05-31 PROCEDURE — 99395 PREV VISIT EST AGE 18-39: CPT | Performed by: NURSE PRACTITIONER

## 2022-05-31 ASSESSMENT — ENCOUNTER SYMPTOMS
COUGH: 0
CHILLS: 0
JOINT SWELLING: 0
FEVER: 0
WEAKNESS: 0
PARESTHESIAS: 0
FREQUENCY: 0
DYSURIA: 0
SORE THROAT: 0
SHORTNESS OF BREATH: 0
HEADACHES: 0
DIARRHEA: 0
HEMATOCHEZIA: 0
EYE PAIN: 0
PALPITATIONS: 0
HEMATURIA: 0
NERVOUS/ANXIOUS: 0
ABDOMINAL PAIN: 0
ARTHRALGIAS: 0
DIZZINESS: 0
HEARTBURN: 0
CONSTIPATION: 0
MYALGIAS: 0
NAUSEA: 0

## 2022-05-31 NOTE — PROGRESS NOTES
SUBJECTIVE:   CC: Hakeem Meade is an 31 year old woman who presents for preventive health visit.       Patient has been advised of split billing requirements and indicates understanding: Yes  Healthy Habits:     Getting at least 3 servings of Calcium per day:  Yes    Bi-annual eye exam:  Yes    Dental care twice a year:  Yes    Sleep apnea or symptoms of sleep apnea:  None    Diet:  Regular (no restrictions)    Frequency of exercise:  2-3 days/week    Duration of exercise:  30-45 minutes    Taking medications regularly:  Not Applicable    PHQ-2 Total Score: 0    Additional concerns today:  No    Nausea and diarrhea in March - started after went back out to eat, symptoms have completely resolved starting in April.  Two days ago had pelvic pain after eating, then diarrhea, then felt like passing out.  This has happened a few times in the past  by many years    Low back pain June last year - occasionally has the low back if carries Colette; got some strengthening exercises at physical therapy and low back support    Today's PHQ-2 Score:   PHQ-2 ( 1999 [employer]) 5/31/2022   Q1: Little interest or pleasure in doing things 0   Q2: Feeling down, depressed or hopeless 0   PHQ-2 Score 0   PHQ-2 Total Score (12-17 Years)- Positive if 3 or more points; Administer PHQ-A if positive -   Q1: Little interest or pleasure in doing things Not at all   Q2: Feeling down, depressed or hopeless Not at all   PHQ-2 Score 0       Abuse: Current or Past (Physical, Sexual or Emotional) - No  Do you feel safe in your environment? Yes    Have you ever done Advance Care Planning? (For example, a Health Directive, POLST, or a discussion with a medical provider or your loved ones about your wishes): Yes, advance care planning is on file.    Social History     Tobacco Use     Smoking status: Never Smoker     Smokeless tobacco: Never Used   Substance Use Topics     Alcohol use: No     If you drink alcohol do you typically have >3 drinks per  day or >7 drinks per week? No    Alcohol Use 5/31/2022   Prescreen: >3 drinks/day or >7 drinks/week? Not Applicable   Prescreen: >3 drinks/day or >7 drinks/week? -   No flowsheet data found.    Reviewed orders with patient.  Reviewed health maintenance and updated orders accordingly - Yes  Lab work is in process  Labs reviewed in EPIC    Breast Cancer Screening:    FHS-7: No flowsheet data found.  click delete button to remove this line now  Patient under 40 years of age: Routine Mammogram Screening not recommended.   Pertinent mammograms are reviewed under the imaging tab.    History of abnormal Pap smear: NO - age 30-65 PAP every 5 years with negative HPV co-testing recommended  PAP / HPV Latest Ref Rng & Units 2/15/2021 2/12/2021 4/13/2018   PAP (Historical) - NIL - wnl   in careeverywhere   HPV16 NEG:Negative - Negative -   HPV18 NEG:Negative - Negative -   HRHPV NEG:Negative - Negative -     Reviewed and updated as needed this visit by clinical staff   Tobacco  Allergies  Meds  Problems  Med Hx  Surg Hx  Fam Hx  Soc   Hx          Reviewed and updated as needed this visit by Provider   Tobacco    Problems                  Review of Systems   Constitutional: Negative for chills and fever.   HENT: Negative for congestion, ear pain, hearing loss and sore throat.    Eyes: Negative for pain and visual disturbance.   Respiratory: Negative for cough and shortness of breath.    Cardiovascular: Negative for chest pain, palpitations and peripheral edema.   Gastrointestinal: Negative for abdominal pain, constipation, diarrhea, heartburn, hematochezia and nausea.   Genitourinary: Negative for dysuria, frequency, genital sores, hematuria and urgency.   Musculoskeletal: Negative for arthralgias, joint swelling and myalgias.   Skin: Negative for rash.   Neurological: Negative for dizziness, weakness, headaches and paresthesias.   Psychiatric/Behavioral: Negative for mood changes. The patient is not nervous/anxious.   "    Period shortened from 35 days to 22-23 days between menses.  First two days flow are similar.  Has attributed to stress.       OBJECTIVE:   BP 97/67   Pulse 87   Temp 97.9  F (36.6  C) (Oral)   Ht 1.62 m (5' 3.78\")   Wt 54 kg (119 lb)   SpO2 97%   BMI 20.57 kg/m    Physical Exam  GENERAL: healthy, alert and no distress  EYES: Eyes grossly normal to inspection, PERRL and conjunctivae and sclerae normal  HENT: ear canals and TM's normal, nose and mouth without ulcers or lesions  NECK: no adenopathy, no asymmetry, masses, or scars and thyroid normal to palpation  RESP: lungs clear to auscultation - no rales, rhonchi or wheezes  BREAST: normal without masses, tenderness or nipple discharge and no palpable axillary masses or adenopathy  CV: regular rate and rhythm, normal S1 S2, no S3 or S4, no murmur, click or rub, no peripheral edema and peripheral pulses strong  ABDOMEN: soft, generalized abdominal pain, no hepatosplenomegaly, no masses and bowel sounds normal  MS: no gross musculoskeletal defects noted, no edema  SKIN: no suspicious lesions or rashes  NEURO: Normal strength and tone, mentation intact and speech normal  PSYCH: mentation appears normal, affect normal/bright      Diagnostic Test Results:  Labs reviewed in Epic  pending    ASSESSMENT/PLAN:   (Z00.00) Routine general medical examination at a health care facility  (primary encounter diagnosis)  Comment:   Plan:     (Z00.8) Encounter for biometric screening  Comment:   Plan: Glucose, Hemoglobin A1c, Lipid panel reflex to         direct LDL Fasting              Patient has been advised of split billing requirements and indicates understanding: Yes    COUNSELING:  Reviewed preventive health counseling, as reflected in patient instructions    Estimated body mass index is 20.57 kg/m  as calculated from the following:    Height as of this encounter: 1.62 m (5' 3.78\").    Weight as of this encounter: 54 kg (119 lb).        She reports that she has never " smoked. She has never used smokeless tobacco.      Counseling Resources:  ATP IV Guidelines  Pooled Cohorts Equation Calculator  Breast Cancer Risk Calculator  BRCA-Related Cancer Risk Assessment: FHS-7 Tool  FRAX Risk Assessment  ICSI Preventive Guidelines  Dietary Guidelines for Americans, 2010  USDA's MyPlate  ASA Prophylaxis  Lung CA Screening    Mehnaz Ribera, MAMADOU CNP  [facility]

## 2022-05-31 NOTE — PATIENT INSTRUCTIONS
"  1.\"Eat food.  Not too much.  Mostly plants\" - Oli Pollen - see link below  - Aim for 5-7 servings of vegetables (raw vegetables - 1 serving = 1/2 cup; raw greens - 1 serving = 1 cup)   - Eat grains, but don't make them the superstar of your meal and DO make them whole grains  2. AVOID sugar.  There is no nutritional benefit to sugar.  3. Drink lots of water (avoid juice and soda)  4. MOVE your body daily - even if some days this means 5 minutes of movement. Walking is great for your bones and brain.  Do aim for 150 minutes per week of activity that raises your heart rate, but \"don't let the ideal get in the way of the good enough\"  5. Get good sleep (6-8 hours/night- less sleep is associated with disease/illness/obesity)   6. Smile and laugh every day - even in the face of tragedy  7. Start a meditation or mindfulness practice    CALCIUM: The average recommended intake for women is 6295-3473 mg calcium daily. It is best to get this from your diet (Milk, yogurt, and cheese, vegetables such as Chinese cabbage, kale, spinach and broccoli). If you are not eating enough calcium, then I recommend Calcium Citrate/vitD supplements daily.     Vitamin D is an essential nutritient that many [place] lack, especially in the winter. It is vital for healthy immune system functioning and can be linked to diseases such as obesity, diabetes, body aches/pain, etc. It is super safe and highly beneficial for a [place] to take a vitamin D3 2000 IU once daily during winter months. Daily vitamin D for life is recommended for anyone who has ever been found deficient.    Other things to consider: do not drive while under the influence of alcohol, do not drive while texting, always wear a seatbelt, wear a helmet when biking, wear sunscreen to help prevent skin cancer, use DEET mosquito spray when outdoors to prevent mosquito and tick bites, & avoid smoking. If you do get bit by a DEER tick, please contact my office immediately to " "consider lyme prophylaxis. Dental visits recommended every 6-12 months.    Oli Rangel's 7 Rules for Eating  https://www.[employer].com/food-recipes/news/66722216/7-rules-for-eating#1    My clinic hours are as follows:  Monday virtual appointments 12-2p  Tuesday in clinic appts 9a-5p  Thurs in clinic appts 7-9 am  If you need an appointment urgently and do not find one available on [employer] or with Central scheduling, please send me a [employer] message and I will work to get you scheduled with myself or a colleague here     Clinic notes  Lab and imaging results are now available for you to view immediately on completion.  Please know that I often have not seen the result before you see results.  I will interpret and discuss the results and meaning of the results as soon as I am able to review them.   [employer] is the best way to reach the clinic directly.  When you send a [employer] message this will be read by our clinic RNs.  Please know that when you call the clinic number, you are not speaking to a staff member from our local clinic.  You can ask that staff to speak to a clinic staff directly if you wish.  You will be able to read my documentation (\"provider notes\") when I finish up and close your chart.  I encourage you to read through to understand your diagnosis and the plan and how we arrived there.  It is also an opportunity to make sure I fully understood your concerns.      "

## 2022-06-11 ENCOUNTER — LAB (OUTPATIENT)
Dept: LAB | Facility: CLINIC | Age: 32
End: 2022-06-11
Payer: COMMERCIAL

## 2022-06-11 DIAGNOSIS — Z00.8 ENCOUNTER FOR BIOMETRIC SCREENING: ICD-10-CM

## 2022-06-11 LAB
CHOLEST SERPL-MCNC: 208 MG/DL
FASTING STATUS PATIENT QL REPORTED: YES
FASTING STATUS PATIENT QL REPORTED: YES
GLUCOSE BLD-MCNC: 93 MG/DL (ref 70–99)
HBA1C MFR BLD: 5.5 % (ref 0–5.6)
HDLC SERPL-MCNC: 86 MG/DL
LDLC SERPL CALC-MCNC: 112 MG/DL
NONHDLC SERPL-MCNC: 122 MG/DL
TRIGL SERPL-MCNC: 51 MG/DL

## 2022-06-11 PROCEDURE — 80061 LIPID PANEL: CPT

## 2022-06-11 PROCEDURE — 83036 HEMOGLOBIN GLYCOSYLATED A1C: CPT

## 2022-06-11 PROCEDURE — 82947 ASSAY GLUCOSE BLOOD QUANT: CPT

## 2022-06-11 PROCEDURE — 36415 COLL VENOUS BLD VENIPUNCTURE: CPT

## 2022-06-17 ENCOUNTER — MYC MEDICAL ADVICE (OUTPATIENT)
Dept: FAMILY MEDICINE | Facility: CLINIC | Age: 32
End: 2022-06-17
Payer: COMMERCIAL

## 2022-06-23 ENCOUNTER — VIRTUAL VISIT (OUTPATIENT)
Dept: FAMILY MEDICINE | Facility: CLINIC | Age: 32
End: 2022-06-23
Payer: COMMERCIAL

## 2022-06-23 DIAGNOSIS — R05.9 COUGH: ICD-10-CM

## 2022-06-23 DIAGNOSIS — R09.89 TICKLE IN THROAT: Primary | ICD-10-CM

## 2022-06-23 DIAGNOSIS — J30.89 SEASONAL ALLERGIC RHINITIS DUE TO OTHER ALLERGIC TRIGGER: ICD-10-CM

## 2022-06-23 PROCEDURE — 99213 OFFICE O/P EST LOW 20 MIN: CPT | Mod: 95 | Performed by: NURSE PRACTITIONER

## 2022-06-23 RX ORDER — CETIRIZINE HYDROCHLORIDE 10 MG/1
10 TABLET ORAL DAILY
Qty: 90 TABLET | Refills: 0 | Status: SHIPPED | OUTPATIENT
Start: 2022-06-23 | End: 2023-07-11

## 2022-06-23 NOTE — PROGRESS NOTES
"Hakeem is a 31 year old who is being evaluated via a billable telephone visit.      What phone number would you like to be contacted at? [phone]  End:  7:23 AM   Start:  7:01 AM   How would you like to obtain your AVS? MyChart    Assessment & Plan     Tickle in throat      Cough      Seasonal allergic rhinitis due to other allergic trigger  Suspect seasonal allergies.  Discussed nature of allergies and possible treatment options.  As symptoms have largely resolved, does not need to take any medication. However, if they return, particularly if cough urge present, I do want a trial of the oral anti-histamine.  - cetirizine (ZYRTEC) 10 MG tablet; Take 1 tablet (10 mg) by mouth daily    24 minutes spent on the date of the encounter doing chart review, history and exam, documentation and further activities per the note       Patient Instructions   You can take any anti-histamine as long a it doesn't have \"D\" after it.  Look for loratidine, cetirizine or fenofexadine - doesn't matter which.      Also add the nasal steroid fluticasone.  I usually find that adding a steroid nasal spray helps a lot with all symptoms.  One trick with using these is to only insert the nozzle slightly and then tip to point toward your eye rather than straight back.  Do not take a deep breath with administration.  These measures keep it from going back into your throat where it doesn't work, can irritate your throat and tastes awful.           Return in about 1 month (around 7/23/2022) for send MyChart with update.    MAMADOU [facility]    Subjective   Hakeem is a 31 year old accompanied by her self., presenting for the following health issues:  Throat Problem      HPI     Patient reports itching in her throat for one month.  It was so mild that initially she did not really think about it.  Described sensation of tickling or itching, but not pain. This was accompanied by mild urge to cough that she " could suppress if needed.  No fever, rhinorrhea, congestion, no PND, itchy or watery eyes, pressure in the ears, sneezing.  No itching or swelling of lips. Symptoms have since resolved, which patient attributes to possibly temperature change outside or period coming.    Patient has history of eczema, but no seasonal allergies or asthma. This is third summer in [place].  Has had negative covid tests in this month.    Review of Systems   Constitutional, HEENT, cardiovascular, pulmonary, gi and gu systems are negative, except as otherwise noted.      Objective           Vitals:  No vitals were obtained today due to virtual visit.    Physical Exam   healthy, alert and no distress  PSYCH: Alert and oriented times 3; coherent speech, normal   rate and volume, able to articulate logical thoughts, able   to abstract reason, no tangential thoughts, no hallucinations   or delusions  Her affect is normal and pleasant  RESP: No cough, no audible wheezing, able to talk in full sentences  Remainder of exam unable to be completed due to telephone visits        Phone call duration: 22 minutes  .  ..

## 2022-06-23 NOTE — PATIENT INSTRUCTIONS
"You can take any anti-histamine as long a it doesn't have \"D\" after it.  Look for loratidine, cetirizine or fenofexadine - doesn't matter which.      Also add the nasal steroid fluticasone.  I usually find that adding a steroid nasal spray helps a lot with all symptoms.  One trick with using these is to only insert the nozzle slightly and then tip to point toward your eye rather than straight back.  Do not take a deep breath with administration.  These measures keep it from going back into your throat where it doesn't work, can irritate your throat and tastes awful.       "

## 2022-07-14 ENCOUNTER — OFFICE VISIT (OUTPATIENT)
Dept: OPTOMETRY | Facility: CLINIC | Age: 32
End: 2022-07-14
Payer: COMMERCIAL

## 2022-07-14 DIAGNOSIS — Z46.0 CONTACT LENS/GLASSES FITTING: Primary | ICD-10-CM

## 2022-07-14 DIAGNOSIS — H52.13 MYOPIA OF BOTH EYES: ICD-10-CM

## 2022-07-14 PROCEDURE — 92310 CONTACT LENS FITTING OU: CPT | Mod: GA | Performed by: OPTOMETRIST

## 2022-07-14 PROCEDURE — 99207 PR NO CHARGE LOS: CPT | Performed by: OPTOMETRIST

## 2022-07-14 ASSESSMENT — EXTERNAL EXAM - LEFT EYE: OS_EXAM: NORMAL

## 2022-07-14 ASSESSMENT — REFRACTION_CURRENTRX
OD_BRAND: ALCON DAILIES AQUA COMFORT PLUS BC 8.7, D 14.0
OS_SPHERE: -5.50
OS_BRAND: ALCON DAILIES AQUA COMFORT PLUS BC 8.7, D 14.0
OD_SPHERE: -5.50

## 2022-07-14 ASSESSMENT — REFRACTION_MANIFEST
OS_SPHERE: -6.00
OS_CYLINDER: SPHERE
OD_CYLINDER: SPHERE
OD_SPHERE: -6.00

## 2022-07-14 ASSESSMENT — SLIT LAMP EXAM - LIDS
COMMENTS: NORMAL
COMMENTS: NORMAL

## 2022-07-14 ASSESSMENT — EXTERNAL EXAM - RIGHT EYE: OD_EXAM: NORMAL

## 2022-07-14 NOTE — PATIENT INSTRUCTIONS
Begin trial of Aquacomfort Plus contact lenses.     Maximum wear-time of 12 hours/day of the contact lenses.   No sleeping or swimming in the contact lenses.   Replace each lens after one day of wearing.     If adequate comfort/vision with contact lenses, we can finalize the prescription. If not, notify me and we can consider other options.       Singh Andujar, GISELA  [address] [place]  [place]  [address]    [phone]

## 2022-07-14 NOTE — LETTER
7/14/2022         RE: Hakeem Meade  [address] [place] [address]        Dear Colleague,    Thank you for referring your patient, Hakeem Meade, to the [facility]. Please see a copy of my visit note below.    Chief Complaint   Patient presents with     Contact Lens Evaluation       Patient interested in wearing daily contact lenses again. Used to wear Coopervision dailies about 4-5 years ago; only wore the lenses sparingly. Plans to still only wear the lenses very seldomly for certain activities.         Medical, surgical and family histories reviewed and updated 7/14/2022.         OBJECTIVE: See Ophthalmology exam    ASSESSMENT:    ICD-10-CM    1. Contact lens/glasses fitting  Z46.0    2. Myopia of both eyes  H52.13       PLAN:    Patient Instructions   Begin trial of Aquacomfort Plus contact lenses.     Maximum wear-time of 12 hours/day of the contact lenses.   No sleeping or swimming in the contact lenses.   Replace each lens after one day of wearing.     If adequate comfort/vision with contact lenses, we can finalize the prescription. If not, notify me and we can consider other options.       Singh Andujar OD  [facility]  [address] [place] [address]. [place]  [address]    [phone]               Again, thank you for allowing me to participate in the care of your patient.        Sincerely,        Singh Andujar OD

## 2022-07-14 NOTE — PROGRESS NOTES
Chief Complaint   Patient presents with     Contact Lens Evaluation       Patient interested in wearing daily contact lenses again. Used to wear Coopervision dailies about 4-5 years ago; only wore the lenses sparingly. Plans to still only wear the lenses very seldomly for certain activities.         Medical, surgical and family histories reviewed and updated 7/14/2022.         OBJECTIVE: See Ophthalmology exam    ASSESSMENT:    ICD-10-CM    1. Contact lens/glasses fitting  Z46.0    2. Myopia of both eyes  H52.13       PLAN:    Patient Instructions   Begin trial of Aquacomfort Plus contact lenses.     Maximum wear-time of 12 hours/day of the contact lenses.   No sleeping or swimming in the contact lenses.   Replace each lens after one day of wearing.     If adequate comfort/vision with contact lenses, we can finalize the prescription. If not, notify me and we can consider other options.       Singh Andujar, GISELA  [facility]  [address] [place] [address]. [place]  [place]  [address]    [phone]

## 2022-09-18 ENCOUNTER — HEALTH MAINTENANCE LETTER (OUTPATIENT)
Age: 32
End: 2022-09-18

## 2022-11-08 ENCOUNTER — ALLIED HEALTH/NURSE VISIT (OUTPATIENT)
Dept: FAMILY MEDICINE | Facility: CLINIC | Age: 32
End: 2022-11-08
Payer: COMMERCIAL

## 2022-11-08 DIAGNOSIS — Z23 NEED FOR VACCINATION: Primary | ICD-10-CM

## 2022-11-08 PROCEDURE — 90471 IMMUNIZATION ADMIN: CPT

## 2022-11-08 PROCEDURE — 90686 IIV4 VACC NO PRSV 0.5 ML IM: CPT

## 2022-11-08 PROCEDURE — 99207 PR NO CHARGE NURSE ONLY: CPT

## 2022-11-08 NOTE — PROGRESS NOTES
Prior to immunization administration, verified patients identity using patient s name and date of birth. Please see Immunization Activity for additional information.     Screening Questionnaire for Adult Immunization    Are you sick today?   No   Do you have allergies to medications, food, a vaccine component or latex?   No   Have you ever had a serious reaction after receiving a vaccination?   No   Do you have a long-term health problem with heart, lung, kidney, or metabolic disease (e.g., diabetes), asthma, a blood disorder, no spleen, complement component deficiency, a cochlear implant, or a spinal fluid leak?  Are you on long-term aspirin therapy?   No   Do you have cancer, leukemia, HIV/AIDS, or any other immune system problem?   No   Do you have a parent, brother, or sister with an immune system problem?   No   In the past 3 months, have you taken medications that affect  your immune system, such as prednisone, other steroids, or anticancer drugs; drugs for the treatment of rheumatoid arthritis, Crohn s disease, or psoriasis; or have you had radiation treatments?   No   Have you had a seizure, or a brain or other nervous system problem?   No   During the past year, have you received a transfusion of blood or blood    products, or been given immune (gamma) globulin or antiviral drug?   No   For women: Are you pregnant or is there a chance you could become       pregnant during the next month?   No   Have you received any vaccinations in the past 4 weeks?   No     Immunization questionnaire answers were all negative.        Per orders of Katie Ribera NP, injection of fLU SHOT given by Layne Sage CMA. Patient instructed to remain in clinic for 15 minutes afterwards, and to report any adverse reaction to me immediately.       Screening performed by Layne Sage CMA on 11/8/2022 at 2:45 PM.

## 2023-05-09 ENCOUNTER — OFFICE VISIT (OUTPATIENT)
Dept: FAMILY MEDICINE | Facility: CLINIC | Age: 33
End: 2023-05-09
Payer: COMMERCIAL

## 2023-05-09 VITALS
DIASTOLIC BLOOD PRESSURE: 72 MMHG | TEMPERATURE: 98.7 F | WEIGHT: 129.5 LBS | HEIGHT: 64 IN | HEART RATE: 101 BPM | SYSTOLIC BLOOD PRESSURE: 106 MMHG | RESPIRATION RATE: 20 BRPM | OXYGEN SATURATION: 96 % | BODY MASS INDEX: 22.11 KG/M2

## 2023-05-09 DIAGNOSIS — Z23 NEED FOR COVID-19 VACCINE: ICD-10-CM

## 2023-05-09 DIAGNOSIS — Z00.00 ROUTINE GENERAL MEDICAL EXAMINATION AT A HEALTH CARE FACILITY: Primary | ICD-10-CM

## 2023-05-09 PROCEDURE — 0124A COVID-19 BIVALENT 12+ (PFIZER): CPT | Performed by: NURSE PRACTITIONER

## 2023-05-09 PROCEDURE — 99395 PREV VISIT EST AGE 18-39: CPT | Mod: 25 | Performed by: NURSE PRACTITIONER

## 2023-05-09 PROCEDURE — 91312 COVID-19 BIVALENT 12+ (PFIZER): CPT | Performed by: NURSE PRACTITIONER

## 2023-05-09 ASSESSMENT — PAIN SCALES - GENERAL: PAINLEVEL: NO PAIN (0)

## 2023-05-09 ASSESSMENT — ENCOUNTER SYMPTOMS
FEVER: 0
CHILLS: 0
WEAKNESS: 0
HEMATOCHEZIA: 0
SHORTNESS OF BREATH: 0
CONSTIPATION: 0
PARESTHESIAS: 0
COUGH: 0
NAUSEA: 0
HEADACHES: 0
HEMATURIA: 0
ARTHRALGIAS: 0
SORE THROAT: 0
JOINT SWELLING: 0
DYSURIA: 0
MYALGIAS: 0
EYE PAIN: 0
HEARTBURN: 0
ABDOMINAL PAIN: 0
PALPITATIONS: 0
DIARRHEA: 0
NERVOUS/ANXIOUS: 0
BREAST MASS: 0
FREQUENCY: 0
DIZZINESS: 0

## 2023-05-09 NOTE — PATIENT INSTRUCTIONS
"Magnesium glycinate 400-600 mg nighty  Start a prenatal vitamin      1.\"Eat food.  Not too much.  Mostly plants\" - Oli Pollen - see link below  - Aim for 5-7 servings of vegetables (raw vegetables - 1 serving = 1/2 cup; raw greens - 1 serving = 1 cup)   - Eat grains, but don't make them the superstar of your meal and DO make them whole grains  2. AVOID sugar.  There is no nutritional benefit to sugar.  3. Drink lots of water (avoid juice and soda)  4. MOVE your body daily - even if some days this means 5 minutes of movement. Walking is great for your bones and brain.  Do aim for 150 minutes per week of activity that raises your heart rate, but \"don't let the ideal get in the way of the good enough\"  5. Get good sleep (6-8 hours/night- less sleep is associated with disease/illness/obesity)   6. Smile and laugh every day - even in the face of tragedy  7. Start a meditation or mindfulness practice    CALCIUM: The average recommended intake for women is 1819-8470 mg calcium daily. It is best to get this from your diet (Milk, yogurt, and cheese, vegetables such as Chinese cabbage, kale, spinach and broccoli). If you are not eating enough calcium, then I recommend Calcium Citrate/vitD supplements daily.     Vitamin D is an essential nutritient that many [place] lack, especially in the winter. It is vital for healthy immune system functioning and can be linked to diseases such as obesity, diabetes, body aches/pain, etc. It is super safe and highly beneficial for a [place] to take a vitamin D3 2000 IU once daily during winter months. Daily vitamin D for life is recommended for anyone who has ever been found deficient.    Other things to consider: do not drive while under the influence of alcohol, do not drive while texting, always wear a seatbelt, wear a helmet when biking, wear sunscreen to help prevent skin cancer, use DEET mosquito spray when outdoors to prevent mosquito and tick bites, & avoid smoking. If you " "do get bit by a DEER tick, please contact my office immediately to consider lyme prophylaxis. Dental visits recommended every 6-12 months.    Oli Rangel's 7 Rules for Eating  https://[email or web address]/food-recipes/news/20090323/7-rules-for-eating#1    My clinic hours are as follows:  Monday virtual appointments 12-2p  Tuesday in clinic appts 9a-5p  Thursday virtual appts 7a-9a  If you need an appointment urgently and do not find one available on [employer] or with Central scheduling, please send me a [employer] message and I will work to get you scheduled with myself or a colleague here  I do not work Fridays and e-visits submitted late Thursday or on Friday may not be seen until Monday     Clinic notes  Lab and imaging results are now available for you to view immediately on completion.  Please know that I often have not seen the result before you see results.  I will interpret and discuss the results and meaning of the results as soon as I am able to review them.   [employer] is the best way to reach the clinic directly.  When you send a [employer] message this will be read by our clinic RNs.  Please know that when you call the clinic number, you are not speaking to a staff member from our local clinic.  You can ask that staff to speak to a clinic staff directly if you wish.  You will be able to read my documentation (\"provider notes\") when I finish up and close your chart.  I encourage you to read through to understand your diagnosis and the plan and how we arrived there.  It is also an opportunity to make sure I fully understood your concerns.    "

## 2023-05-09 NOTE — PROGRESS NOTES
SUBJECTIVE:   CC: Hakeem is an 32 year old who presents for preventive health visit.   Patient has been advised of split billing requirements and indicates understanding: Yes   Roomed by ROEL PLAZA RN on 5/9/2023 at 1:09 PM      Healthy Habits:     Getting at least 3 servings of Calcium per day:  Yes    Bi-annual eye exam:  Yes    Dental care twice a year:  Yes    Sleep apnea or symptoms of sleep apnea:  None    Diet:  Regular (no restrictions)    Frequency of exercise:  1 day/week    Duration of exercise:  Less than 15 minutes    Taking medications regularly:  Not Applicable    Medication side effects:  Not applicable    PHQ-2 Total Score: 0    Additional concerns today:  No    Tension physically, often in head, with work stress.   Also will get headaches with periods  Not exercising more      Today's PHQ-2 Score:       5/9/2023    12:53 PM   PHQ-2 ( 1999 [employer])   Q1: Little interest or pleasure in doing things 0   Q2: Feeling down, depressed or hopeless 0   PHQ-2 Score 0   Q1: Little interest or pleasure in doing things Not at all    Not at all   Q2: Feeling down, depressed or hopeless Not at all    Not at all   PHQ-2 Score 0    0           Social History     Tobacco Use     Smoking status: Never     Smokeless tobacco: Never   Vaping Use     Vaping status: Not on file   Substance Use Topics     Alcohol use: No             5/9/2023    12:53 PM   Alcohol Use   Prescreen: >3 drinks/day or >7 drinks/week? Not Applicable          View : No data to display.              Reviewed orders with patient.  Reviewed health maintenance and updated orders accordingly - Yes  Lab work is in process  Labs reviewed in EPIC    Breast Cancer Screening:    FHS-7:        View : No data to display.              click delete button to remove this line now  Patient under 40 years of age: Routine Mammogram Screening not recommended.   Pertinent mammograms are reviewed under the imaging tab.    History of abnormal Pap smear: NO - age  "30-65 PAP every 5 years with negative HPV co-testing recommended      Latest Ref Rng & Units 2/15/2021    12:03 PM 2/12/2021     3:00 PM 4/13/2018    12:00 AM   PAP / HPV   PAP (Historical)  NIL    wnl   in careeverywhere        HPV 16 DNA NEG^Negative  Negative      HPV 18 DNA NEG^Negative  Negative      Other HR HPV NEG^Negative  Negative          This result is from an external source.     Reviewed and updated as needed this visit by clinical staff   Tobacco  Allergies  Meds              Reviewed and updated as needed this visit by Provider                     Review of Systems   Constitutional: Negative for chills and fever.   HENT: Negative for congestion, ear pain, hearing loss and sore throat.    Eyes: Negative for pain and visual disturbance.   Respiratory: Negative for cough and shortness of breath.    Cardiovascular: Negative for chest pain, palpitations and peripheral edema.   Gastrointestinal: Negative for abdominal pain, constipation, diarrhea, heartburn, hematochezia and nausea.   Breasts:  Negative for tenderness, breast mass and discharge.   Genitourinary: Negative for dysuria, frequency, genital sores, hematuria, pelvic pain, urgency, vaginal bleeding and vaginal discharge.   Musculoskeletal: Negative for arthralgias, joint swelling and myalgias.   Skin: Negative for rash.   Neurological: Negative for dizziness, weakness, headaches and paresthesias.   Psychiatric/Behavioral: Negative for mood changes. The patient is not nervous/anxious.         OBJECTIVE:   /72 (BP Location: Right arm, Patient Position: Sitting, Cuff Size: Adult Regular)   Pulse 101   Temp 98.7  F (37.1  C) (Temporal)   Resp 20   Ht 1.625 m (5' 3.98\")   Wt 58.7 kg (129 lb 8 oz)   LMP 04/30/2023 (Exact Date)   SpO2 96%   BMI 22.25 kg/m    Physical Exam  GENERAL: healthy, alert and no distress  EYES: Eyes grossly normal to inspection, PERRL and conjunctivae and sclerae normal  HENT: ear canals and TM's normal, nose " and mouth without ulcers or lesions  NECK: no adenopathy, no asymmetry, masses, or scars and thyroid normal to palpation  RESP: lungs clear to auscultation - no rales, rhonchi or wheezes  BREAST: normal without masses, tenderness or nipple discharge and no palpable axillary masses or adenopathy  CV: regular rate and rhythm, normal S1 S2, no S3 or S4, no murmur, click or rub, no peripheral edema and peripheral pulses strong  ABDOMEN: soft, nontender, no hepatosplenomegaly, no masses and bowel sounds normal  MS: no gross musculoskeletal defects noted, no edema  SKIN: no suspicious lesions or rashes  NEURO: Normal strength and tone, mentation intact and speech normal  PSYCH: mentation appears normal, affect normal/bright    Diagnostic Test Results:  Labs reviewed in Epic  none     ASSESSMENT/PLAN:   (Z00.00) Routine general medical examination at a health care facility  (primary encounter diagnosis)  Comment:   Plan: Try magnesium for headaches and stress  Look for hep B vaccine dates  Start taking prenatal vitamin    (Z23) Need for COVID-19 vaccine  Comment:   Plan: COVID-19 BIVALENT 12+ (PFIZER)              Patient has been advised of split billing requirements and indicates understanding: Yes      COUNSELING:  Reviewed preventive health counseling, as reflected in patient instructions        She reports that she has never smoked. She has never used smokeless tobacco.    MAMADOU Horvath CNP  [facility]

## 2023-05-18 ENCOUNTER — MYC MEDICAL ADVICE (OUTPATIENT)
Dept: FAMILY MEDICINE | Facility: CLINIC | Age: 33
End: 2023-05-18
Payer: COMMERCIAL

## 2023-05-18 DIAGNOSIS — Z13.1 SCREENING FOR DIABETES MELLITUS: Primary | ICD-10-CM

## 2023-05-18 DIAGNOSIS — Z13.220 LIPID SCREENING: ICD-10-CM

## 2023-05-18 NOTE — TELEPHONE ENCOUNTER
Please see pt mychart from today, pended labs if you agree.    Joseph Hayward RN   [facility]

## 2023-06-10 ENCOUNTER — LAB (OUTPATIENT)
Dept: LAB | Facility: CLINIC | Age: 33
End: 2023-06-10
Payer: COMMERCIAL

## 2023-06-10 DIAGNOSIS — Z13.220 LIPID SCREENING: ICD-10-CM

## 2023-06-10 DIAGNOSIS — Z13.1 SCREENING FOR DIABETES MELLITUS: ICD-10-CM

## 2023-06-10 LAB
CHOLEST SERPL-MCNC: 208 MG/DL
FASTING STATUS PATIENT QL REPORTED: YES
GLUCOSE SERPL-MCNC: 103 MG/DL (ref 70–99)
HDLC SERPL-MCNC: 73 MG/DL
LDLC SERPL CALC-MCNC: 121 MG/DL
NONHDLC SERPL-MCNC: 135 MG/DL
TRIGL SERPL-MCNC: 68 MG/DL

## 2023-06-10 PROCEDURE — 36415 COLL VENOUS BLD VENIPUNCTURE: CPT | Performed by: PATHOLOGY

## 2023-06-10 PROCEDURE — 80061 LIPID PANEL: CPT | Performed by: PATHOLOGY

## 2023-06-10 PROCEDURE — 82947 ASSAY GLUCOSE BLOOD QUANT: CPT | Performed by: PATHOLOGY

## 2023-06-16 ENCOUNTER — MYC MEDICAL ADVICE (OUTPATIENT)
Dept: FAMILY MEDICINE | Facility: CLINIC | Age: 33
End: 2023-06-16
Payer: COMMERCIAL

## 2023-07-10 ENCOUNTER — E-VISIT (OUTPATIENT)
Dept: FAMILY MEDICINE | Facility: CLINIC | Age: 33
End: 2023-07-10
Payer: COMMERCIAL

## 2023-07-10 ENCOUNTER — MYC MEDICAL ADVICE (OUTPATIENT)
Dept: FAMILY MEDICINE | Facility: CLINIC | Age: 33
End: 2023-07-10
Payer: COMMERCIAL

## 2023-07-10 DIAGNOSIS — R35.0 URINARY FREQUENCY: Primary | ICD-10-CM

## 2023-07-10 PROCEDURE — 99422 OL DIG E/M SVC 11-20 MIN: CPT | Performed by: NURSE PRACTITIONER

## 2023-07-11 ENCOUNTER — LAB (OUTPATIENT)
Dept: LAB | Facility: CLINIC | Age: 33
End: 2023-07-11
Payer: COMMERCIAL

## 2023-07-11 DIAGNOSIS — R35.0 URINARY FREQUENCY: ICD-10-CM

## 2023-07-11 LAB
ALBUMIN UR-MCNC: NEGATIVE MG/DL
APPEARANCE UR: CLEAR
BACTERIA #/AREA URNS HPF: ABNORMAL /HPF
BILIRUB UR QL STRIP: NEGATIVE
COLOR UR AUTO: YELLOW
GLUCOSE UR STRIP-MCNC: NEGATIVE MG/DL
HGB UR QL STRIP: NEGATIVE
KETONES UR STRIP-MCNC: NEGATIVE MG/DL
LEUKOCYTE ESTERASE UR QL STRIP: ABNORMAL
NITRATE UR QL: NEGATIVE
PH UR STRIP: 6 [PH] (ref 5–7)
RBC #/AREA URNS AUTO: ABNORMAL /HPF
SP GR UR STRIP: 1.02 (ref 1–1.03)
SQUAMOUS #/AREA URNS AUTO: ABNORMAL /LPF
UROBILINOGEN UR STRIP-ACNC: 0.2 E.U./DL
WBC #/AREA URNS AUTO: ABNORMAL /HPF

## 2023-07-11 PROCEDURE — 81001 URINALYSIS AUTO W/SCOPE: CPT

## 2023-10-15 ENCOUNTER — NURSE TRIAGE (OUTPATIENT)
Dept: NURSING | Facility: CLINIC | Age: 33
End: 2023-10-15
Payer: COMMERCIAL

## 2023-10-15 NOTE — TELEPHONE ENCOUNTER
The patient says she is 4 weeks pregnant  She is inquiring about travel and radiation when going through security check points at the airport.  She is also inquiring when she should make her first prenatal appointment  She says she took a home pregnancy test and it was positive.   Triage guidelines recommend to home care  Caller verbalized and understands directives    Reason for Disposition   ALSO, Normal pregnancy -  how to stay healthy    Additional Information   Negative: Severe vaginal bleeding (e.g., continuous red blood from vagina, or large blood clots)   Negative: Shock suspected (very weak, limp, too weak to stand, pale cool skin)   Negative: Severe difficulty breathing (e.g., struggling for each breath, speaks in single words)   Negative: Umbilical cord hanging out of the vagina (shiny, white, curled appearance)   Negative: Uncontrollable urge to push (i.e., feels like baby is coming out now) or can see baby   Negative: Seizure occurred and has stopped   Negative: Sounds like a life-threatening emergency to the triager   Negative: [1] Teen is pregnant AND [2] ADULT pregnancy guidelines are available,  go to the appropriate ADULT pregnancy symptom guideline   Negative: [1] Pregnancy suspected BUT [2] no positive pregnancy test AND [3] abdominal pain   Negative: [1] Pregnancy suspected BUT [2] no positive pregnancy test AND [3] vaginal bleeding   Negative: [1] Pregnancy suspected BUT [2] no positive pregnancy test AND [3] no urgent symptoms   Negative: [1] Pregnant AND [2] abdominal injury or MVA    Protocols used: Pregnancy [id number]

## 2023-10-27 ENCOUNTER — VIRTUAL VISIT (OUTPATIENT)
Dept: OBGYN | Facility: CLINIC | Age: 33
End: 2023-10-27
Payer: COMMERCIAL

## 2023-10-27 DIAGNOSIS — O09.529 SUPERVISION OF HIGH-RISK PREGNANCY OF ELDERLY MULTIGRAVIDA: Primary | ICD-10-CM

## 2023-10-27 DIAGNOSIS — O09.521 MULTIGRAVIDA OF ADVANCED MATERNAL AGE IN FIRST TRIMESTER: ICD-10-CM

## 2023-10-27 DIAGNOSIS — O09.519 SUPERVISION OF HIGH-RISK PREGNANCY OF ELDERLY PRIMIGRAVIDA: ICD-10-CM

## 2023-10-27 PROCEDURE — 99207 PR NO CHARGE NURSE ONLY: CPT

## 2023-10-27 RX ORDER — PNV NO.95/FERROUS FUM/FOLIC AC 28MG-0.8MG
1 TABLET ORAL DAILY
Status: ON HOLD | COMMUNITY
End: 2024-06-05

## 2023-10-27 ASSESSMENT — ANXIETY QUESTIONNAIRES
5. BEING SO RESTLESS THAT IT IS HARD TO SIT STILL: NOT AT ALL
GAD7 TOTAL SCORE: 0
3. WORRYING TOO MUCH ABOUT DIFFERENT THINGS: NOT AT ALL
6. BECOMING EASILY ANNOYED OR IRRITABLE: NOT AT ALL
GAD7 TOTAL SCORE: 0
2. NOT BEING ABLE TO STOP OR CONTROL WORRYING: NOT AT ALL
7. FEELING AFRAID AS IF SOMETHING AWFUL MIGHT HAPPEN: NOT AT ALL
1. FEELING NERVOUS, ANXIOUS, OR ON EDGE: NOT AT ALL

## 2023-10-27 ASSESSMENT — PATIENT HEALTH QUESTIONNAIRE - PHQ9
5. POOR APPETITE OR OVEREATING: NOT AT ALL
SUM OF ALL RESPONSES TO PHQ QUESTIONS 1-9: 0

## 2023-10-27 NOTE — PROGRESS NOTES
SUBJECTIVE:     HPI:  This is a 33 year old female patient,  who presents for her first obstetrical visit.    DACIA: 2024, by Last Menstrual Period.  She is 6w4d weeks.  Her cycles are irregular.  Her last menstrual period was normal.   Since her LMP, she has had no complaints).   She denies nausea, emesis, abdominal pain, fatigue, headache, loss of appetite, vaginal discharge, dysuria, pelvic pain, urinary urgency, lightheadedness, urinary frequency, vaginal bleeding, hemorrhoids, and constipation.    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      She has a history of   D&E d/t chromosomal anomaly, follow by term vaginal birth c/o complications.    Since her last LMP she denies use of alcohol, tobacco and street drugs.    Past medical, surgical, social and family history were reviewed and updated in [facility].    Current Outpatient Medications   Medication    Prenatal Vit-Fe Fumarate-FA (PRENATAL VITAMIN) 27-0.8 MG TABS     No current facility-administered medications for this visit.     Facility-Administered Medications Ordered in Other Visits   Medication    lidocaine-EPINEPHrine 1.5 %-[id number] injection       ROS:   12 point review of systems negative other than symptoms noted below or in the HPI.    Nurse phone visit completed. Prenatal book and folder (containing standard educational hand-outs and brochures) will be given at the  next visit to patient. Information in folder reviewed over the phone. Questions answered. Brochure given on optional screening available to assess chromosomal anomalies. Pt unsure at this time about if desires NIPS. Pt advised to call the clinic if she has any questions or concerns related to her pregnancy. Prenatal labs future ordered. New prenatal visit scheduled on 11/20 with Dr. Dalia Salazar.      Lab Results   Component Value Date    PAP NIL 02/15/2021     PHQ-9 score:        10/27/2023     2:12 PM   PHQ   PHQ-9 Total Score 0   Q9: Thoughts of better off dead/self-harm past 2 weeks Not at all         4/15/2020     8:50 AM 6/22/2020    11:05 AM 10/27/2023     2:12 PM   SENDY-7 SCORE   Total Score 0 0 0     Patient supplied answers from flow sheet for:  Prenatal OB Questionnaire.  Past Medical History  Have you ever recieved care for your mental health? : No  Have you ever been in a major accident or suffered serious trauma?: No  Within the last year, has anyone hit, slapped, kicked or otherwise hurt you?: No  In the last year, has anyone forced you to have sex when you didn't want to?: No    Past Medical History 2   Have you ever received a blood transfusion?: No  Would you accept a blood transfusion if was medically recommended?: Yes  Does anyone in your home smoke?: No   Is your blood type Rh negative?: No  Have you ever ?: (!) Yes  Have you been hospitalized for a nonsurgical reason excluding normal delivery?: No  Have you ever had an abnormal pap smear?: No    Past Medical History (Continued)  Do you have a history of abnormalities of the uterus?: No  Did your mother take ILSA or any other hormones when she was pregnant with you?: Unknown  Do you have any other problems we have not asked about which you feel may be important to this pregnancy?: No     RN informed pt to use [employer] messaging for non-urgent concerns or questions, only answered M-F, [phone] to [phone]. Pt understands to call the direct clinic  line for any urgent concerns, particularly red flag concerns like excessive vomiting, vaginal bleeding or LOF, contractions, decreased FM. Pt understands that education related to pregnancy, medications safe in pregnancy are sent as mychart message, as well as having several educational handouts in nurse visit AVS. Pt verbalized understanding, in agreement with plan, and voiced no further questions.      Pt was seen on 3rd floor/S for past pregnancies, got scheduled incorrectly with 7th floor with other Dr. Salazar.  Pt would like to continue care with 3rd floor. Also would like to know since she got a COVID booster in May, if she should get the new one now. Will plan to get flu shot. Pt wants to know if DEET okay to use in pregnancy on her cruise.    Routing pt chart to [facility] RN pool to call pt to schedule NOB with their clinic for OB care, when scheduled they can cancel 7th floor appts.   Pt verbalized understanding, in agreement with plan, and voiced no further questions.  Sherita Kennedy RN on 10/27/2023 at 3:07 PM

## 2023-10-27 NOTE — Clinical Note
Pt scheduled at our clinic---I did her NOB today and didn't realize until the end she's your patient, would like her care to be continued there. Please reach out to schedule pt for NOB visit with MD and ultrasound on 3rd floor :) Thank you! Completed education, just didn't place new orders since I don't know which MD to use for your group

## 2023-10-27 NOTE — PATIENT INSTRUCTIONS
Learning About Pregnancy  Your Care Instructions     Your health in the early weeks of your pregnancy is particularly important for your baby's health. Take good care of yourself. Anything you do that harms your body can also harm your baby.  Make sure to go to all of your doctor appointments. Regular checkups will help keep you and your baby healthy.  How can you care for yourself at home?  Diet    Eat a balanced diet. Make sure your diet includes plenty of beans, peas, and leafy green vegetables.     Do not skip meals or go for many hours without eating. If you are nauseated, try to eat a small, healthy snack every 2 to 3 hours.     Do not eat fish that has a high level of mercury, such as shark, swordfish, or mackerel. Do not eat more than one can of tuna each week.     Drink plenty of fluids. If you have kidney, heart, or liver disease and have to limit fluids, talk with your doctor before you increase the amount of fluids you drink.     Cut down on caffeine, such as coffee, tea, and cola.     Do not drink alcohol, such as beer, wine, or hard liquor.     Take a multivitamin that contains at least 400 micrograms (mcg) of folic acid to help prevent birth defects. Fortified cereal and whole wheat bread are good additional sources of folic acid.     Increase the calcium in your diet. Try to drink a quart of skim milk each day. You may also take calcium supplements and choose foods such as cheese and yogurt.   Lifestyle    Make sure you go to your follow-up appointments.     Get plenty of rest. You may be unusually tired while you are pregnant.     Get at least 30 minutes of exercise on most days of the week. Walking is a good choice. If you have not exercised in the past, start out slowly. Take several short walks each day.     Do not smoke. If you need help quitting, talk to your doctor about stop-smoking programs. These can increase your chances of quitting for good.     Do not touch cat feces or litter boxes.  Also, wash your hands after you handle raw meat, and fully cook all meat before you eat it. Wear gloves when you work in the yard or garden, and wash your hands well when you are done. Cat feces, raw or undercooked meat, and contaminated dirt can cause an infection that may harm your baby or lead to a miscarriage.     Avoid things that can make your body too hot and may be harmful to your baby, such as a hot tub or sauna. Or talk with your doctor before doing anything that raises your body temperature. Your doctor can tell you if it's safe.     Avoid chemical fumes, paint fumes, or poisons.     Do not use illegal drugs, marijuana, or alcohol.   Medicines    Review all of your medicines with your doctor. Some of your routine medicines may need to be changed to protect your baby.     Use acetaminophen (Tylenol) to relieve minor problems, such as a mild headache or backache or a mild fever with cold symptoms. Do not use nonsteroidal anti-inflammatory drugs (NSAIDs), such as ibuprofen (Advil, Motrin) or naproxen (Aleve), unless your doctor says it is okay.     Do not take two or more pain medicines at the same time unless the doctor told you to. Many pain medicines have acetaminophen, which is Tylenol. Too much acetaminophen (Tylenol) can be harmful.     Take your medicines exactly as prescribed. Call your doctor if you think you are having a problem with your medicine.   To manage morning sickness    If you feel sick when you first wake up, try eating a small snack (such as crackers) before you get out of bed. Allow some time to digest the snack, and then get out of bed slowly.     Do not skip meals or go for long periods without eating. An empty stomach can make nausea worse.     Eat small, frequent meals instead of three large meals each day.     Drink plenty of fluids.     Eat foods that are high in protein but low in fat.     If you are taking iron supplements, ask your doctor if they are necessary. Iron can make  "nausea worse.     Avoid any smells, such as coffee, that make you feel sick.     Get lots of rest. Morning sickness may be worse when you are tired.   Follow-up care is a key part of your treatment and safety. Be sure to make and go to all appointments, and call your doctor if you are having problems. It's also a good idea to know your test results and keep a list of the medicines you take.  Where can you learn more?  Go to https://www.Syntasia.net/patiented  Enter E868 in the search box to learn more about \"Learning About Pregnancy.\"  Current as of: November 9, 2022               Content Version: 13.7    8399-4975 [employer].   Care instructions adapted under license by your healthcare professional. If you have questions about a medical condition or this instruction, always ask your healthcare professional. [employer] disclaims any warranty or liability for your use of this information.      Weeks 6 to 10 of Your Pregnancy: Care Instructions  During these weeks of pregnancy, your body goes through many changes. You may start to feel different, both in your body and your emotions. Each pregnancy is different, so there's no \"right\" way to feel. These early weeks are a time to make healthy choices for you and your pregnancy.    Take a daily prenatal vitamin. Choose one with folic acid in it.   Avoid alcohol, tobacco, and drugs (including marijuana). If you need help quitting, talk to your doctor.     Drink plenty of liquids.  Be sure to drink enough water. And limit sodas, other sweetened drinks, and caffeine.     Choose foods that are good sources of calcium, iron, and folate.  You can try dairy products, dark leafy greens, fortified orange juice and cereals, almonds, broccoli, dried fruit, and beans.     Avoid foods that may be harmful.  Don't eat raw meat, deli meat, raw seafood, or raw eggs. Avoid soft cheese and unpasteurized dairy, like Brie and blue cheese. And don't eat fish that " "contains a lot of mercury, like shark and swordfish.     Don't touch taylor litter or cat poop.  They can cause an infection that could be harmful during pregnancy.     Avoid things that can make your body too hot.  For example, avoid hot tubs and saunas.     Soothe morning sickness.  Try eating 5 or 6 small meals a day, getting some fresh air, or using nitesh to control symptoms.     Ask your doctor about flu and COVID-19 shots.  Getting them can help protect against infection.   Follow-up care is a key part of your treatment and safety. Be sure to make and go to all appointments, and call your doctor if you are having problems. It's also a good idea to know your test results and keep a list of the medicines you take.  Where can you learn more?  Go to https://[email or web address] Networks/patiented  Enter G112 in the search box to learn more about \"Weeks 6 to 10 of Your Pregnancy: Care Instructions.\"  Current as of: November 9, 2022               Content Version: 13.7 2006-2023 [employer].   Care instructions adapted under license by your healthcare professional. If you have questions about a medical condition or this instruction, always ask your healthcare professional. [employer] disclaims any warranty or liability for your use of this information.         Managing Morning Sickness (01:55)  Your health professional recommends that you watch this short online health video.  Learn tips for dealing with morning sickness, no matter what time of day you have it.  Purpose:  Gives tips for managing morning sickness, including eating small low-fat meals and avoiding caffeine and spicy food.  Goal:  The user will learn tips for dealing with morning sickness during pregnancy.     How to watch the video    Scan the QR code   OR Visit the website    [email or web address]   Current as of: November 9, 2022               Content Version: 13.7 2006-2023 [employer].   Care instructions " adapted under license by your healthcare professional. If you have questions about a medical condition or this instruction, always ask your healthcare professional. [employer], [employer] disclaims any warranty or liability for your use of this information.      Pregnancy and Heartburn: Care Instructions  Overview     Heartburn is a common problem during pregnancy.  Heartburn happens when stomach acid backs up into the tube that carries food to the stomach. This tube is called the esophagus. Early in pregnancy, heartburn is caused by hormone changes that slow down digestion. Later on, it's also caused by the large uterus pushing up on the stomach.  Even though you can't fix the cause, there are things you can do to get relief. Treating heartburn during pregnancy focuses first on making lifestyle changes, like changing what and how you eat, and on taking medicines.  Heartburn usually improves or goes away after childbirth.  Follow-up care is a key part of your treatment and safety. Be sure to make and go to all appointments, and call your doctor if you are having problems. It's also a good idea to know your test results and keep a list of the medicines you take.  How can you care for yourself at home?  Eat small, frequent meals.  Avoid foods that make your symptoms worse, such as chocolate, peppermint, and spicy foods. Avoid drinks with caffeine, such as coffee, tea, and sodas.  Avoid bending over or lying down after meals.  Take a short walk after you eat.  If heartburn is a problem at night, do not eat for 2 hours before bedtime.  Take antacids like Mylanta, Maalox, Rolaids, or Tums. Do not take antacids that have sodium bicarbonate, magnesium trisilicate, or aspirin. Be careful when you take over-the-counter antacid medicines. Many of these medicines have aspirin in them. While you are pregnant, do not take aspirin or medicines that contain aspirin unless your doctor says it is okay.  If you're not getting  "relief, talk to your doctor. You may be able to take a stronger acid-reducing medicine.  When should you call for help?   Call your doctor now or seek immediate medical care if:    You have new or worse belly pain.     You are vomiting.   Watch closely for changes in your health, and be sure to contact your doctor if:    You have new or worse symptoms of reflux.     You are losing weight.     You have trouble or pain swallowing.     You do not get better as expected.   Where can you learn more?  Go to https://www.[email or web address].net/patiented  Enter U946 in the search box to learn more about \"Pregnancy and Heartburn: Care Instructions.\"  Current as of: November 9, 2022               Content Version: 13.7 2006-2023 [employer].   Care instructions adapted under license by your healthcare professional. If you have questions about a medical condition or this instruction, always ask your healthcare professional. [employer] disclaims any warranty or liability for your use of this information.      Constipation: Care Instructions  Overview     Constipation means that you have a hard time passing stools (bowel movements). People pass stools from 3 times a day to once every 3 days. What is normal for you may be different. Constipation may occur with pain in the rectum and cramping. The pain may get worse when you try to pass stools. Sometimes there are small amounts of bright red blood on toilet paper or the surface of stools. This is because of enlarged veins near the rectum (hemorrhoids).  A few changes in your diet and lifestyle may help you avoid ongoing constipation. Your doctor may also prescribe medicine to help loosen your stool.  Some medicines can cause constipation. These include pain medicines and antidepressants. Tell your doctor about all the medicines you take. Your doctor may want to make a medicine change to ease your symptoms.  Follow-up care is a key part of your treatment and " "safety. Be sure to make and go to all appointments, and call your doctor if you are having problems. It's also a good idea to know your test results and keep a list of the medicines you take.  How can you care for yourself at home?  Drink plenty of fluids. If you have kidney, heart, or liver disease and have to limit fluids, talk with your doctor before you increase the amount of fluids you drink.  Include high-fiber foods in your diet each day. These include fruits, vegetables, beans, and whole grains.  Get at least 30 minutes of exercise on most days of the week. Walking is a good choice. You also may want to do other activities, such as running, swimming, cycling, or playing tennis or team sports.  Take a fiber supplement, such as Citrucel or Metamucil, every day. Read and follow all instructions on the label.  Schedule time each day for a bowel movement. A daily routine may help. Take your time having a bowel movement, but don't sit for more than 10 minutes at a time. And don't strain too much.  Support your feet with a small step stool when you sit on the toilet. This helps flex your hips and places your pelvis in a squatting position.  Your doctor may recommend an over-the-counter laxative to relieve your constipation. Examples are Milk of Magnesia and MiraLax. Read and follow all instructions on the label. Do not use laxatives on a long-term basis.  When should you call for help?   Call your doctor now or seek immediate medical care if:    You have new or worse belly pain.     You have new or worse nausea or vomiting.     You have blood in your stools.   Watch closely for changes in your health, and be sure to contact your doctor if:    Your constipation is getting worse.     You do not get better as expected.   Where can you learn more?  Go to https://www.healthwise.net/patiented  Enter P343 in the search box to learn more about \"Constipation: Care Instructions.\"  Current as of: March 22, " "2023               Content Version: 13.7 2006-2023 [employer].   Care instructions adapted under license by your healthcare professional. If you have questions about a medical condition or this instruction, always ask your healthcare professional. [employer] disclaims any warranty or liability for your use of this information.      Learning About High-Iron Foods  What foods are high in iron?     The foods you eat contain nutrients, such as vitamins and minerals. Iron is a nutrient. Your body needs the right amount to stay healthy and work as it should. You can use the list below to help you make choices about which foods to eat.  Here are some foods that contain iron. They have 1 to 2 milligrams of iron per serving.  Fruits  Figs (dried), 5 figs  Vegetables  Asparagus (canned), 6 herrera  [place], beet, Swiss chard, or turnip greens, 1 cup  Dried peas, cooked,   cup  Seaweed, spirulina (dried),   cup  Spinach, (cooked)   cup or (raw) 1 cup  Grains  Cereals, fortified with iron, 1 cup  Grits (instant, cooked), fortified with iron,   cup  Meats and other protein foods  Beans (kidney, lima, navy, white), canned or cooked,   cup  Beef or lamb, 3 oz  Chicken giblets, 3 oz  Chickpeas (garbanzo beans),   cup  Liver of beef, lamb, or pork, 3 oz  Oysters (cooked), 3 oz  Sardines (canned), 3 oz  Soybeans (boiled),   cup  Tofu (firm),   cup  Work with your doctor to find out how much of this nutrient you need. Depending on your health, you may need more or less of it in your diet.  Where can you learn more?  Go to https://www.healthAuvik [email or web address]/patiented  Enter R005 in the search box to learn more about \"Learning About High-Iron Foods.\"  Current as of: March 1, 2023               Content Version: 13.7 2006-2023 [employer].   Care instructions adapted under license by your healthcare professional. If you have questions about a medical condition or this instruction, always ask your " "healthcare professional. [employer], [facility] disclaims any warranty or liability for your use of this information.      Rh Antibodies Screening During Pregnancy: About This Test  What is it?     The Rh antibodies screening test is a blood test. It checks your blood for Rh antibodies. If you have Rh-negative blood and have been exposed to Rh-positive blood, your immune system may make antibodies to attack the Rh-positive blood. When a pregnant woman has these antibodies, it is called Rh sensitization.  Why is this test done?  The Rh antibodies screening test is done during pregnancy to find out if your baby is at risk for Rh disease. This can happen if you have Rh-negative blood and your baby has Rh-positive blood. If your Rh-negative blood mixes with Rh-positive blood, your immune system will make antibodies to attack the Rh-positive blood.  During pregnancy, these antibodies could attach to the baby's red blood cells. This can cause your baby to have serious health problems. The results of this test will help your doctor know how to best care for you and your baby during your pregnancy.  How do you prepare for the test?  In general, there's nothing you have to do before this test, unless your doctor tells you to.  How is the test done?  A health professional uses a needle to take a blood sample, usually from the arm.  What happens after the test?  You will probably be able to go home right away. It depends on the reason for the test.  You can go back to your usual activities right away.  Follow-up care is a key part of your treatment and safety. Be sure to make and go to all appointments, and call your doctor if you are having problems. It's also a good idea to keep a list of the medicines you take. Ask your doctor when you can expect to have your test results.  Where can you learn more?  Go to https://www.TrackerSphere.net/patiented  Enter P722 in the search box to learn more about \"Rh Antibodies Screening " "During Pregnancy: About This Test.\"  Current as of: 2022               Content Version: 13.7    1709-7891 [employer].   Care instructions adapted under license by your healthcare professional. If you have questions about a medical condition or this instruction, always ask your healthcare professional. [employer] disclaims any warranty or liability for your use of this information.      Learning About Preventing Rh Disease  What is Rh disease?     Rh disease can be a serious problem in pregnancy. It happens when substances called antibodies in the mother's blood cause red blood cells in her baby's blood to be destroyed. This can occur when the blood types of a mother and her baby do not match.  All blood has an Rh factor. This is what makes a blood type positive or negative. When you are Rh-negative, your baby may be Rh-negative or Rh-positive. If your baby has Rh-positive blood and it mixes with yours, your body will make antibodies. This is called Rh sensitization.  Most of the time, this is not a problem in a first pregnancy. But in future pregnancies, it could cause Rh disease.  A  with Rh disease has mild anemia and may have jaundice. In severe cases, anemia, jaundice, and swelling can be very dangerous or fatal. Some babies need to be delivered early. Some need special care in the NICU. A very sick baby will need a blood transfusion before or after birth.  Fortunately, Rh sensitization is usually easy to prevent.  That's why it's important to get your Rh status checked in your first trimester. It doesn't cause any warning signs. A blood test is the only way to know if you are Rh-sensitive or are at risk for it.  How can you prevent Rh disease?  If you are Rh-negative, your doctor gives you an Rh immune globulin shot (such as RhoGAM). It helps prevent your body from making the antibodies that attack your baby's red blood cells.  Timing is important. You need the " "shot at certain times during your pregnancy. And you need one anytime there is a chance that your baby's blood might mix with yours. That can happen with certain prenatal tests or when you have pregnancy bleeding, such as:  Right after any pregnancy loss, amniocentesis, or CVS testing.  After turning of a breech baby.  Before and maybe after childbirth. Your doctor gives you a shot around week 28. If your  is Rh-positive, you will have another shot.  Follow-up care is a key part of your treatment and safety. Be sure to make and go to all appointments, and call your doctor if you are having problems. It's also a good idea to know your test results and keep a list of the medicines you take.  Where can you learn more?  Go to https://www.AdTapsy.Mirror42/patiented  Enter W177 in the search box to learn more about \"Learning About Preventing Rh Disease.\"  Current as of: 2022               Content Version: 13.7    2558-7403 [employer].   Care instructions adapted under license by your healthcare professional. If you have questions about a medical condition or this instruction, always ask your healthcare professional. [employer] disclaims any warranty or liability for your use of this information.      Learning About Rh Immunoglobulin Shots  Introduction     An Rh immunoglobulin shot is given to pregnant women who have Rh-negative blood.  You may have Rh-negative blood, and your baby may have Rh-positive blood. If the two types of blood mix, your body will make antibodies. This is called Rh sensitization. Most of the time, this is not a problem the first time you're pregnant. But it could cause problems in future pregnancies.  This shot keeps your body from making the antibodies. You get the shot around 28 weeks of pregnancy. After the birth, your baby's blood is tested. If the blood is Rh positive, you will get another shot. You may also get the shot if you have vaginal bleeding " "while you are pregnant or if you have a miscarriage. These shots protect future pregnancies.  Women with Rh negative blood will need this shot each time they get pregnant.  Example  Rh immunoglobulin (HypRho-D, MICRhoGAM, and RhoGAM)  Possible side effects  Rare side effects may include:  Some mild pain where you got the shot.  A slight fever.  An allergic reaction.  You may have other side effects not listed here. Check the information that comes with your medicine.  What to know about taking this medicine  You may need more than one shot. You may need the shot again:  After amniocentesis, fetal blood sampling, or chorionic villus sampling tests.  If you have bleeding in your second or third trimester.  After turning of a breech baby.  After an injury to the belly while you are pregnant.  After a miscarriage or an .  Before or right after treatment for an ectopic or a partial molar pregnancy.  Tell your doctor if you have any allergies or have had a bad response to medicines in the past.  If you get this shot within 3 months of getting a live-virus vaccine, the vaccine may not work. Your doctor will tell you if you need more vaccine.  Check with your doctor or pharmacist before you use any other medicines. This includes over-the-counter medicines. Make sure your doctor knows all of the medicines, vitamins, herbs, and supplements you take. Taking some medicines at the same time can cause problems.  Where can you learn more?  Go to https://www.[address].net/patiented  Enter V615 in the search box to learn more about \"Learning About Rh Immunoglobulin Shots.\"  Current as of: 2022               Content Version: 13.7    9933-2940 [employer].   Care instructions adapted under license by your healthcare professional. If you have questions about a medical condition or this instruction, always ask your healthcare professional. [employer] disclaims any warranty or liability for " your use of this information.      Rubella (American Measles): Care Instructions  Overview  Rubella, also called American measles or 3-day measles, is a disease caused by a virus. It spreads by coughs, sneezes, and close contact. Rubella usually is mild and does not cause long-term problems. But if you are pregnant and get it, you can give the disease to your unborn baby. This can cause serious birth defects.  While you have rubella, you may get a rash and a mild fever, and the lymph glands in your neck may swell. Older children often have a fever, eye pain, a sore throat, and body aches. You can relieve most symptoms with care at home. Avoid being around others, especially pregnant people, until your rash has been gone for at least 4 days. People who have not had this disease before or have not had the vaccine have the greatest chance of getting the virus.  Follow-up care is a key part of your treatment and safety. Be sure to make and go to all appointments, and call your doctor if you are having problems. It's also a good idea to know your test results and keep a list of the medicines you take.  How can you care for yourself at home?  Drink plenty of fluids. If you have kidney, heart, or liver disease and have to limit fluids, talk with your doctor before you increase the amount of fluids you drink.  Get plenty of rest to help your body heal.  Take an over-the-counter pain medicine, such as acetaminophen (Tylenol), ibuprofen (Advil, Motrin), or naproxen (Aleve), to reduce fever and discomfort. Read and follow all instructions on the label. Do not give aspirin to anyone younger than 20. It has been linked to Reye syndrome, a serious illness.  Do not take two or more pain medicines at the same time unless the doctor told you to. Many pain medicines have acetaminophen, which is Tylenol. Too much acetaminophen (Tylenol) can be harmful.  Try not to scratch the rash. Put cold, wet cloths on the rash to reduce itching.  Do  "not smoke. Smoking can make your symptoms worse. If you need help quitting, talk to your doctor about stop-smoking programs and medicines. These can increase your chances of quitting for good.  Avoid contact with people who have never had rubella and who have not been immunized.  When should you call for help?   Call your doctor now or seek immediate medical care if:    You have a fever with a stiff neck or a severe headache.     You are sensitive to light or feel very sleepy or confused.   Watch closely for changes in your health, and be sure to contact your doctor if:    You do not get better as expected.   Where can you learn more?  Go to https://www.ME[employer] Real-[employer].net/patiented  Enter B812 in the search box to learn more about \"Rubella (Faroese Measles): Care Instructions.\"  Current as of: 2022               Content Version: 13.7    5371-0064 [employer]   Care instructions adapted under license by your healthcare professional. If you have questions about a medical condition or this instruction, always ask your healthcare professional. [employer] disclaims any warranty or liability for your use of this information.      Gonorrhea and Chlamydia: About These Tests  What is it?  These tests use a sample of urine or other body fluid to look for the bacteria that cause these sexually transmitted infections (STIs). The fluid sample can come from the cervix, vagina, rectum, throat, or eyes.  Why is this test done?  These tests may be done to:  Find out if symptoms are caused by gonorrhea or chlamydia.  Check people who are at high risk of being infected with gonorrhea or chlamydia.  Retest people several months after they have been treated for gonorrhea or chlamydia.  Check for infection in your  if you had a gonorrhea or chlamydia infection at the time of delivery.  How can you prepare for the test?  If you are going to have a urine test, do not urinate for at least 1 hour " "before the test.  If you think you may have chlamydia or gonorrhea, don't have sexual intercourse until you get your test results. And you may want to have tests for other STIs, such as HIV.  How is the test done?  For a direct sample, a swab is used to collect body fluid from the cervix, vagina, rectum, throat, or eyes. Your doctor may collect the sample. Or you may be given instructions on how to collect your own sample.  For a urine sample, you will collect the urine that comes out when you first start to urinate. Don't wipe the genital area clean before you urinate.  How long does the test take?  The test will take a few minutes.  What happens after the test?  You will be able to go home right away.  You can go back to your usual activities right away.  If you do have an infection, don't have sexual intercourse for 7 days after you start treatment. And your sex partner(s) should also be treated.  Follow-up care is a key part of your treatment and safety. Be sure to make and go to all appointments, and call your doctor if you are having problems. It's also a good idea to keep a list of the medicines you take. Ask your doctor when you can expect to have your test results.  Where can you learn more?  Go to https://www.[email or web address].net/patiented  Enter K976 in the search box to learn more about \"Gonorrhea and Chlamydia: About These Tests.\"  Current as of: August 2, 2022               Content Version: 13.7    8075-0452 [employer].   Care instructions adapted under license by your healthcare professional. If you have questions about a medical condition or this instruction, always ask your healthcare professional. [employer] disclaims any warranty or liability for your use of this information.      Trichomoniasis: About This Test  What is it?     This test uses a sample of urine or other body fluid to look for the tiny parasite that causes trichomoniasis (also called trich). The fluid sample " can come from the vagina, cervix, or urethra. Your doctor may choose to use one or more of many available tests.  Why is it done?  A trich test may be done to:  Find out if symptoms are caused by trich.  Check people who are at high risk for being infected with trich.  Check after treatment to make sure that the infection is gone.  How do you prepare for the test?  If you are going to have a urine test, do not urinate for at least 1 hour before the test.  How is the test done?  For a direct sample, a swab is used to collect body fluid from the cervix, vagina, or urethra. Your doctor may collect the sample. Or you may be given instructions on how to collect your own sample.  For a urine sample, you will collect the urine that comes out when you first start to urinate. Don't wipe the area clean before you urinate.  How long does the test take?  It will take a few minutes to collect a sample.  What happens after the test?  You can go home right away.  You can go back to your usual activities right away.  You may get the test results the same day or several days later. It depends on the test used.  If you do have an infection, don't have sexual intercourse for 7 days after you start treatment. Your sex partner(s) should also be treated.  Follow-up care is a key part of your treatment and safety. Be sure to make and go to all appointments, and call your doctor if you are having problems. Ask your doctor when you can expect to have your test results.  Current as of: August 2, 2022               Content Version: 13.7    8652-0965 [employer].   Care instructions adapted under license by your healthcare professional. If you have questions about a medical condition or this instruction, always ask your healthcare professional. [employer] disclaims any warranty or liability for your use of this information.      HIV Testing: Care Instructions  Overview     You can get tested for the human  "immunodeficiency virus (HIV). This test checks for HIV antibodies and antigens in your blood. If they are found, the test is positive.  If HIV antibodies or antigens are not found, you may need a repeat test to be sure the results are correct. Or your doctor may want to do a different test.  Follow-up care is a key part of your treatment and safety. Be sure to make and go to all appointments, and call your doctor if you are having problems. It's also a good idea to know your test results and keep a list of the medicines you take.  What do the results mean?  Normal result  A normal result means that no HIV antibodies or antigens were found in your blood. And if you had a test that checked for HIV RNA or DNA, none was found. Normal results are called negative.  You may need more testing to be sure the test results are correct.  Uncertain result  Test results don't clearly show whether you have an HIV infection. This is usually called an indeterminate result. This may happen before HIV antibodies or antigens develop. Or it may happen when some other type of antibody or antigen interferes with the results. If this occurs, you will probably have another test right away.  Abnormal result  An abnormal result means that you have HIV antibodies or antigens in your blood. These results are called positive.  A positive test will be confirmed by another type of test. This is because some tests can cause false-positive results. No one is considered HIV-positive until the result is confirmed by a test that shows HIV RNA or DNA in the person's blood.  If your test result is positive, your doctor will talk to you about starting treatment.  Where can you learn more?  Go to https://www.LIANAI.net/patiented  Enter T792 in the search box to learn more about \"HIV Testing: Care Instructions.\"  Current as of: October 31, 2022               Content Version: 13.7    2423-0294 [employer]   Care instructions adapted under " license by your healthcare professional. If you have questions about a medical condition or this instruction, always ask your healthcare professional. [employer] disclaims any warranty or liability for your use of this information.      Hepatitis C Virus Tests: About These Tests  What are they?     Hepatitis C virus tests are blood tests that check for substances in the blood that show whether you have hepatitis C now or had it in the past. The tests can also tell you what type of hepatitis C you have and how severe the disease is. This can help your doctor with treatment.  If the tests show that you have long-term hepatitis C, you need to take steps to prevent spreading the disease.  Why are these tests done?  You may need these tests if:  You have symptoms of hepatitis.  You may have been exposed to the virus. You are more likely to have been exposed to the virus if you inject drugs or are exposed to body fluids (such as if you are a health care worker).  You've had other tests that show you have liver problems.  You are 18 to 79 years old.  You have an HIV infection.  The tests also are done to help your doctor decide about your treatment and see how well it works.  How do you prepare for the test?  In general, there's nothing you have to do before this test, unless your doctor tells you to.  How is the test done?  A health professional uses a needle to take a blood sample, usually from the arm.  What happens after these tests?  You will probably be able to go home right away.  You can go back to your usual activities right away.  Follow-up care is a key part of your treatment and safety. Be sure to make and go to all appointments, and call your doctor if you are having problems. It's also a good idea to keep a list of the medicines you take. Ask your doctor when you can expect to have your test results.  Where can you learn more?  Go to https://www.TargAnox.net/patiented  Enter W551 in the search  "box to learn more about \"Hepatitis C Virus Tests: About These Tests.\"  Current as of: October 31, 2022               Content Version: 13.7    3958-0856 [employer].   Care instructions adapted under license by your healthcare professional. If you have questions about a medical condition or this instruction, always ask your healthcare professional. [employer] disclaims any warranty or liability for your use of this information.      Learning About Fetal Ultrasound Results  What is a fetal ultrasound?     Fetal ultrasound is a test that lets your doctor see an image of your baby. Your doctor learns information about your baby from this picture. You may find out, for example, if you are having a boy or a girl. But the main reason you have this test is to get information about your baby's health.  (You may hear your baby called a fetus. This is a common medical term for a baby that's growing in the mother's uterus.)  What kind of information can you learn from this test?  The findings of an ultrasound fall into two categories, normal and abnormal.  Normal  The fetus is the right size for its age.  The placenta is the expected size and does not cover the cervix.  There is enough amniotic fluid in the uterus.  No birth defects can be seen.  Abnormal  The fetus is small or large for its age.  The placenta covers the cervix.  There is too much or too little amniotic fluid in the uterus.  The fetus may have a birth defect.  What does an abnormal result mean?  Abnormal seems to imply that something is wrong with your baby. But what it means is that the test has shown something the doctor wants to take a closer look at.  And that's what happens next. Your doctor will talk to you about what further test or tests you may need.  What do the results mean?  Some of the things your doctor may see on an abnormal ultrasound include:  Echogenic bowel.  The bowel looks very bright on the screen. This could " mean that there's blood in the bowel. Or it could mean that something is blocking the small bowel.  Increased nuchal translucency.  The ultrasound measures the thickness at the back of the baby's neck. An increase in thickness is sometimes an early sign of Down syndrome.  Increased or decreased amniotic fluid.  The doctor will look for a reason for the level of amniotic fluid and will watch the pregnancy closely as it progresses.  Large ventricles.  Ventricles in the brain look larger than they should. Your doctor may take a closer look at the brain.  Renal pyelectasis/hydronephrosis.  The ultrasound measures the fluid around the kidney. If there is more fluid than expected, there is a chance of urinary tract or kidney problems.  Short long bones.  The ultrasound measures certain arm and leg bones. A long bone (humerus or femur) that is shorter than average could be a sign of Down syndrome.  Subchorionic hemorrhage.  An ultrasound can show bleeding under one of the membranes that surrounds the fetus. Some women don't have symptoms of bleeding. The ultrasound can find this problem when women are not bleeding from their vagina. Women who have this condition have a slightly higher chance of miscarriage.  What do you do now?  Take a deep breath, and let it out. Keep in mind that an abnormal finding on an ultrasound, after it's coupled with more information, may:  Turn out to be nothing.  Turn out to be something mild that won't affect the baby.  Turn out to be something more serious. But if this happens, early diagnosis helps you and your doctor plan treatment options sooner rather than later.  Your medical team is there for you. So are your family and friends. Ask questions, and get the help and support you need.  Follow-up care is a key part of your treatment and safety. Be sure to make and go to all appointments, and call your doctor if you are having problems. It's also a good idea to know your test results and keep  "a list of the medicines you take.  Where can you learn more?  Go to https://www.[employer].net/patiented  Enter K451 in the search box to learn more about \"Learning About Fetal Ultrasound Results.\"  Current as of: November 9, 2022               Content Version: 13.7    5642-8340 [email or web address].   Care instructions adapted under license by your healthcare professional. If you have questions about a medical condition or this instruction, always ask your healthcare professional. [email or web address] disclaims any warranty or liability for your use of this information.      Learning About Prenatal Visits  Your Care Instructions     Regular prenatal visits are very important during any pregnancy. These quick office visits may seem simple and routine. But they can help you and your baby stay healthy. Your doctor is watching for problems that can only be found by regularly checking you and your baby. The visits also give you and your doctor time to build a good relationship.  Many women have prenatal visits every 4 to 6 weeks until week 28 of pregnancy. Then the visits become more frequent. This is often every 2 to 3 weeks through week 36 of pregnancy. In the final month of pregnancy, you likely will see your doctor every week. You may have a different schedule if you have a medical problem or are a teen.  At different times in your pregnancy, you will have exams and tests. Some are routine. Others are done only when there is a chance of a problem. Everything healthy you do for your body helps your growing baby. Rest when you need it. Eat well, drink plenty of water, and exercise regularly.  What happens during a prenatal visit?  You will have blood pressure checks, along with urine tests. You also may have blood tests. If you need to go to the bathroom while waiting for the doctor, tell the nurse. He or she will give you a sample cup so your urine can be tested.  You will be weighed and have your belly " measured.  Your doctor may listen to your baby's heartbeat with a special stethoscope.  In your second trimester, your doctor will check your blood sugar (glucose tolerance test) for diabetes that can occur during pregnancy. This is gestational diabetes, which can harm your baby.  You will have tests to check for infections that could harm your . These include group B streptococcus and hepatitis B.  Your doctor may do ultrasounds to check for problems. This also checks your baby's position. An ultrasound uses sound waves to produce a picture of your baby.  You may have other tests at any time during your pregnancy.  Use your visits to discuss with your doctor any concerns you have.  How can you care for yourself at home?  Get plenty of rest.  Exercise every day, if your doctor says it is okay. If you have not exercised in the past, start out slowly. Take many short walks each day.  Eat a balanced diet. Make sure your diet includes plenty of beans, peas, and leafy green vegetables.  Drink plenty of fluids. Cut down on drinks with caffeine, such as coffee, tea, and cola. If you have kidney, heart, or liver disease and have to limit fluids, talk with your doctor before you increase the amount of fluids you drink.  Avoid chemical fumes, paint fumes, and poisons. Do not use alcohol, marijuana, or illegal drugs. Do not smoke, vape, or use tobacco. If you need help quitting, talk to your doctor about stop-smoking programs and medicines. These can increase your chances of quitting for good.  Review all of your medicines with your doctor. Some of your routine medicines may need to be changed to protect your baby. Do not stop or start taking any medicines without talking to your doctor first.  Follow-up care is a key part of your treatment and safety. Be sure to make and go to all appointments, and call your doctor if you are having problems. It's also a good idea to know your test results and keep a list of the  "medicines you take.  Where can you learn more?  Go to https://www.healthCodaMation.net/patiented  Enter J502 in the search box to learn more about \"Learning About Prenatal Visits.\"  Current as of: November 9, 2022               Content Version: 13.7    9645-7740 [employer].   Care instructions adapted under license by your healthcare professional. If you have questions about a medical condition or this instruction, always ask your healthcare professional. [employer] disclaims any warranty or liability for your use of this information.      Domestic Abuse: Care Instructions  Overview     If you want to save this information but don't think it is safe to take it home, see if a trusted friend can keep it for you. Plan ahead. Know who you can call for help, and memorize the phone number.   Be careful online too. Your online activity may be seen by others. Do not use your personal computer or device to read about this topic. Use a safe computer such as one at work, a friend's house, or a library.    Domestic abuse is different from an argument now and then. It is a pattern of abuse that one person uses to control another person's behavior. It may start with threats and name-calling. Then, it may lead to more serious acts, like pushing and slapping. The abuse also may occur in other areas. For example, the abuser may withhold money or spend a partner's money without their knowledge.  Abuse can cause serious harm. You are more likely to have a long-term health problem from the injuries and stress of living in a violent relationship. People who are sexually abused by their partners have more sexually transmitted infections and unwanted pregnancies. Anyone can be abused in relationships. Anyone who is abused also faces emotional pain.  If you are pregnant, abuse can cause problems such as poor weight gain, infections, and bleeding. Abuse during this time may increase your baby's risk of low birth " "weight, premature birth, and death.  Follow-up care is a key part of your treatment and safety. Be sure to make and go to all appointments, and call your doctor if you are having problems. It's also a good idea to know your test results and keep a list of the medicines you take.  How can you care for yourself at home?  If you do not have a safe place to stay, discuss this with your doctor before you leave.  Have a plan for where to go, how to leave your house, and where to stay in case of an emergency. Do not tell your partner about your plan. Contact:  The National Domestic Violence Hotline toll-free at 1-661.307.8925. They can help you find resources in your area.  Your local police department, hospital, or clinic for information about shelters and safe homes near you.  Talk to a trusted friend or neighbor or a [employer] counselor. Do not feel that you have to hide what happened.  Teach your children how to call for help in an emergency.  Be alert to warning signs, such as threats, heavy alcohol use, or drug use. This can help you avoid danger.  If you can, make sure that there are no guns or other weapons in the house.  When should you call for help?   Call 911 anytime you think you may need emergency care. For example, call if:    You or someone else has just been abused.     You think you or someone else is in danger of being abused.   Watch closely for changes in your health, and be sure to contact your doctor if you have any problems.  Where can you learn more?  Go to https://www.[employer].net/patiented  Enter G282 in the search box to learn more about \"Domestic Abuse: Care Instructions.\"  Current as of: February 27, 2023               Content Version: 13.7    1220-0484 [employer]   Care instructions adapted under license by your healthcare professional. If you have questions about a medical condition or this instruction, always ask your healthcare professional. [employer] " disclaims any warranty or liability for your use of this information.      Domestic Violence Safety Instructions: Care Instructions  Overview     If you want to save this information but don't think it is safe to take it home, see if a trusted friend can keep it for you. Plan ahead. Know who you can call for help, and memorize the phone number.   Be careful online too. Your online activity may be seen by others. Do not use your personal computer or device to read about this topic. Use a safe computer such as one at work, a friend's house, or a library.    When you are abused by a spouse or partner, you can take actions to protect yourself and your children.  You can increase your safety whether you decide to stay with your spouse or partner or you decide to leave. You can also prepare an action plan and kit ahead of time. This will allow you to leave quickly when you decide to. Remember, you cannot stop your partner's abuse, but you can find help for you and your children. No one deserves to be abused.  Follow-up care is a key part of your treatment and safety. Be sure to make and go to all appointments, and call your doctor if you are having problems. It's also a good idea to know your test results and keep a list of the medicines you take.  How can you care for yourself at home?  Make a plan for your safety   If you decide to stay with your abusive spouse or partner, you can do the following to increase your safety:  Decide what works best to keep you safe in an emergency.  Decide who you can call to help you in an emergency.  Decide if you will call the police if you get hurt again. If you can, agree on a signal with your children or neighbor to call the police for you if you need help. You can flash lights or hang something out of a window.  Choose a place to go for a short time if you need to leave home. Memorize the address and phone number.  Learn escape routes out of your home in case you need to leave in a  hurry. Teach your children different ways to get out of the house quickly if they need to.  Take objects that can be used as weapons (guns, knives, hammers) and hide them or lock them up.  Learn the number of a domestic violence shelter. Talk to the people there about how they can help.  Find out about other community resources that can help you.  Take pictures of bruises or other injuries if you can. You can also take pictures of things your abuser has broken.  Teach your children that violence is never okay. Tell them that they do not deserve to be hurt.  Pack a bag   Prepare a kit with things you will need if you leave the house suddenly. You can try to hide this in your house, or you can leave it with a friend or relative you can trust. You should include the following items in the kit:  A set of keys to your house and car.  Emergency phone numbers and addresses. You might also want to have a map and a small flashlight in case you need to leave in the night.  Money such as cash or checks. You can also ask a trusted friend or family member to hold money for you.  Copies of legal documents such as house and car titles or rent receipts, birth certificates, Social Security card, voter registration, marriage and 's licenses, and your children's health records.  Personal items you would need for a few days, such as clothes, a toothbrush, toothpaste, and any medicines you or your children need.  A favorite toy or book for your child or children.  Diapers and bottles, if you have very young children.  Pictures that show signs of abuse and violence. You may also add pictures of your abuser.  If you leave   If you decide to leave, you can take the following steps:  Go to the emergency room at a hospital if you have been hurt.  Ask the police to be with you as you leave. They can protect you as you leave the house.  If you decide to leave secretly, remember that activities can be tracked. Your abuser may still have  "access to your cell phone, email, and credit cards. It may be possible for these to be traced. Always be aware of your surroundings.  Take the kit you have prepared. If this is an emergency, do not worry about gathering up anything. Just leave--your safety is most important.  If your abuser moves out, change the locks on the doors. If you have a security system, change the access code.  When should you call for help?   Call 911 anytime you think you may need emergency care. For example, call if:    You or someone else has just been abused.     You think you or someone else is in danger of being abused.   Watch closely for changes in your health, and be sure to contact your doctor if you have any problems.  Where can you learn more?  Go to https://www.[email or web address].net/patiented  Enter A752 in the search box to learn more about \"Domestic Violence Safety Instructions: Care Instructions.\"  Current as of: October 20, 2022               Content Version: 13.7    4342-5076 [employer].   Care instructions adapted under license by your healthcare professional. If you have questions about a medical condition or this instruction, always ask your healthcare professional. [employer] disclaims any warranty or liability for your use of this information.      Learning About Domestic Abuse  What is domestic abuse?  Domestic abuse is threats or violent behavior in a personal relationship. It can happen between past or current partners or spouses. It's also called domestic violence or intimate partner violence.  Domestic abuse can affect people of any ethnic group, race, or Sikh. It can affect teens, adults, or the elderly. And it can happen to people of any sexual identity or social status. But most abuse victims are women.  Abusers use fear, bullying, and threats to control their partners. They control what their partners do, where they go, or who they see. They may act jealous, controlling, or " possessive. These early signs of abuse may happen soon after the start of the relationship. Sometimes it can be hard to notice abuse at first. But after the relationship becomes more serious, the abuse may get worse.  If you are being abused in your relationship, it's important to get help. The abuse is not your fault, and you don't have to face it alone.  Be careful  It may not be safe to take home domestic abuse information like this handout. Some people ask a trusted friend to keep it for them. It's also important to plan ahead and to memorize the phone number of places you can go for help. If you are concerned about your safety, do not use your computer, smartphone, or tablet to read about domestic abuse.   What are the types of domestic abuse?  Abuse can be emotional, physical, or sexual.  Emotional abuse  Emotional abuse is a pattern of threats, insults, or controlling behavior. It includes verbal abuse. It goes beyond healthy disagreements in a relationship. It's a sign of an unhealthy relationship, and it may be against the law.  Do you feel threatened, intimidated, or controlled? Does your partner threaten your children, other family members, or pets?  Does your partner:  Use jokes meant to embarrass or shame you?  Call you names?  Tell you that you are a bad parent or threaten to take away your children?  Threaten to have you or your family members deported?  Control your access to money or other basic needs?  Control what you do, who you see or talk to, or where you go?  Another form of emotional abuse is denying that it is happening. Or the abuser may act like the abuse is no big deal or is your fault.  Sexual abuse  With sexual abuse, abusers may try to convince or force you to have sex. They may force you into sex acts you're not comfortable with. Or they may sexually assault you. Sexual abuse can happen even if you are in a committed relationship.  Physical abuse  Physical abuse means that a partner  hits, kicks, or physically hurts you. Physical abuse that starts with a slap might lead to kicking, shoving, and choking over time. The abuser may also threaten to hurt or kill you.  What problems can domestic abuse lead to?  Domestic abuse can be very dangerous. It can cause serious, repeated injury. It can even lead to death.  All forms of abuse can cause long-term health problems from the stress of a violent relationship. Verbal abuse can lead to sexual and physical abuse.  Abuse causes emotional pain, depression, anxiety, and post-traumatic stress. Sexual abuse can lead to sexually transmitted infections (including HIV/AIDS) and unplanned pregnancy.  Pregnancy can be a very dangerous time for people in abusive relationships. Pregnant people who are abused may have anemia, infections, bleeding, or poor weight gain. Abuse during this time may also increase your baby's risk of low birth weight, premature birth, and death.  It can be hard for some victims of domestic abuse to ask for help or to leave their relationship. You may feel scared, stuck, or not sure what steps to take. But it's important not to ignore abuse. Talking to someone could be the first step to ending the abuse and taking care of your own health and happiness again.  Where can you get help?  Talk to a trusted friend. Find a local advocacy group, or talk to your doctor about the abuse.  Contact the National Domestic Violence Hotline at 0-137-572-ZQUT (1-285.918.7194) for more safety tips. They can guide you to groups in your area that can help. Or go to the National Coalition Against Domestic Violence website at www.thehotline.org to learn more.  Domestic violence groups or a counselor in your area can help you make a safety plan for yourself and your children.  When to call for help  Call 911 anytime you think you may need emergency care. For example, call if:  You think that you or someone you know is in danger of being abused.  You have been  "hurt and can't have someone safely take you to emergency care.  You have just been abused.  A family member has just been abused.  Where can you learn more?  Go to https://www.[employer].net/patiented  Enter S665 in the search box to learn more about \"Learning About Domestic Abuse.\"  Current as of: February 27, 2023               Content Version: 13.7    8040-4270 [employer].   Care instructions adapted under license by your healthcare professional. If you have questions about a medical condition or this instruction, always ask your healthcare professional. [employer] disclaims any warranty or liability for your use of this information.      Vaginal Bleeding During Pregnancy: Care Instructions  Overview     It's common to have some vaginal spotting when you are pregnant. In some cases, the bleeding isn't serious. And there aren't any more problems with the pregnancy.  But sometimes bleeding is a sign of a more serious problem. This is more common if the bleeding is heavy or painful. Examples of more serious problems include miscarriage, an ectopic pregnancy, and a problem with the placenta.  You may have to see your doctor again to be sure everything is okay. You may also need more tests to find the cause of the bleeding.  Home treatment may be all you need. But it depends on what is causing the bleeding. Be sure to tell your doctor if you have any new symptoms or if your symptoms get worse.  The doctor has checked you carefully, but problems can develop later. If you notice any problems or new symptoms, get medical treatment right away.  Follow-up care is a key part of your treatment and safety. Be sure to make and go to all appointments, and call your doctor if you are having problems. It's also a good idea to know your test results and keep a list of the medicines you take.  How can you care for yourself at home?  If your doctor prescribed medicines, take them exactly as directed. Call " "your doctor if you think you are having a problem with your medicine.  Do not have vaginal sex until your doctor says it's okay.  Do not put anything in your vagina until your doctor says it's okay.  Ask your doctor about other activities you can or can't do.  Get a lot of rest. Being pregnant can make you tired.  Do not use nonsteroidal anti-inflammatory drugs (NSAIDs), such as ibuprofen (Advil, Motrin), naproxen (Aleve), or aspirin, unless your doctor says it is okay.  When should you call for help?   Call 911 anytime you think you may need emergency care. For example, call if:    You passed out (lost consciousness).     You have severe vaginal bleeding. This means you are soaking through a pad each hour for 2 or more hours.     You have sudden, severe pain in your belly or pelvis.   Call your doctor now or seek immediate medical care if:    You have new or worse vaginal bleeding.     You are dizzy or lightheaded, or you feel like you may faint.     You have pain in your belly, pelvis, or lower back.     You think that you are in labor.     You have a sudden release of fluid from your vagina.     You've been having regular contractions for an hour. This means that you've had at least 8 contractions within 1 hour or at least 4 contractions within 20 minutes, even after you change your position and drink fluids.     You notice that your baby has stopped moving or is moving much less than normal.   Watch closely for changes in your health, and be sure to contact your doctor if you have any problems.  Where can you learn more?  Go to https://www.[employer].net/patiented  Enter N829 in the search box to learn more about \"Vaginal Bleeding During Pregnancy: Care Instructions.\"  Current as of: November 9, 2022               Content Version: 13.7    5952-9102 [employer].   Care instructions adapted under license by your healthcare professional. If you have questions about a medical condition or this " instruction, always ask your healthcare professional. [employer] disclaims any warranty or liability for your use of this information.

## 2023-11-17 NOTE — PATIENT INSTRUCTIONS
Weeks 10 to 14 of Your Pregnancy: Care Instructions  It's now possible to hear the fetus's heartbeat with a special ultrasound device. And the fetus's organs are developing.    Decide about tests to check for birth defects. Think about your age, your chance of passing on a family disease, your need to know about any problems, and what you might do after you have the test results.   It's okay to exercise. Try activities such as walking or swimming. Check with your doctor before starting a new program.     You may feel more tired than usual.  Taking naps during the day may help.     You may feel emotional.  It might help to talk to someone.     You may have headaches.  Try lying down and putting a cool cloth over your forehead.     You can use acetaminophen (Tylenol) for pain relief.  Don't take any anti-inflammatory medicines (such as Advil, Motrin, Aleve), unless your doctor says it's okay.     You may feel a fullness or aching in your lower belly.  This can feel like the kind of cramps you might get before a period. A back rub may help.     You may need to urinate more.  Your growing uterus and changing hormones can affect your bladder.     You may feel sick to your stomach (morning sickness).  Try avoiding food and smells that make you feel sick.     Your breasts may feel different.  They may feel tender or get bigger. Your nipples may get darker. Try a bra that gives you good support.     Avoid alcohol, tobacco, and drugs (including marijuana).  If you need help quitting, talk to your doctor.     Take a daily prenatal vitamin.  Choose one with folic acid.   Follow-up care is a key part of your treatment and safety. Be sure to make and go to all appointments, and call your doctor if you are having problems. It's also a good idea to know your test results and keep a list of the medicines you take.  Where can you learn more?  Go to https://www.healthwise.net/patiented  Enter E090 in the search box to learn more  "about \"Weeks 10 to 14 of Your Pregnancy: Care Instructions.\"  Current as of: July 11, 2023               Content Version: 13.8    4162-5818 [employer].   Care instructions adapted under license by your healthcare professional. If you have questions about a medical condition or this instruction, always ask your healthcare professional. [employer] disclaims any warranty or liability for your use of this information.      Understanding Alpha and Beta Thalassemia  What is thalassemia?  Thalassemia [[facility]-yevgeniy-SEE-andrzej-] is a lifelong blood disorder. It is caused by mutations (changes) in the genes that make hemoglobin.   Hemoglobin is a protein in red blood cells that carries oxygen. Hemoglobin is made of 4 smaller protein chains: 2 blocks of alpha chains and 2 blocks of beta chains (see Figure 1). Each block has heme (iron) in the center. Oxygen sticks to the heme, allowing your body to carry it through the bloodstream. The oxygen is delivered to your whole body.  When you have alpha thalassemia, your alpha blocks are smaller or are missing one or both alpha chains. (See Figure 2 for an example.) For beta thalassemia, the beta blocks are smaller or fewer in number.   With either disorder, your body makes smaller hemoglobin and possibly fewer red blood cells to hold hemoglobin (anemia). You may feel very tired or have other problems as a result.  How do you get thalassemia?  There are 4 genes for alpha thalassemia and 2 genes for beta thalassemia. For you to have either alpha or beta thalassemia, both of your parents must carry a gene mutation for the disease and pass it down to you.   It's possible to have more severe or less severe symptoms than your parents. If you get a mutation from only one parent, for example, you won't have symptoms of thalassemia (thalassemia trait)--but you could still pass the mutation to your children.  Types of thalassemias  Some types of thalassemia have fewer " symptoms than others. How severe the thalassemia is depends on how many mutated genes you have inherited and how many alpha or beta blocks are affected.   Alpha Thalassemia  Silent carrier (1 alpha mutation): People with this type have no symptoms and often do not know they have thalassemia.  Alpha thalassemia trait (2 alpha mutations): Some people with this trait may have mild anemia, but they often feel well.  Hemoglobin H disease (3 alpha mutations): People with this disorder have anemia more often. They sometimes need blood transfusions, but can have a normal life span.  Hemoglobin Barts (4 alpha mutations):  With this type, no alpha blocks are made. Severe problems occur during the mother's pregnancy and newborns rarely survive.  Beta Thalassemia  Beta thalassemia trait (1 beta mutation): People with this trait (also called beta thalassemia minor) have red blood cells that are small. Mild anemia can occur, but it is rare.  Beta thalassemia intermedia (2 beta mutations): In this type, both beta genes are abnormal, but these mutations may be mild. More severe types sometimes need blood transfusions to treat anemia and medicine to treat iron buildup. Patients have normal life spans.  Beta thalassemia major (2 severe beta mutations): This is the most severe form of beta thalassemia. Both beta genes are abnormal, causing little to no beta blocks to be made. Patients often need medicine to reduce iron buildup, as well as monthly blood transfusions to stay healthy. The only cure at this time is a bone marrow transplant. More options are still being studied.  Treatment and outcomes  Mild types: People with a mild type of alpha or beta thalassemia rarely need treatment. Some need folic acid to help make more red blood cells. Most have normal life spans.  Moderate to severe types: Patients with these types have a higher risk for reduced growth, early bone thinning and pregnancy problems.  Most severe types: These  patients often need regular blood transfusions, even if not sick. It is common to have iron build up in your body, so medicine may be needed to reduce the buildup. If left untreated, too much iron, especially in the liver and heart, can lead to premature death.  Outcomes for patients with alpha or beta thalassemia are usually very good in developed countries like the [place]. Survival rates higher than 90% have been reported for children.   For informational purposes only. Not to replace the advice of your health care provider. Copyright   2021 [facility]. All rights reserved. Clinically reviewed by Junaid Day MD, Hematology. [employer] [id number] - REV 08/21.    Nutrition During Pregnancy: Care Instructions  Overview     Healthy eating when you are pregnant is important for you and your baby. It can help you feel well and have a successful pregnancy and delivery. During pregnancy your nutrition needs increase. Even if you have excellent eating habits, your doctor may recommend a multivitamin to make sure you get enough iron and folic acid.  You may wonder how much weight you should gain. In general, if you were at a healthy weight before you became pregnant, then you should gain between 25 and 35 pounds. If you were overweight before pregnancy, then you'll likely be advised to gain 15 to 25 pounds. If you were underweight before pregnancy, then you'll probably be advised to gain 28 to 40 pounds. Your doctor will work with you to set a weight goal that is right for you. Gaining a healthy amount of weight helps you have a healthy baby.  Follow-up care is a key part of your treatment and safety. Be sure to make and go to all appointments, and call your doctor if you are having problems. It's also a good idea to know your test results and keep a list of the medicines you take.  How can you care for yourself at home?  Eat plenty of fruits and vegetables. Include a variety of orange, yellow,  and leafy dark-green vegetables every day.  Choose whole-grain bread, cereal, and pasta. Good choices include whole wheat bread, whole wheat pasta, brown rice, and oatmeal.  Get 4 or more servings of milk and milk products each day. Good choices include nonfat or low-fat milk, yogurt, and cheese. If you cannot eat milk products, you can get calcium from calcium-fortified products such as orange juice, soy milk, and tofu. Other non-milk sources of calcium include leafy green vegetables, such as broccoli, kale, mustard greens, turnip greens, bok ralf, and brussels sprouts.  If you eat meat, pick lower-fat types. Good choices include lean cuts of meat and chicken or turkey without the skin.  Do not eat shark, swordfish, omar mackerel, or tilefish. They have high levels of mercury, which is dangerous to your baby. You can eat up to 12 ounces a week of fish or shellfish that have low mercury levels. Good choices include shrimp, wild salmon, pollock, and catfish. Limit some other types of fish, such as white (albacore) tuna, to 4 oz (0.1 kg) a week.  Heat lunch meats (such as turkey, ham, or bologna) to 165 F before you eat them. This reduces your risk of getting sick from a kind of bacteria that can be found in lunch meats.  Do not eat unpasteurized soft cheeses, such as brie, feta, fresh mozzarella, and blue cheese. They have a bacteria that could harm your baby.  Limit caffeine. If you drink coffee or tea, have no more than 1 cup a day. Caffeine is also found in luis.  Do not drink any alcohol. No amount of alcohol has been found to be safe during pregnancy.  Do not diet or try to lose weight. For example, do not follow a low-carbohydrate diet. If you are overweight at the start of your pregnancy, your doctor will work with you to manage your weight gain.  Tell your doctor about all vitamins and supplements you take.  When should you call for help?  Watch closely for changes in your health, and be sure to contact your  "doctor if you have any problems.  Where can you learn more?  Go to https://www.[employer].net/patiented  Enter Y785 in the search box to learn more about \"Nutrition During Pregnancy: Care Instructions.\"  Current as of: February 28, 2023               Content Version: 13.8    0416-5932 [employer].   Care instructions adapted under license by your healthcare professional. If you have questions about a medical condition or this instruction, always ask your healthcare professional. [employer] disclaims any warranty or liability for your use of this information.      Exercise During Pregnancy: Care Instructions  Overview     Exercise is good for you during a healthy pregnancy. It can relieve back pain, swelling, and other discomforts. It also prepares your muscles for childbirth. And exercise can improve your energy level and help you sleep better.  If your doctor advises it, get more exercise. For example, walking is a good choice. Bit by bit, increase the amount you walk every day. Try for at least 30 minutes on most days of the week. You could also try a prenatal exercise class. But if you do not already exercise, be sure to talk with your doctor before you start a new exercise program. Doctors do not recommend contact sports during pregnancy.  Follow-up care is a key part of your treatment and safety. Be sure to make and go to all appointments, and call your doctor if you are having problems. It's also a good idea to know your test results and keep a list of the medicines you take.  How can you care for yourself at home?  Talk with your doctor about the right kind of exercise for each stage of pregnancy.  Listen to your body to know if your exercise is at a safe level.  Do not become overheated while you exercise. High body temperature can be harmful. Avoid activities that can make your body too hot.  If you feel tired, take it easy. You might walk instead of run.  If you are used to " "strenuous exercise, ask your doctor how to know when it's time to slow down.  If you exercised before getting pregnant, you should be able to keep up your routine early in your pregnancy. Later in your pregnancy, you may want to switch to more gentle activities.  Drink plenty of fluids before, during, and after exercise.  Avoid contact sports, such as soccer and basketball. Also avoid risky activities. These include scuba diving, horseback riding, and exercising at a high altitude (above 6,000 feet). If you live in a place with a high altitude, talk to your doctor about how you can exercise safely.  Do not get overtired while you exercise. You should be able to talk while you work out.  After your fourth month of pregnancy, avoid exercises that require you to lie flat on your back on a hard surface. These include sit-ups and some yoga poses.  Get plenty of rest. You may be very tired while you are pregnant.  Where can you learn more?  Go to https://www.[employer].net/patiented  Enter S801 in the search box to learn more about \"Exercise During Pregnancy: Care Instructions.\"  Current as of: July 11, 2023               Content Version: 13.8    3055-0559 [employer].   Care instructions adapted under license by your healthcare professional. If you have questions about a medical condition or this instruction, always ask your healthcare professional. [employer] disclaims any warranty or liability for your use of this information.      Learning About Pregnancy and Obesity  How does your weight affect your pregnancy?     The basics of prenatal care are the same for everyone, regardless of size. You'll get what you need to have a healthy baby.  But your size can make a difference in a few things. You and your doctor will have to watch your pregnancy weight. Your weight may affect your labor and delivery.  You may have some extra doctor visits and tests. And you may have some tests earlier in your " "pregnancy. You'll need to pay close attention to things like blood pressure and the chance of getting gestational diabetes. (This is a type of diabetes that sometimes happens during pregnancy.) And close attention will be given to your developing baby.  Work with your doctor to get the care you need. Go to all your doctor visits, and follow your doctor's advice about what to do and what to avoid during pregnancy.  How much weight gain is healthy?  If you are very overweight (obese), experts recommend that you gain between 11 and 20 pounds. Your doctor will work with you to set a weight goal that's right for you. In some cases, your doctor may recommend that you not gain any weight.  How much extra food do you need to eat?  Although you may joke that you're \"eating for two\" during pregnancy, you don't need to eat twice as much food. How much you can eat depends on:  Your height.  How much you weigh when you get pregnant.  How active you are.  If you're carrying more than one fetus (multiple pregnancy).  In the first trimester, you'll probably need the same amount of calories as you did before you were pregnant. In general, in your second trimester, you need to eat about 340 extra calories a day. In your third trimester, you need to eat about 450 extra calories a day.  What can you do to have a healthy pregnancy?  The best things you can do for you and your baby are to eat healthy foods, get regular exercise, avoid alcohol and smoking, and go to your doctor visits.  Eat a variety of foods from all the food groups. Make sure to get enough calcium and folic acid.  You may want to work with a dietitian to help you plan healthy meals to get the right amount of calories for you.  If you didn't exercise much before you got pregnant, talk to your doctor about how you can slowly get more active. Your doctor may want to set up an exercise program with you.  Where can you learn more?  Go to " "https://www.[employer].net/patiented  Enter B644 in the search box to learn more about \"Learning About Pregnancy and Obesity.\"  Current as of: July 11, 2023               Content Version: 13.8    2630-5864 [employer].   Care instructions adapted under license by your healthcare professional. If you have questions about a medical condition or this instruction, always ask your healthcare professional. [employer] disclaims any warranty or liability for your use of this information.      You have been provided the CDC Warning Signs in Pregnancy document.    Additional copies can be found here: www.[employer].[email or web address].pdf    .whs  "

## 2023-11-19 LAB
ABO/RH(D): NORMAL
ANTIBODY SCREEN: NEGATIVE
SPECIMEN EXPIRATION DATE: NORMAL

## 2023-11-20 ENCOUNTER — LAB (OUTPATIENT)
Dept: LAB | Facility: CLINIC | Age: 33
End: 2023-11-20
Attending: ADVANCED PRACTICE MIDWIFE
Payer: COMMERCIAL

## 2023-11-20 ENCOUNTER — TRANSCRIBE ORDERS (OUTPATIENT)
Dept: MATERNAL FETAL MEDICINE | Facility: CLINIC | Age: 33
End: 2023-11-20

## 2023-11-20 ENCOUNTER — TELEPHONE (OUTPATIENT)
Dept: OBGYN | Facility: CLINIC | Age: 33
End: 2023-11-20

## 2023-11-20 ENCOUNTER — ANCILLARY PROCEDURE (OUTPATIENT)
Dept: ULTRASOUND IMAGING | Facility: CLINIC | Age: 33
End: 2023-11-20
Attending: ADVANCED PRACTICE MIDWIFE
Payer: COMMERCIAL

## 2023-11-20 ENCOUNTER — PRENATAL OFFICE VISIT (OUTPATIENT)
Dept: OBGYN | Facility: CLINIC | Age: 33
End: 2023-11-20
Attending: ADVANCED PRACTICE MIDWIFE
Payer: COMMERCIAL

## 2023-11-20 VITALS
WEIGHT: 146 LBS | BODY MASS INDEX: 25.08 KG/M2 | HEART RATE: 82 BPM | DIASTOLIC BLOOD PRESSURE: 71 MMHG | SYSTOLIC BLOOD PRESSURE: 106 MMHG

## 2023-11-20 DIAGNOSIS — O09.521 MULTIGRAVIDA OF ADVANCED MATERNAL AGE IN FIRST TRIMESTER: ICD-10-CM

## 2023-11-20 DIAGNOSIS — Z82.79 FAMILY HISTORY OF TURNER SYNDROME: ICD-10-CM

## 2023-11-20 DIAGNOSIS — O09.521 MULTIGRAVIDA OF ADVANCED MATERNAL AGE IN FIRST TRIMESTER: Primary | ICD-10-CM

## 2023-11-20 DIAGNOSIS — O26.90 PREGNANCY RELATED CONDITION, ANTEPARTUM: Primary | ICD-10-CM

## 2023-11-20 DIAGNOSIS — Z32.01 PREGNANCY TEST POSITIVE: ICD-10-CM

## 2023-11-20 DIAGNOSIS — Z32.01 PREGNANCY TEST POSITIVE: Primary | ICD-10-CM

## 2023-11-20 DIAGNOSIS — O09.529 SUPERVISION OF HIGH-RISK PREGNANCY OF ELDERLY MULTIGRAVIDA: ICD-10-CM

## 2023-11-20 PROBLEM — Z87.42 HISTORY OF TERMINATION OF PREGNANCY: Status: RESOLVED | Noted: 2019-10-29 | Resolved: 2023-11-20

## 2023-11-20 LAB
ERYTHROCYTE [DISTWIDTH] IN BLOOD BY AUTOMATED COUNT: 12.2 % (ref 10–15)
HBV SURFACE AB SERPL IA-ACNC: 15.83 M[IU]/ML
HBV SURFACE AB SERPL IA-ACNC: REACTIVE M[IU]/ML
HBV SURFACE AG SERPL QL IA: NONREACTIVE
HCT VFR BLD AUTO: 38.7 % (ref 35–47)
HCV AB SERPL QL IA: NONREACTIVE
HGB BLD-MCNC: 13.2 G/DL (ref 11.7–15.7)
HIV 1+2 AB+HIV1 P24 AG SERPL QL IA: NONREACTIVE
MCH RBC QN AUTO: 31 PG (ref 26.5–33)
MCHC RBC AUTO-ENTMCNC: 34.1 G/DL (ref 31.5–36.5)
MCV RBC AUTO: 91 FL (ref 78–100)
PLATELET # BLD AUTO: 205 10E3/UL (ref 150–450)
RBC # BLD AUTO: 4.26 10E6/UL (ref 3.8–5.2)
T PALLIDUM AB SER QL: NONREACTIVE
VIT D+METAB SERPL-MCNC: 30 NG/ML (ref 20–50)
WBC # BLD AUTO: 8.7 10E3/UL (ref 4–11)

## 2023-11-20 PROCEDURE — 87340 HEPATITIS B SURFACE AG IA: CPT

## 2023-11-20 PROCEDURE — 86706 HEP B SURFACE ANTIBODY: CPT

## 2023-11-20 PROCEDURE — 87086 URINE CULTURE/COLONY COUNT: CPT | Performed by: ADVANCED PRACTICE MIDWIFE

## 2023-11-20 PROCEDURE — 86850 RBC ANTIBODY SCREEN: CPT

## 2023-11-20 PROCEDURE — G0008 ADMIN INFLUENZA VIRUS VAC: HCPCS

## 2023-11-20 PROCEDURE — 250N000011 HC RX IP 250 OP 636

## 2023-11-20 PROCEDURE — 85014 HEMATOCRIT: CPT

## 2023-11-20 PROCEDURE — 36415 COLL VENOUS BLD VENIPUNCTURE: CPT

## 2023-11-20 PROCEDURE — 90686 IIV4 VACC NO PRSV 0.5 ML IM: CPT

## 2023-11-20 PROCEDURE — 87491 CHLMYD TRACH DNA AMP PROBE: CPT | Performed by: ADVANCED PRACTICE MIDWIFE

## 2023-11-20 PROCEDURE — 99207 PR PRENATAL VISIT: CPT | Performed by: ADVANCED PRACTICE MIDWIFE

## 2023-11-20 PROCEDURE — 86762 RUBELLA ANTIBODY: CPT

## 2023-11-20 PROCEDURE — 82306 VITAMIN D 25 HYDROXY: CPT

## 2023-11-20 PROCEDURE — 86787 VARICELLA-ZOSTER ANTIBODY: CPT

## 2023-11-20 PROCEDURE — 86803 HEPATITIS C AB TEST: CPT

## 2023-11-20 PROCEDURE — 86901 BLOOD TYPING SEROLOGIC RH(D): CPT

## 2023-11-20 PROCEDURE — 87389 HIV-1 AG W/HIV-1&-2 AB AG IA: CPT

## 2023-11-20 PROCEDURE — 76801 OB US < 14 WKS SINGLE FETUS: CPT | Mod: 26 | Performed by: OBSTETRICS & GYNECOLOGY

## 2023-11-20 PROCEDURE — 86780 TREPONEMA PALLIDUM: CPT

## 2023-11-20 PROCEDURE — 76801 OB US < 14 WKS SINGLE FETUS: CPT

## 2023-11-20 PROCEDURE — 99214 OFFICE O/P EST MOD 30 MIN: CPT | Mod: 25 | Performed by: ADVANCED PRACTICE MIDWIFE

## 2023-11-20 RX ORDER — ASPIRIN 81 MG/1
81 TABLET ORAL DAILY
Qty: 90 TABLET | Refills: 2 | Status: SHIPPED | OUTPATIENT
Start: 2023-11-20 | End: 2024-04-12

## 2023-11-20 ASSESSMENT — PAIN SCALES - GENERAL: PAINLEVEL: NO PAIN (0)

## 2023-11-20 NOTE — PROGRESS NOTES
S Provider New OB Note    Reviewed RN intake note.    Hakeem is a 33 year old female who presents to clinic for a new OB visit.   at 10w0d with Estimated Date of Delivery: 2024 based on LMP which US confirms. Feels well. Has  started PNV.     She has not had bleeding since her LMP.   She has had mild nausea without vomiting. Weight loss has not occurred.   This was a planned pregnancy.   Partner is involved, Lainey De Souza - .    OTHER CONCERNS:   - interested in genetic testing  - vaccines: interested in getting COVID and flu      Pregnancy Complication:  - AMA  - history of D&E for chromosomal abnormality    PSYCHIATRIC:  Denies mood changes.      PHQ-9 score:        10/27/2023     2:12 PM   PHQ-9 SCORE   PHQ-9 Total Score 0               4/15/2020     8:50 AM 2020    11:05 AM 10/27/2023     2:12 PM   SENDY-7 SCORE   Total Score 0 0 0         Past History:  Her past medical history   Past Medical History:   Diagnosis Date    NO ACTIVE PROBLEMS     Varicella    .   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  HISTORY:  Family History   Problem Relation Age of Onset    Cerebrovascular Disease Mother     Hyperlipidemia Mother     LUNG DISEASE Mother     Hypertension Mother     No Known Problems Father     Pancreatitis Maternal Grandmother         some type of pancreas problem, not cancer    Cerebrovascular Disease Maternal Grandfather     Diabetes Paternal Grandmother      Social History     Socioeconomic History    Marital status:      Spouse name: Lainey De Souza   Occupational History    Occupation: analyst   Tobacco Use    Smoking status: Never    Smokeless tobacco: Never   Substance and Sexual Activity    Alcohol use: No    Drug use: No    Sexual activity: Yes     Partners: Male     Birth control/protection: None, Condom   Social History Narrative            How much exercise per  week? 30 mins walking daily    How much calcium per day? PNV-In some foods       How much caffeine per day? none    How much vitamin D per day? PNV    Do you/your family wear seatbelts?  Yes    Do you/your family use safety helmets? n/a    Do you/your family use sunscreen? No-hat    Do you/your family keep firearms in the home? No    Do you/your family have a smoke detector(s)? Yes        2019 Viviana Chan LPN         Current Outpatient Medications   Medication Sig    aspirin 81 MG EC tablet Take 1 tablet (81 mg) by mouth daily    Prenatal Vit-Fe Fumarate-FA (PRENATAL VITAMIN) 27-0.8 MG TABS Take 1 tablet by mouth daily     No current facility-administered medications for this visit.     Facility-Administered Medications Ordered in Other Visits   Medication    lidocaine-EPINEPHrine 1.5 %-[id number] injection     Allergies   Allergen Reactions    Cefradine [Cephradine]      Redness at injection site    Cephalosporins Other (See Comments)       ============================================  MEDICAL HISTORY  Past Medical History:   Diagnosis Date    NO ACTIVE PROBLEMS     Varicella      Past Surgical History:   Procedure Laterality Date    C EACH ADD TOOTH EXTRACTION      DILATION AND EVACUATION N/A 2019    Procedure: DILATION AND EVACUATION;  Surgeon: Meghana Hoyos MD;  Location: [facility]    FOOT SURGERY      GUM SURGERY      oral surgery       OB History    Para Term  AB Living   3 1 1 0 1 1   SAB IAB Ectopic Multiple Live Births   0 0 0 0 1      # Outcome Date GA Lbr Maxime/2nd Weight Sex Delivery Anes PTL Lv   3 Current            2 Term 20 39w3d 10:58 / 01:13 3.28 kg (7 lb 3.7 oz) F Vag-Spont EPI N RAYO      Name: VICKYFEMALE-LAURA      Apgar1: 9  Apgar5: 9   1 AB 18 15w0d             Complications: Chromosome anomaly       Reviewed genetic history form       GYN History- no Abnormal Pap Smears                        Cervical procedures: yes - D&E at 15 weeks                         History of STI: none    I personally reviewed the past social/family/medical and surgical history on the date of service.     OBJECTIVE:  /71   Pulse 82   Wt 66.2 kg (146 lb)   LMP 2023 (Exact Date)   BMI 25.08 kg/m    ROS: 10 point ROS neg other than the symptoms noted above in the HPI.    EXAM:  /71   Pulse 82   Wt 66.2 kg (146 lb)   LMP 2023 (Exact Date)   BMI 25.08 kg/m     EXAM:  GENERAL:  Pleasant pregnant female, alert, cooperative  and well groomed.  SKIN:  Warm and dry, without lesions or rashes  HEAD: Symmetrical features.  MOUTH:  Buccal mucosa pink, moist without lesions.  Teeth in  repair.    NECK:  Thyroid without enlargement and nodules.  Lymph nodes not palpable.   LUNGS:  Clear to auscultation.     HEART:  RRR without murmur.  ABDOMEN: Soft without masses , tenderness or organomegaly.  No CVA tenderness.    MUSCULOSKELETAL:  Full range of motion  EXTREMITIES:  No edema. No significant varicosities.   PELVIC EXAM: deferred      Lab Results   Component Value Date    PAP NIL 02/15/2021    PAP wnl   in careeverywhere 2018        Recommended Flu Vaccine.  Flu Vaccine Given      ASSESSMENT:  33 year old , 10w0d weeks of pregnancy with DACIA of 2024 by LMP and US confirms  Genetic Screening: First Trimester Screen    ICD-10-CM    1. Multigravida of advanced maternal age in first trimester  O09.521 ABO/Rh type and screen     Rubella Antibody IgG     Hepatitis B Surface Antibody     Hepatitis B surface antigen     Hepatitis C antibody     Vitamin D Deficiency     HIV Antigen Antibody Combo Cascade     CBC with platelets     Urine Culture     Treponema Abs w Reflex to RPR and Titer     Varicella Zoster Virus Antibody IgG     Chlamydia trachomatis/Neisseria gonorrhoeae by PCR     [facility] Referral - Pregnancy     aspirin 81 MG EC tablet      2. Supervision of high-risk pregnancy of elderly multigravida  O09.529       3. Family  history of Christine syndrome  Z82.79           Hakeem was seen today for prenatal care.    Diagnoses and all orders for this visit:    Multigravida of advanced maternal age in first trimester  -     ABO/Rh type and screen; Future  -     Rubella Antibody IgG; Future  -     Hepatitis B Surface Antibody; Future  -     Hepatitis B surface antigen; Future  -     Hepatitis C antibody; Future  -     Vitamin D Deficiency; Future  -     HIV Antigen Antibody Combo Cascade; Future  -     CBC with platelets; Future  -     Urine Culture  -     Treponema Abs w Reflex to RPR and Titer; Future  -     Varicella Zoster Virus Antibody IgG; Future  -     Chlamydia trachomatis/Neisseria gonorrhoeae by PCR  -     [facility] Referral - Pregnancy; Future  -     aspirin 81 MG EC tablet; Take 1 tablet (81 mg) by mouth daily    Supervision of high-risk pregnancy of elderly multigravida    Family history of Christine syndrome    Other orders  -     INFLUENZA VACCINE >6 MONTHS (AFLURIA/FLUZONE)          Orders Placed This Encounter   Procedures    INFLUENZA VACCINE >6 MONTHS (AFLURIA/FLUZONE)    Rubella Antibody IgG    Hepatitis B Surface Antibody    Hepatitis B surface antigen    Hepatitis C antibody    Vitamin D Deficiency    HIV Antigen Antibody Combo Cascade    CBC with platelets    Treponema Abs w Reflex to RPR and Titer    Varicella Zoster Virus Antibody IgG    [facility] Referral - Pregnancy    ABO/Rh type and screen        PLAN:  - Reviewed use of triage nurse line and contacting the on-call provider after hours for an urgent need such as fever, vagina bleeding, bladder or vaginal infection, rupture of membranes,  or term labor.    -Ordered routine prenatal lab work.     - Reviewed best evidence for: weight gain for her weight and height for pregnancy:  Based on pre-pregnancy Body mass index is 25.08 kg/m . RECOMMENDED WEIGHT GAIN: 25-35 lbs.        - Reviewed healthy diet and foods to avoid, exercise and activity during  pregnancy; avoiding exposure to toxoplasmosis; and maintenance of a generally healthy lifestyle.   - Discussed the harms, benefits, side effects and alternative therapies for current prescribed and OTC medications. Patient was encouraged to start/continue prenatal vitamins as tolerated.    - Oriented to Practice, types of care, and how to reach a provider.  Pt prefers Undecided between HAKEEM and MD, will continue to consider.     - Patient received 1st trimester new OB education packet complete with aide of [facility] Expectant Family booklet including information on genetic screening test options.  - Patient desires 1st trimester screening - [facility] genetics ordered -which was ordered.    - Reviewed risk for gestational diabetes d/t No known risk factors for GDM,     - Reviewed risk for Pre Eclampsia d/t No known risk factors of High risk for Pre E or meets two or more of the moderate risk factors including Age =35 years or older  and Personal history factors (e.g., low birthweight or small for gestational age, previous adverse pregnancy outcome, >10-year pregnancy interval)  and IS agreeable to starting 81mg aspirin daily after 12 weeks .    - The patient  does not have a history of spontaneous  birth so  WILL NOT consider serial transvaginal cervical length ultrasounds from 16-24 weeks.     -The patient does not have a history of immunosuppresion or HIV so Toxoplasma IgG/IgM WILL NOT be ordered.    -Assess risk for asymptomatic latent TB (prior infection, recent immigrant from epidemic areas, immunosuppression, living in overcrowded environment):   WILL NOT have PPD skin test or Quantiferon-TB Gold Plus blood draw. *both options valid*      - All pt's and partner's  questions discussed and answered.  Pt verbalized understanding of and agreement to plan of care.     - Continue scheduled prenatal care and prn if questions or concerns    MAMADOU Bennett CNM

## 2023-11-21 LAB
BACTERIA UR CULT: NORMAL
C TRACH DNA SPEC QL PROBE+SIG AMP: NEGATIVE
N GONORRHOEA DNA SPEC QL NAA+PROBE: NEGATIVE
RUBV IGG SERPL QL IA: 11.3 INDEX
RUBV IGG SERPL QL IA: POSITIVE
VZV IGG SER QL IA: 623 INDEX
VZV IGG SER QL IA: POSITIVE

## 2023-12-07 ENCOUNTER — PRE VISIT (OUTPATIENT)
Dept: MATERNAL FETAL MEDICINE | Facility: HOSPITAL | Age: 33
End: 2023-12-07
Payer: COMMERCIAL

## 2023-12-11 ENCOUNTER — MEDICAL CORRESPONDENCE (OUTPATIENT)
Dept: HEALTH INFORMATION MANAGEMENT | Facility: CLINIC | Age: 33
End: 2023-12-11

## 2023-12-11 ENCOUNTER — LAB (OUTPATIENT)
Dept: LAB | Facility: HOSPITAL | Age: 33
End: 2023-12-11
Attending: OBSTETRICS & GYNECOLOGY
Payer: COMMERCIAL

## 2023-12-11 ENCOUNTER — OFFICE VISIT (OUTPATIENT)
Dept: MATERNAL FETAL MEDICINE | Facility: HOSPITAL | Age: 33
End: 2023-12-11
Attending: OBSTETRICS & GYNECOLOGY
Payer: COMMERCIAL

## 2023-12-11 ENCOUNTER — ANCILLARY PROCEDURE (OUTPATIENT)
Dept: ULTRASOUND IMAGING | Facility: HOSPITAL | Age: 33
End: 2023-12-11
Attending: OBSTETRICS & GYNECOLOGY
Payer: COMMERCIAL

## 2023-12-11 DIAGNOSIS — O26.90 PREGNANCY RELATED CONDITION, ANTEPARTUM: ICD-10-CM

## 2023-12-11 DIAGNOSIS — Z82.79 FAMILY HISTORY OF TURNER SYNDROME: ICD-10-CM

## 2023-12-11 DIAGNOSIS — O09.521 SUPERVISION OF ELDERLY MULTIGRAVIDA IN FIRST TRIMESTER: Primary | ICD-10-CM

## 2023-12-11 DIAGNOSIS — O09.521 SUPERVISION OF ELDERLY MULTIGRAVIDA IN FIRST TRIMESTER: ICD-10-CM

## 2023-12-11 DIAGNOSIS — O09.521 MULTIGRAVIDA OF ADVANCED MATERNAL AGE IN FIRST TRIMESTER: Primary | ICD-10-CM

## 2023-12-11 PROCEDURE — 99207 PR NO CHARGE LOS: CPT | Performed by: OBSTETRICS & GYNECOLOGY

## 2023-12-11 PROCEDURE — 76813 OB US NUCHAL MEAS 1 GEST: CPT

## 2023-12-11 PROCEDURE — 36415 COLL VENOUS BLD VENIPUNCTURE: CPT

## 2023-12-11 PROCEDURE — 76813 OB US NUCHAL MEAS 1 GEST: CPT | Mod: 26 | Performed by: OBSTETRICS & GYNECOLOGY

## 2023-12-11 PROCEDURE — 96040 HC GENETIC COUNSELING, EACH 30 MINUTES: CPT | Performed by: GENETIC COUNSELOR, MS

## 2023-12-11 NOTE — NURSING NOTE
Hakeem Meade is a  at 13w0d who presents to [facility] for GC and NT ultrasound. Pt denies bldg/lof/change in discharge, contractions, headache, vision changes, chest pain/SOB or edema. SBAR given to Dr. Almonte, see note in Epic.

## 2023-12-11 NOTE — PROGRESS NOTES
Please see the imaging tab for details of the ultrasound performed today.    Rosario Almonte MD  Specialist in Maternal-Fetal Medicine

## 2023-12-11 NOTE — PROGRESS NOTES
[facility]  Genetic Counseling Consult    Patient:  Hakeem Meade  Preferred Name: Hakeem YOB: 1990   Date of Service:  12/11/23   MRN: [id number]    Hakeem was seen at the [facility] for genetic consultation. The indication for genetic counseling is advanced maternal age. The patient was unaccompanied to this visit.     The session was conducted in English.      IMPRESSION/ PLAN   1. Hakeem has not had genetic screening in this pregnancy but elected to have screening today.     2. During today's [facility] visit, Hakeem had a blood draw for expanded non-invasive prenatal testing (also called NIPT, NIPS, or cell-free DNA) through [employer]. The expanded NIPT screens for trisomy 21, 18, and 13 and 22q11.2 deletion syndrome. The patient opted to screen for sex chromosome aneuploidies, including reported fetal sex.  In accordance with current guidelines, other microdeletion syndromes and rare autosomal trisomies were not included, but reflex to include these conditions remains available with expanded NIPT if there is an indication later in the pregnancy. Results are expected in 7-14 days. The patient will be called with results and if they do not answer they requested a detailed message with results on their voicemail, including the predicted fetal sex information.  Patient was informed that results, including fetal sex, will be available in [employer].    3. Since the patient chose aneuploidy screening via NIPT, quad screen is NOT recommended in the second trimester. If the patient desires screening for open neural tube defects, maternal serum AFP only is recommended, ideally between 16-18 weeks gestation.    4. Hakeem had a nuchal translucency ultrasound today. Please see the ultrasound report for further details.    5. Further recommendations include nuchal translucency ultrasound with MFM. Later in the pregnancy, recommendations also  include a fetal anatomy level II ultrasound with [facility] around 18-20 weeks. The appointment(s) has not been scheduled yet. MFM schedulers will reach out or the patient can call [phone].     PREGNANCY HISTORY   /Parity:       Hakeem's pregnancy history is significant for:   2018: 15 week D&E termination for cystic hygroma and NIPT positive for monosomy X, products of conception testing on fetal skin confirmed diagnosis 45,X    Monosomy X, also called Christine syndrome, is a chromosome difference that occurs in about 1 in 2000 females. The presence of 45, X causes the symptoms of Christine syndrome. The features of Christine syndrome can include: shorter stature, absence of the ovaries and infertility, learning trouble, webbing at the neck, increased risk for thyroid trouble, increased risk for type ll diabetes, and congenital heart defects. The features or symptoms are variable; it is unlikely that a female with monosomy X would have each and every symptom. However, short stature and infertility or lack of menstrual cycles are consistent findings in females with Christine syndrome.     Monosomy X is a common chromosome abnormality in pregnancy losses. In fact, approximately 10% of spontaneous abortions that have testing have a finding of monosomy X. In addition, approximately 99% of babies with monosomy X will not survive to birth. The majority of these pregnancy losses occur in the first trimester. Those affected babies that do survive to birth likely have less severe features or may be mosaic which means some of their cells have two copies of X (46,XX) and others only have one copy of X (45,X)    There is limited research regarding the diagnostic value of sonographic findings in fetuses with Christine syndrome. One study of 69 reported cases of Christine syndrome suggests that 29.8% of affected fetuses have a sign detected in the first trimester including an increased NT (>3mm) and a nuchal cystic hygroma. However,  "most cases of cystic hygroma were diagnosed in the second trimester. Other sonographic findings in the second trimester inlcude hydrops, ventriculomegaly, renal abnormalities, short femur, choroid plexus cyst, echogenic bowel, and echogenic intracardial focus.Congenital heart defects were diagnosed in 13% of cases, most commonly aortic coarctation. Overall 68.1% of affected fetuses had findings on sonography while 31.9% did not. While this study is limited by small sample size it does support the value of sonographic findings to increase the detection rate of Christine syndrome but not be diagnostic. [Ky et al., 2006]     We reviewed, since monosomy X is a sporadic event, the aneuploidy risks for this (and future) pregnancies is not significantly increased. Her age related risk would be more appropriate, as discussed below.      CURRENT PREGNANCY   Current Age: 33 year old   Age at Delivery: 33 year old  DACIA: 6/17/2024, by Last Menstrual Period                                   Gestational Age: 13w0d  This pregnancy is a single gestation.   This pregnancy was conceived spontaneously.    MEDICAL HISTORY   Hakeem s reported medical history is not expected to impact pregnancy management or risks to fetal development.       FAMILY HISTORY   A three-generation pedigree was obtained previously by a genetic counselor on 10/29/2019 and updated today. The original and updated version is scanned under the \"Media\" tab in Epic.  The family history was reported by Hakeem.    The following significant updates were reported today:   The father of the pregnancy and Hakeem are healthy.   They have a healthy daughter, now 3  No other significant updates.     Otherwise, the reported family history is unremarkable for multiple miscarriages, stillbirths, birth defects, intellectual disabilities, known genetic conditions, and consanguinity.       RISK ASSESSMENT FOR INHERITED CONDITIONS AND CARRIER SCREENING OPTIONS   Expanded carrier screening " is available to screen for autosomal recessive conditions and X-linked conditions in a large list of genes. Carrier screening does not test the pregnancy but gives a risk assessment for the pregnancy and future pregnancies to have the condition. Expanded carrier screening is designed to identify carrier status for conditions that are primarily childhood or adolescent onset. Expanded carrier screening does not evaluate for adult-onset conditions such as hereditary cancer syndromes, dementia/ Alzheimer's disease, or cardiovascular disease risk factors. Additionally, expanded carrier screening is not comprehensive for all known genetic diseases or inherited conditions. Carrier screening does not test for all genetic and health conditions or risk factors.     Autosomal recessive conditions happen when a mutation has been inherited from the egg and sperm and include conditions like cystic fibrosis, thalassemia, hearing loss, spinal muscular atrophy, and more. We reviewed that when both biological parents carry a harmful genetic change in a gene associated with autosomal recessive inheritance, each of their pregnancies has a 1 in 4 (25%) chance to be affected by that condition. X-linked conditions happen when a mutation has been inherited from the egg and include conditions like fragile X syndrome.With x-linked conditions, the specific risk generally depends on the chromosomal sex of the fetus, with XY individuals (generally male) being most severely affected.     [place] was reviewed. About [place] Screening    The patient does NOT have a family history of known inherited conditions. This does NOT mean the patient and/or their partner is not a carrier of a condition. Approximately 90% of couples at an increased reproductive risk for an inherited condition have no family history of that condition.     The patient has not had carrier screening previously.     The patient declined the carrier screening options  and declined a thorough discussion of the options. They are aware the option will remain, and they can contact us if they would like to pursue screening. See below for the more detailed information we dicussed.    Hakeem does have a normal hemoglobin electrophoresis result which means she is unlikely to be a carrier for a beta-chain related hemoglobinopathy. Per the results reviewed, she is not a carrier for sickle cell disease or other screened variant beta chains. This testing did not evaluate for carrier status of alpha-chain related hemoglobinopathies and could still be pursue via molecular genetic testing.      RISK ASSESSMENT FOR CHROMOSOME CONDITIONS   We explained that the risk for fetal chromosome abnormalities increases with maternal age. We discussed specific features of common chromosome abnormalities, including Down syndrome, trisomy 13, trisomy 18, and sex chromosome trisomies.    At age 33 at midtrimester, the risk to have a baby with Down syndrome is 1 in 274.   At age 33 at midtrimester, the risk to have a baby with any chromosome abnormality is 1 in 135.     Hakeem has not had genetic screening in this pregnancy but elected to have screening today.      GENETIC TESTING OPTIONS   Genetic testing during a pregnancy includes screening and diagnostic procedures.      Screening tests are non-invasive which means no risk to the pregnancy and includes ultrasounds and blood work. The benefits and limitations of screening were reviewed. Screening tests provide a risk assessment (chance) specific to the pregnancy for certain fetal chromosome abnormalities but cannot definitively diagnose or exclude a fetal chromosome abnormality. Follow-up genetic counseling and consideration of diagnostic testing is recommended with any abnormal screening result. Diagnostic testing during a pregnancy is more certain and can test for more conditions. However, the tests do have a risk of miscarriage that requires careful  consideration. These tests can detect fetal chromosome abnormalities with greater than 99% certainty. Results can be compromised by maternal cell contamination or mosaicism and are limited by the resolution of current genetic testing technology.     There is no screening or diagnostic test that detects all forms of birth defects or intellectual disability.     We discussed the following screening options:     Non-invasive prenatal testing (NIPT)  Also called cell-free DNA screening because it detects chromosomes from the placenta in the pregnant person's blood  Can be done any time after 10 weeks gestation  Standard recommendation for NIPT screens for trisomy 21, trisomy 18, trisomy 13, with the option of adding sex chromosome aneuploidies, without or without predicted sex  Cannot screen for open neural tube defects, maternal serum AFP after 15 weeks is recommended  New NIPT options include screening for other trisomies, microdeletion syndromes, and in some cases fetal blood antigens. Guidelines do not recommend these conditions are included in standard screening. These options have limitations and should be discussed with a genetic counselor.   However, current (2023) ACMG guidelines do recommend that screening for one microdeletion syndrome, called 22q11.2 deletion syndrome be offered to all pregnant patients. 22q11.2 deletion syndrome has an estimated prevalence of 1 in 990 to 1 in 2148 (0.05-0.1%). Risk is not thought to increase with maternal age. Clinical features are variable but include congenital heart defects, cleft palate, developmental delays, immune system deficiencies, and hearing loss. Approximately 90% of cases are de veronica (a sporadic new change in a pregnancy). Cell-free DNA screening for 22q11.2 deletion syndrome is available (Expanded NIPS through [employer]). We discussed the limitations of cell-free DNA screening in detecting microdeletions and the possiblity of false positives and false  negatives. Expanded NIPS also allows for reflex to include other microdeletion conditions and rare autosomal trisomies if an indication would arise later in the pregnancy. The patient opted into 22q11.2 deletion syndrome screening as part of the expanded NIPT option.     AFP only  Blood work from arm for levels of AFP (alpha-fetoprotein)  Can be done between 14w1d and 23w6d gestation  Screens for open neural tube defects like spina bifida  Recommended for those that do not want genetic testing (for chromosome conditions), those who did screening other than the quad screen, and those with a personal or family history of neural tube defects.    We discussed the following ultrasound options:    Nuchal translucency (NT) ultrasound  Ultrasound between [id number]-11o7n that includes nuchal translucency measurement and nasal bone assessments  Nuchal translucency refers to the space at the back of the neck where fluid builds up. All babies at this stage have fluid and there is only concern if there is too much fluid  Nasal bone refers to the small bone in the nose. There is concern for conditions like Down syndrome if the bone cannot be seen at all  This ultrasound can be done as part of first trimester screening, at the same time as another screen (NIPT), at the same time as a CVS, or if the patients does not want genetic screening.  Markers on ultrasound detects about 70% of pregnancies with aneuploidy  Abnormalities on NT ultrasound can also increase the risk for a birth defect, like a heart defect    Comprehensive level II ultrasound (Fetal Anatomy Ultrasound)  Ultrasound done between 18-20 weeks gestation  Screens for major birth defects and markers for aneuploidy (like trisomy 21 and trisomy 18)  Includes looking at the fetus/baby's growth, heart, organs (stomach, kidneys), placenta, and amniotic fluid    We discussed the following diagnostic options:     Chorionic villus sampling (CVS)  Invasive diagnostic procedure done  between 10w0d and 13w6d  The procedure collects a small sample from the placenta for the purpose of chromosomal testing and/or other genetic testing  Diagnostic result; more than 99% sensitivity for fetal chromosome abnormalities  Cannot screen for open neural tube defects, maternal serum AFP after 15 weeks is recommended    Amniocentesis  Invasive diagnostic procedure done after 15 weeks gestation  The procedure collects a small sample of amniotic fluid for the purpose of chromosomal testing and/or other genetic testing  Diagnostic result; more than 99% sensitivity for fetal chromosome abnormalities  Testing for AFP in the amniotic fluid can test for open neural tube defects    It was a pleasure to be involved with [facility] s care. Face-to-face time of the meeting was 30 minutes.    Prerna Mina GC, MS, C  Board Certified and [place] Licensed Genetic Counselor   [facility]  Maternal Fetal Medicine  Office: [phone]  [facility]: [phone]   Fax: [phone]  [facility]

## 2023-12-15 ENCOUNTER — TELEPHONE (OUTPATIENT)
Dept: MATERNAL FETAL MEDICINE | Facility: CLINIC | Age: 33
End: 2023-12-15
Payer: COMMERCIAL

## 2023-12-15 LAB — SCANNED LAB RESULT: NORMAL

## 2023-12-15 NOTE — TELEPHONE ENCOUNTER
Called Hakeem and discussed their normal NIPT result.      The results are negative and consistent with two copies of chromosomes 21, 18, and 13.     Sex chromosome analysis was also included and the result is negative which is consistent with two copies of sex chromosomes. The sex chromosome results were XY. The predicted male sex was disclosed per patient's wishes.    The results are also negative and consistent with no microdeletion in 22q11.21 region.     This puts her current pregnancy at low risk for Down syndrome, trisomy 18, trisomy 13 and sex chromosome abnormalities. This test is reported to have a greater than 99% sensitivity for trisomy 21, trisomy 18, and trisomy 13, and monosomy X. Sensitivity data is not available for individual sex chromosome aneuploidies and 22q11.2 microdeletion (if included). Although these results are reassuring, this does not replace a standard chromosome analysis from a chorionic villus sampling or amniocentesis.     Level II ultrasound is scheduled with [facility] on 01/19/2024.    MSAFP is the appropriate second trimester screening test for open neural tube defects; the maternal quad screen is not recommended.    Her results are available in her Epic chart for her primary OB provider to review.    Prerna Mina MS, [facility]  Licensed Genetic Counselor   [facility]  Maternal Fetal Medicine  [email or web address]  [email or web address]  Office: [phone]  Pager [phone]  [facility]: [phone]   Fax: [phone]

## 2023-12-25 NOTE — PROGRESS NOTES
"[facility]  Return OB Visit      S: Doing well today. Recently went to the [facility] with family. She received the results of her NIPT recently and was relieved that the results were normal. She is otherwise doing very well. She is not nauseated and is sleeping well throughout the night. She denies vaginal bleeding, vaginal discharge, and contractions.  She was swimming recently and thought she felt fetal movement for the first time but is unsure as the movements she feels are very subtle at this point.     Pregnancy notable for  AMA  H/o D&E 2/2 fetal john syndrome    O: /70   Pulse 109   Ht 1.625 m (5' 3.98\")   Wt 69.9 kg (154 lb)   LMP 2023 (Exact Date)   BMI 26.45 kg/m    Weight gain: 17 lbs    Gen: Well-appearing, NAD    Fundal Height:  15.5 cm  FHR: 154 bpm     A/P:  Hakeem Meade is a 33 year old  at 15w2d by 10w2d US, here for return OB visit.    # AMA  - NIPT low risk as below   - Discussed recommendation for asa. She has one moderate risk factor otherwise no risk factors. Per ACOG, asa is recommended when \"several\" moderate risk factors are present. Discussed indication being pre-eclampsia prophylaxis. Patient reports that she is very hesitant to take any medications in pregnancy and is strongly hoping to avoid asa. Given AMA is her only risk factor, discussed that not taking asa would be ok.     # PNC  - Prenatal labs: Rh pos, Rubella immune, HepB/HepC/HIV/RPR nonreactive  - Genetics: NIPT low risk, NT US nl, Level II US scheduled   - Immunizations: s/p flu vaccine, plan for Tdap at 28 weeks, she will be 32-26 weeks past January (per CDC, no need for RSV vaccination outside of September-January)    Return to clinic in 4 weeks. Discussed return precautions.    Staffed with Dr. Martin Reddy MD  Ob/Gyn PGY-2  23 4:23 PM    The Patient was seen in [facility] by JEFFERSON REDDY.  I reviewed the history & exam. Assessment and plan were jointly " made.    Tomeka Salazar MD

## 2023-12-27 ENCOUNTER — PRENATAL OFFICE VISIT (OUTPATIENT)
Dept: OBGYN | Facility: CLINIC | Age: 33
End: 2023-12-27
Payer: COMMERCIAL

## 2023-12-27 VITALS
HEIGHT: 64 IN | WEIGHT: 154 LBS | DIASTOLIC BLOOD PRESSURE: 70 MMHG | HEART RATE: 109 BPM | BODY MASS INDEX: 26.29 KG/M2 | SYSTOLIC BLOOD PRESSURE: 108 MMHG

## 2023-12-27 DIAGNOSIS — O09.529 SUPERVISION OF HIGH-RISK PREGNANCY OF ELDERLY MULTIGRAVIDA: Primary | ICD-10-CM

## 2023-12-27 DIAGNOSIS — O09.521 MULTIGRAVIDA OF ADVANCED MATERNAL AGE IN FIRST TRIMESTER: ICD-10-CM

## 2023-12-27 PROCEDURE — 99213 OFFICE O/P EST LOW 20 MIN: CPT

## 2023-12-27 PROCEDURE — 99207 PR PRENATAL VISIT: CPT | Mod: GE | Performed by: OBSTETRICS & GYNECOLOGY

## 2023-12-27 NOTE — PATIENT INSTRUCTIONS

## 2024-01-15 ENCOUNTER — PRENATAL OFFICE VISIT (OUTPATIENT)
Dept: OBGYN | Facility: CLINIC | Age: 34
End: 2024-01-15
Attending: STUDENT IN AN ORGANIZED HEALTH CARE EDUCATION/TRAINING PROGRAM
Payer: COMMERCIAL

## 2024-01-15 VITALS
SYSTOLIC BLOOD PRESSURE: 100 MMHG | BODY MASS INDEX: 25.83 KG/M2 | DIASTOLIC BLOOD PRESSURE: 69 MMHG | HEART RATE: 80 BPM | HEIGHT: 64 IN | WEIGHT: 151.3 LBS

## 2024-01-15 DIAGNOSIS — O09.529 SUPERVISION OF HIGH-RISK PREGNANCY OF ELDERLY MULTIGRAVIDA: Primary | ICD-10-CM

## 2024-01-15 DIAGNOSIS — O09.521 MULTIGRAVIDA OF ADVANCED MATERNAL AGE IN FIRST TRIMESTER: ICD-10-CM

## 2024-01-15 PROCEDURE — 99214 OFFICE O/P EST MOD 30 MIN: CPT | Performed by: STUDENT IN AN ORGANIZED HEALTH CARE EDUCATION/TRAINING PROGRAM

## 2024-01-15 PROCEDURE — 99207 PR PRENATAL VISIT: CPT | Performed by: STUDENT IN AN ORGANIZED HEALTH CARE EDUCATION/TRAINING PROGRAM

## 2024-01-15 NOTE — PATIENT INSTRUCTIONS
Thank you for trusting us with your care!     If you need to contact us for questions about:  Symptoms, Scheduling & Medical Questions; Non-urgent (2-3 day response) Guillermina message, Urgent (needing response today) [phone] (if after 3:30pm next day response)   Prescriptions: Please call your Pharmacy   Billing: [facility] [phone] or [facility]:[phone]

## 2024-01-15 NOTE — PROGRESS NOTES
[facility]  Return OB Visit      S: Doing well today. She denies vaginal bleeding, vaginal discharge, and contractions. Feeling normal fetal movement.    Pregnancy notable for  AMA - Age 33   H/o D&E 2/2 fetal john syndrome    O: LMP 2023 (Exact Date)   Weight gain: 6.486 kg (14 lb 4.8 oz)      Gen: Well-appearing, NAD    FHR: 155 bpm     A/P:  Hakeem Meade is a 33 year old  at 18w0d by 10w2d US, here for return OB visit.    # AMA (Age 33)  - NIPT low risk as below   - Level II anatomy ultrasound scheduled for      # PNC  - Prenatal labs: Rh pos, Rubella immune, HepB/HepC/HIV/RPR nonreactive  - Genetics: NIPT low risk, NT US nl, Level II US scheduled   - Immunizations: s/p flu vaccine, plan for Tdap at 28 weeks, she will be 32-26 weeks past January (per CDC, no need for RSV vaccination outside of September-January)    Return to clinic in 4 weeks. Discussed return precautions.    Charlene Henley MD,  01/15/24 3:12 PM

## 2024-01-19 ENCOUNTER — OFFICE VISIT (OUTPATIENT)
Dept: MATERNAL FETAL MEDICINE | Facility: CLINIC | Age: 34
End: 2024-01-19
Attending: OBSTETRICS & GYNECOLOGY
Payer: COMMERCIAL

## 2024-01-19 ENCOUNTER — HOSPITAL ENCOUNTER (OUTPATIENT)
Dept: ULTRASOUND IMAGING | Facility: CLINIC | Age: 34
Discharge: HOME | End: 2024-01-19
Attending: OBSTETRICS & GYNECOLOGY
Payer: COMMERCIAL

## 2024-01-19 DIAGNOSIS — O09.522 MULTIGRAVIDA OF ADVANCED MATERNAL AGE IN SECOND TRIMESTER: Primary | ICD-10-CM

## 2024-01-19 DIAGNOSIS — O09.521 MULTIGRAVIDA OF ADVANCED MATERNAL AGE IN FIRST TRIMESTER: ICD-10-CM

## 2024-01-19 PROCEDURE — 76811 OB US DETAILED SNGL FETUS: CPT

## 2024-01-19 PROCEDURE — 99212 OFFICE O/P EST SF 10 MIN: CPT | Mod: 25 | Performed by: OBSTETRICS & GYNECOLOGY

## 2024-01-19 PROCEDURE — 76811 OB US DETAILED SNGL FETUS: CPT | Mod: 26 | Performed by: OBSTETRICS & GYNECOLOGY

## 2024-01-19 NOTE — PROGRESS NOTES
The patient was seen for an ultrasound in the Maternal-Fetal Medicine Center at the [facility] today.  For a detailed report of the ultrasound examination, please see the ultrasound report which can be found under the imaging tab.    If you have questions regarding today's evaluation or if we can be of further service, please contact the Maternal-Fetal Medicine Center.    Jesusita He MD  , OB/GYN  Maternal-Fetal Medicine  [phone] (Pager)

## 2024-02-09 NOTE — PATIENT INSTRUCTIONS
"Weeks 18 to 22 of Your Pregnancy: Care Instructions  At this stage you may find that your nausea and fatigue are gone. You may feel better overall and have more energy. But you might now also have some new discomforts, like sleep problems or leg cramps.    You may start to feel your baby move. These movements can feel like butterflies or bubbles.   Babies at this stage can now suck their thumbs.     Get some exercise every day.  And avoid caffeine late in the day.     Take a warm shower or bath before bed.  Try relaxation exercises to calm your mind and body.     Use extra pillows.  They can help you get comfortable.     Don't use sleeping pills or alcohol.  They could harm your baby.     For leg cramps, stretch and apply heat.  A warm bath, leg warmers, a heating pad, or a hot water bottle can help with muscle aches.   Stretches for leg cramps    Straighten your leg and bend your foot (flex your ankle) slowly upward, toward your knee. Bend your toes up and down.   Stand on a flat surface. Stretch your toes upward. For balance, hold on to the wall or something stable. If it feels okay, take small steps walking on your heels.   Follow-up care is a key part of your treatment and safety. Be sure to make and go to all appointments, and call your doctor if you are having problems. It's also a good idea to know your test results and keep a list of the medicines you take.  Where can you learn more?  Go to [email or web address]  Enter W603 in the search box to learn more about \"Weeks 18 to 22 of Your Pregnancy: Care Instructions.\"  Current as of: July 11, 2023               Content Version: 13.8    5562-7886 [employer].   Care instructions adapted under license by your healthcare professional. If you have questions about a medical condition or this instruction, always ask your healthcare professional. [employer] disclaims any warranty or liability for your use of this " information.

## 2024-02-12 ENCOUNTER — PRENATAL OFFICE VISIT (OUTPATIENT)
Dept: OBGYN | Facility: CLINIC | Age: 34
End: 2024-02-12
Attending: OBSTETRICS & GYNECOLOGY
Payer: COMMERCIAL

## 2024-02-12 VITALS
BODY MASS INDEX: 26.84 KG/M2 | HEIGHT: 64 IN | HEART RATE: 88 BPM | SYSTOLIC BLOOD PRESSURE: 95 MMHG | WEIGHT: 157.2 LBS | DIASTOLIC BLOOD PRESSURE: 65 MMHG

## 2024-02-12 DIAGNOSIS — R05.2 SUBACUTE COUGH: ICD-10-CM

## 2024-02-12 DIAGNOSIS — O09.521 MULTIGRAVIDA OF ADVANCED MATERNAL AGE IN FIRST TRIMESTER: ICD-10-CM

## 2024-02-12 DIAGNOSIS — O09.529 SUPERVISION OF HIGH-RISK PREGNANCY OF ELDERLY MULTIGRAVIDA: Primary | ICD-10-CM

## 2024-02-12 PROCEDURE — 99213 OFFICE O/P EST LOW 20 MIN: CPT | Performed by: OBSTETRICS & GYNECOLOGY

## 2024-02-12 PROCEDURE — 99207 PR PRENATAL VISIT: CPT | Performed by: OBSTETRICS & GYNECOLOGY

## 2024-02-12 NOTE — PROGRESS NOTES
"Subjective:     33 year old  at 22w0d presents for a routine prenatal appointment. She reports two weeks of cough and sore throat/ throat irritation, which she thinks may be from spicy food. Initially had two days of body aches as well. No sputum production, fevers, chills, hemoptysis, shortness of breath, vomiting, diarrhea or any other associated symptoms. She did not take anything for this. Denies a history of respiratory conditions.     No vaginal bleeding or leakage of fluid.  No contractions or cramping. Good fetal movement. No HA, visual changes, RUQ or epigastric pain.     Level II US  Results reviewed normal scan.      Objective:  Vitals:    24 1509   BP: 95/65   Pulse: 88   Weight: 71.3 kg (157 lb 3.2 oz)   Height: 1.625 m (5' 3.98\")     See OB flowsheet    FHR: 155 bpm  Constitutional: Well appearing, NAD  Cardiovascular: Normal rate and rhythm  Respiratory: Normal work of breathing on room air. Lungs clear to auscultation bilaterally. No wheezes, rhonchi, rales.   Extremities: Well perfused, no edema    Level II US on 23  1. Watts intrauterine pregnancy at 18w 4d gestational age here for evaluation of fetal anatomy.  2. No fetal anomalies commonly detected by ultrasound or soft markers of aneuploidy were identified in the detailed fetal anatomic survey within the limits of prenatal  ultrasound.  3. Growth parameters and estimated fetal weight were consistent with established dates.  4. The amniotic fluid volume appeared normal.  5. On transabdominal imaging the cervix appears long and closed.    Assessment/Plan     Encounter Diagnoses   Name Primary?    Supervision of high-risk pregnancy of elderly multigravida Yes    Multigravida of advanced maternal age in first trimester     Subacute cough      - Prenatal labs: Rh pos, Rubella immune, HepB/HepC/HIV/RPR nonreactive. 1 hour GCT at 26-28 wks  - Genetics: NIPT low risk, NT US nl, Level II US nl   - Immunizations: s/p flu vaccine, " plan for Tdap at 28 weeks, she will be 32-26 weeks past January (per CDC, no need for RSV vaccination outside of September-January)  - Suspect subacute dry cough is likely due to GERD vs viral illness vs less likely postnasal drip and should improve with time. At this time, no signs concerning for pneumonia. Discussed plan for supportive care including rest, hydration, and follow-up with any new or worsening symptoms like fever, shortness of breath, difficulty breathing, or any other concerns.   - Reviewed total weight gain, encouraged continued healthy diet and exercise.      - Reviewed why/how to contact provider.      Return to clinic in 4 weeks and prn if questions or concerns.     Chantale Jc, MS3  Dayami Arriaga MD    The above patient was seen and evaluated with the medical student who acted as my scribe for the above note. Agree with note, changes made as appropriate.    Dayami Arriaga MD

## 2024-03-14 NOTE — PATIENT INSTRUCTIONS
Thank you for trusting us with your care!     If you need to contact us for questions about:  Symptoms, Scheduling & Medical Questions; Non-urgent (2-3 day response) [email or web address] message, Urgent (needing response today) [phone] (if after 3:30pm next day response)   Prescriptions: Please call your Pharmacy   Billing: [facility] [phone] or [facility]:[phone]  Round Ligament Pain: Care Instructions  Overview     Round ligament pain is a common pain during pregnancy. You may feel a sharp brief pain on one or both sides of your belly. It may go down into your groin. It's usually felt for the first time during the second trimester. This pain is a normal part of pregnancy.  Your uterus is supported by two ligaments that go from the top and sides of the uterus to the bones of the pelvis. These are the round ligaments. As your uterus grows, these ligaments stretch and tighten with your movements. This may be the cause of the pain. You may find that certain activities seem to cause pain. If you can, avoid those activities.  Your doctor can usually diagnose round ligament pain from your symptoms and an exam. If you have bleeding or other symptoms, your doctor may also do an imaging test, such as an ultrasound. Your doctor may suggest some things that can help the pain, such as rest and strengthening exercises.  Follow-up care is a key part of your treatment and safety. Be sure to make and go to all appointments, and call your doctor if you are having problems. It's also a good idea to know your test results and keep a list of the medicines you take.  How can you care for yourself at home?  If certain movements seem to trigger belly pain, see if you can avoid them or try moving more slowly so the ligaments don't stretch quickly.  Stay active. If your doctor says it's okay, try moderate exercise. You might try things like swimming, walking, or stretching. Ask your doctor about strengthening and stretching exercises  "that may help.  Try a heating pad or cold pack on the area. A warm bath or shower may also help.  Rest when you can.  Ask your doctor about taking acetaminophen for pain. Be safe with medicines. Read and follow all instructions on the label.  Try a belly support band. Some people find that these can help.  When should you call for help?   Call your doctor now or seek immediate medical care if:    You think you might be in labor.     You have new or worse pain.   Watch closely for changes in your health, and be sure to contact your doctor if you have any problems.  Where can you learn more?  Go to https://www.[employer].net/patiented  Enter R110 in the search box to learn more about \"Round Ligament Pain: Care Instructions.\"  Current as of: July 10, 2023               Content Version: 14.0    1631-9603 [employer].   Care instructions adapted under license by your healthcare professional. If you have questions about a medical condition or this instruction, always ask your healthcare professional. [employer] disclaims any warranty or liability for your use of this information.      Leg and Ankle Edema: Care Instructions  Your Care Instructions  Swelling in the legs, ankles, and feet is called edema. It is common after you sit or stand for a while. Long plane flights or car rides often cause swelling in the legs and feet. You may also have swelling if you have to stand for long periods of time at your job. Problems with the veins in the legs (varicose veins) and changes in hormones can also cause swelling. Sometimes the swelling in the ankles and feet is caused by a more serious problem, such as heart failure, infection, blood clots, or liver or kidney disease.  Follow-up care is a key part of your treatment and safety. Be sure to make and go to all appointments, and call your doctor if you are having problems. It's also a good idea to know your test results and keep a list of the medicines " "you take.  How can you care for yourself at home?  If your doctor gave you medicine, take it as prescribed. Call your doctor if you think you are having a problem with your medicine.  Whenever you are resting, raise your legs up. Try to keep the swollen area higher than the level of your heart.  Take breaks from standing or sitting in one position.  Walk around to increase the blood flow in your lower legs.  Move your feet and ankles often while you stand, or tighten and relax your leg muscles.  Wear support stockings. Put them on in the morning, before swelling gets worse.  Eat a balanced diet. Lose weight if you need to.  Limit the amount of salt (sodium) in your diet. Salt holds fluid in the body and may increase swelling.  When should you call for help?   Call 911 anytime you think you may need emergency care. For example, call if:    You have symptoms of a blood clot in your lung (called a pulmonary embolism). These may include:  Sudden chest pain.  Trouble breathing.  Coughing up blood.   Call your doctor now or seek immediate medical care if:    You have signs of a blood clot, such as:  Pain in your calf, back of the knee, thigh, or groin.  Redness and swelling in your leg or groin.     You have symptoms of infection, such as:  Increased pain, swelling, warmth, or redness.  Red streaks or pus.  A fever.   Watch closely for changes in your health, and be sure to contact your doctor if:    Your swelling is getting worse.     You have new or worsening pain in your legs.     You do not get better as expected.   Where can you learn more?  Go to https://www.[employer].net/patiented  Enter N696 in the search box to learn more about \"Leg and Ankle Edema: Care Instructions.\"  Current as of: August 6, 2023               Content Version: 14.0    1359-9126 [employer].   Care instructions adapted under license by your healthcare professional. If you have questions about a medical condition or this " instruction, always ask your healthcare professional. [employer] disclaims any warranty or liability for your use of this information.      Back Pain During Pregnancy: Care Instructions  Overview     Back pain has many possible causes. It is often caused by problems with muscles and ligaments in your back. The extra weight during pregnancy can put stress on your back. Moving, lifting, standing, sitting, or sleeping in an awkward way also can strain your back. Back pain can also be a sign of labor. Although it may hurt a lot, back pain often improves on its own. Use good home treatment, and take care not to stress your back.  Follow-up care is a key part of your treatment and safety. Be sure to make and go to all appointments, and call your doctor if you are having problems. It's also a good idea to know your test results and keep a list of the medicines you take.  How can you care for yourself at home?  Ask your doctor about taking acetaminophen (Tylenol) for pain. Do not take aspirin, ibuprofen (Advil, Motrin), or naproxen (Aleve).  Do not take two or more pain medicines at the same time unless the doctor told you to. Many pain medicines have acetaminophen, which is Tylenol. Too much acetaminophen (Tylenol) can be harmful.  Try heat or ice, whichever feels better. Apply it for 10 to 20 minutes at a time, several times a day. Put a thin cloth between the heat or ice and your skin. A warm bath or shower may also help.  Lie on your left side with your knees and hips bent and a pillow between your legs. This reduces stress on your back.  Try to avoid standing or sitting for too long or heavy lifting. If your job requires lots of standing, sitting, or heavy lifting, ask your employer if you can take short breaks or adjust your work activity. You can ask your doctor to write a note requesting these breaks or other adjustments.  Wear supportive, low-heeled shoes. Avoid flat or high-heeled shoes.  Try a belly  "support band. Supporting your belly can take the strain off your back.  Ask your doctor about how much exercise you can do. Regular exercise such as swimming, water aerobics, walking, or stretching can help with back pain.  Ask your doctor about exercises to stretch, strengthen, and relax your muscles. Your doctor may recommend physical therapy.  When should you call for help?   Call your doctor now or seek immediate medical care if:    You've been having regular contractions for an hour. This means that you've had at least 6 contractions within 1 hour, even after you change your position and drink fluids.     You have new numbness in your buttocks, genital or rectal areas, or legs.     You have a new loss of bowel or bladder control.     You have symptoms of a urinary tract infection. These may include:  Pain or burning when you urinate.  A frequent need to urinate without being able to pass much urine.  Pain in the flank, which is just below the rib cage and above the waist on either side of the back.  Blood in your urine.   Watch closely for changes in your health, and be sure to contact your doctor if:    You do not get better as expected.   Where can you learn more?  Go to https://www.[employer].net/patiented  Enter C696 in the search box to learn more about \"Back Pain During Pregnancy: Care Instructions.\"  Current as of: July 10, 2023               Content Version: 14.0    3964-1188 [employer].   Care instructions adapted under license by your healthcare professional. If you have questions about a medical condition or this instruction, always ask your healthcare professional. [employer] disclaims any warranty or liability for your use of this information.       Labor: Care Instructions  Overview      labor is the start of labor between 20 and 36 weeks of pregnancy. Most babies are born at 37 to 42 weeks of pregnancy. In labor, the uterus contracts to open the cervix. " This is the first stage of childbirth.  labor can be caused by a problem with the baby, the mother, or both. Often the cause is not known.  In some cases, doctors use medicines to try to delay labor until 34 or more weeks of pregnancy. By this time, a baby has grown enough so that problems are not likely. In some cases--such as with a serious infection--it is healthier for the baby to be born early. Your treatment will depend on how far along you are in your pregnancy and on your health and your baby's health.  Follow-up care is a key part of your treatment and safety. Be sure to make and go to all appointments, and call your doctor if you are having problems. It's also a good idea to know your test results and keep a list of the medicines you take.  How can you care for yourself at home?  If your doctor prescribed medicines, take them exactly as directed. Call your doctor if you think you are having a problem with your medicine.  Rest until your doctor advises you about activity.  Do not have sexual intercourse unless your doctor says it is safe.  Use sanitary pads if you have vaginal bleeding. Using pads makes it easier to monitor your bleeding.  Do not smoke or allow others to smoke around you. If you need help quitting, talk to your doctor about stop-smoking programs and medicines. These can increase your chances of quitting for good.  When should you call for help?   Call 911  anytime you think you may need emergency care. For example, call if:    You passed out (lost consciousness).     You have a seizure.     You have severe vaginal bleeding.     You have severe pain in your belly or pelvis that doesn't get better between contractions.     You have had fluid gushing or leaking from your vagina and you know or think the umbilical cord is bulging into your vagina. If this happens, immediately get down on your knees so your rear end (buttocks) is higher than your head. This will decrease the pressure on  "the cord until help arrives.   Call your doctor now or seek immediate medical care if:    You have signs of preeclampsia, such as:  Sudden swelling of your face, hands, or feet.  New vision problems (such as dimness, blurring, or seeing spots).  A severe headache.     You have any vaginal bleeding.     You have belly pain or cramping.     You have a fever.     You have had regular contractions (with or without pain) for an hour. This means that you have 6 or more within 1 hour after you change your position and drink fluids.     You have a sudden release of fluid from the vagina.     You have low back pain or pelvic pressure that does not go away.     You notice that your baby has stopped moving or is moving much less than normal.   Watch closely for changes in your health, and be sure to contact your doctor if you have any problems.  Where can you learn more?  Go to https://www.ActionFlow/patiented  Enter Q400 in the search box to learn more about \" Labor: Care Instructions.\"  Current as of: July 10, 2023               Content Version: 14.0    8640-0611 [employer].   Care instructions adapted under license by your healthcare professional. If you have questions about a medical condition or this instruction, always ask your healthcare professional. [employer] disclaims any warranty or liability for your use of this information.      Weeks 22 to 26 of Your Pregnancy: Care Instructions  Your baby's lungs are getting ready for breathing. Your baby may respond to your voice. Your baby likely turns less, and kicks or jerks more. Jerking may mean that your baby has hiccups.    Think about taking childbirth classes. And start to think about whether you want to have pain medicine during labor.    At your next doctor visit, you may be tested for anemia and for high blood sugar that first occurs during pregnancy (gestational diabetes). These conditions can cause problems for you and your " "baby.    To ease discomfort, such as back pain    Change your position often. Try not to sit or stand for too long.  Get some exercise. Things like walking or stretching may help.  Try using a heating pad or cold pack.    To ease or reduce swelling in your feet, ankles, hands, and fingers    Take off your rings.  Avoid high-sodium foods, such as potato chips.  Prop up your feet, and sleep with pillows under your feet.  Try to avoid standing for long periods of time.  Do not wear tight shoes.  Wear support stockings.  Kegel exercises to prevent urine from leaking    Squeeze your muscles as if you were trying not to pass gas. Your belly, legs, and buttocks shouldn't move. Hold the squeeze for 3 seconds, then relax for 5 to 10 seconds.    Add 1 second each week until you can squeeze for 10 seconds. Repeat the exercise 10 times a session. Do 3 to 8 sessions a day. If these exercises cause you pain, stop doing them and talk with your doctor.  Follow-up care is a key part of your treatment and safety. Be sure to make and go to all appointments, and call your doctor if you are having problems. It's also a good idea to know your test results and keep a list of the medicines you take.  Where can you learn more?  Go to https://www.[employer].net/patiented  Enter G264 in the search box to learn more about \"Weeks 22 to 26 of Your Pregnancy: Care Instructions.\"  Current as of: July 10, 2023               Content Version: 14.0    7140-6780 [employer].   Care instructions adapted under license by your healthcare professional. If you have questions about a medical condition or this instruction, always ask your healthcare professional. [employer] disclaims any warranty or liability for your use of this information.      Learning About Screening for Gestational Diabetes  What is gestational diabetes screening?     Screening for gestational diabetes is a way to look for high blood sugar during pregnancy. " You drink some very sweet liquid. Then you have a blood test to see how your body uses sugar (glucose).  How is gestational diabetes screening done?  Screening for gestational diabetes may be done in a couple of ways.  Two-part screening.  Part one (glucose challenge test): A blood sample is taken after you drink a liquid that contains sugar (glucose). You don't need to stop eating or drinking before this test. If the test shows that you don't have a lot of sugar in your blood, you don't have gestational diabetes.  Part two (oral glucose tolerance test, or OGTT): If the first test shows a lot of sugar in your blood, then you may have an OGTT. You can't eat or drink for at least 8 hours before this test. A blood sample is taken, then you drink a sweet liquid. You have more blood tests after 1 to 3 hours. If the OGTT shows that you have a lot of sugar in your blood, you may have gestational diabetes.  One-part screening.  Sometimes doctors use the OGTT on its own. If the test shows that you don't have a lot of sugar in your blood, you don't have gestational diabetes. If you do have a lot of sugar in your blood, you may have the condition.  What are the risks of screening?  Your blood glucose level may drop very low toward the end of the test. If this happens, you may feel weak, hungry, and restless. Tell your doctor if you have these symptoms. The test usually will be stopped.  You may vomit after drinking the sweet liquid. If this happens, you may need to take the test at a later time.  Your doctor may do more glucose tests at other times during your pregnancy.  Follow-up care is a key part of your treatment and safety. Be sure to make and go to all appointments, and call your doctor if you are having problems. It's also a good idea to know your test results and keep a list of the medicines you take.  Where can you learn more?  Go to https://www.healthwise.net/patiented  Enter A472 in the search box to learn more  "about \"Learning About Screening for Gestational Diabetes.\"  Current as of: 2023               Content Version: 14.0    5969-0437 [employer].   Care instructions adapted under license by your healthcare professional. If you have questions about a medical condition or this instruction, always ask your healthcare professional. [employer] disclaims any warranty or liability for your use of this information.      You have been provided the My Labor and Birth Wishes document.  Please review at home and bring to your next prenatal visit. Bring this sheet to the hospital for your birth. Give copies to your care team members and support person.   Additional copies can be found here:  [email or web address]  Weeks 26 to 30 of Your Pregnancy: Care Instructions  You're starting your last trimester. You'll probably feel your baby moving around more. Your back may ache as your body gets used to your baby's size and length. Take care of yourself, and pay attention to what your body needs.    Talk to your doctor about getting the Tdap shot. It will help protect your  against whooping cough (pertussis). Also ask your doctor about flu and COVID-19 shots if you haven't had them yet. If your blood type is Rh negative, you may be given a shot of Rh immune globulin (such as RhoGAM). It can help prevent problems for your baby.    You may have [place]Shine contractions. They are single or several strong contractions without a pattern. These are practice contractions but not the start of labor.  Be kind to yourself.     Take breaks when you're tired.  Change positions often. Don't sit for too long or stand for too long.  At work, rest during breaks if you can. If you don't get breaks, talk to your doctor about writing a letter to your employer to request them.  Avoid fumes, chemicals, and tobacco smoke.  Be sexual if you want to.     You may be interested in sex, or you may not. Everyone is " "different.  Sex is okay unless your doctor tells you not to.  Your belly can make it hard to find good positions for sex. [place] and explore.  Watch for signs of  labor.    These signs include:   Menstrual-like cramps. Or you may have pain or pressure in your pelvis that happens in a pattern.  About 6 or more contractions in an hour (even after rest and a glass of water).  A low, dull backache that doesn't go away when you change positions.  An increase or change in vaginal discharge.  Light vaginal bleeding or spotting.  Your water breaking.  Know what to do if you think you are having contractions.     Drink 1 or 2 glasses of water.  Lie down on your left side for at least an hour.  While on your side, feel the top of your belly to see if it's tight.  Write down your contractions for an hour. Time how long it is from the start of one contraction to the start of the next.  Call your doctor if you have regular contractions.  Follow-up care is a key part of your treatment and safety. Be sure to make and go to all appointments, and call your doctor if you are having problems. It's also a good idea to know your test results and keep a list of the medicines you take.  Where can you learn more?  Go to https://www.[email or web address].net/patiented  Enter S999 in the search box to learn more about \"Weeks 26 to 30 of Your Pregnancy: Care Instructions.\"  Current as of: July 10, 2023               Content Version: 14.0    9100-3346 [employer].   Care instructions adapted under license by your healthcare professional. If you have questions about a medical condition or this instruction, always ask your healthcare professional. [employer] disclaims any warranty or liability for your use of this information.      Counting Your Baby's Kicks: Care Instructions  Overview     Counting your baby's kicks is one way your doctor can tell that your baby is healthy. You will probably feel your baby move for " "the first time between 16 and 22 weeks. The movement may feel like flutters rather than kicks. Your baby may move more at certain times of the day. When you are active, you may notice less kicking than when you are resting. At your prenatal visits, your doctor will ask whether the baby is active.  In your last trimester, your doctor may ask you to count the number of times you feel your baby move.  Follow-up care is a key part of your treatment and safety. Be sure to make and go to all appointments, and call your doctor if you are having problems. It's also a good idea to know your test results and keep a list of the medicines you take.  How do you count fetal kicks?  A common method of checking your baby's movement is to note the length of time it takes to count 10 movements (such as kicks, flutters, or rolls).  Pick your baby's most active time of day to count. This may be any time from morning to evening.  If you don't feel 10 movements in an hour, have something to eat or drink and count for another hour. If you don't feel at least 10 movements in the 2-hour period, call your doctor.  Do not use an at-home Doppler heart monitor in place of counting fetal movements.  When should you call for help?   Call your doctor now or seek immediate medical care if:    You feel fewer than 10 movements in a 2-hour period.     You noticed that your baby has stopped moving or is moving less than normal.   Watch closely for changes in your health, and be sure to contact your doctor if you have any problems.  Where can you learn more?  Go to https://www.[email or web address].net/patiented  Enter U048 in the search box to learn more about \"Counting Your Baby's Kicks: Care Instructions.\"  Current as of: July 10, 2023               Content Version: 14.0    8044-0356 [employer] Inc[employer].   Care instructions adapted under license by your healthcare professional. If you have questions about a medical condition or this instruction, always ask " your healthcare professional. [employer] Inc[employer] disclaims any warranty or liability for your use of this information.

## 2024-03-15 ENCOUNTER — APPOINTMENT (OUTPATIENT)
Dept: LAB | Facility: CLINIC | Age: 34
End: 2024-03-15
Attending: OBSTETRICS & GYNECOLOGY
Payer: COMMERCIAL

## 2024-03-15 ENCOUNTER — PRENATAL OFFICE VISIT (OUTPATIENT)
Dept: OBGYN | Facility: CLINIC | Age: 34
End: 2024-03-15
Attending: OBSTETRICS & GYNECOLOGY
Payer: COMMERCIAL

## 2024-03-15 VITALS
WEIGHT: 162 LBS | BODY MASS INDEX: 27.66 KG/M2 | HEIGHT: 64 IN | SYSTOLIC BLOOD PRESSURE: 113 MMHG | DIASTOLIC BLOOD PRESSURE: 77 MMHG | HEART RATE: 75 BPM

## 2024-03-15 DIAGNOSIS — O09.529 SUPERVISION OF HIGH-RISK PREGNANCY OF ELDERLY MULTIGRAVIDA: Primary | ICD-10-CM

## 2024-03-15 LAB
ERYTHROCYTE [DISTWIDTH] IN BLOOD BY AUTOMATED COUNT: 12.8 % (ref 10–15)
GLUCOSE 1H P 50 G GLC PO SERPL-MCNC: 128 MG/DL (ref 70–129)
HCT VFR BLD AUTO: 35.6 % (ref 35–47)
HGB BLD-MCNC: 11.9 G/DL (ref 11.7–15.7)
MCH RBC QN AUTO: 31.2 PG (ref 26.5–33)
MCHC RBC AUTO-ENTMCNC: 33.4 G/DL (ref 31.5–36.5)
MCV RBC AUTO: 93 FL (ref 78–100)
PLATELET # BLD AUTO: 194 10E3/UL (ref 150–450)
RBC # BLD AUTO: 3.82 10E6/UL (ref 3.8–5.2)
T PALLIDUM AB SER QL: NONREACTIVE
WBC # BLD AUTO: 10.3 10E3/UL (ref 4–11)

## 2024-03-15 PROCEDURE — 82950 GLUCOSE TEST: CPT | Performed by: OBSTETRICS & GYNECOLOGY

## 2024-03-15 PROCEDURE — 36415 COLL VENOUS BLD VENIPUNCTURE: CPT | Performed by: OBSTETRICS & GYNECOLOGY

## 2024-03-15 PROCEDURE — 86780 TREPONEMA PALLIDUM: CPT | Performed by: OBSTETRICS & GYNECOLOGY

## 2024-03-15 PROCEDURE — 85027 COMPLETE CBC AUTOMATED: CPT | Performed by: OBSTETRICS & GYNECOLOGY

## 2024-03-15 PROCEDURE — 99207 PR PRENATAL VISIT: CPT | Performed by: OBSTETRICS & GYNECOLOGY

## 2024-03-15 PROCEDURE — 99213 OFFICE O/P EST LOW 20 MIN: CPT | Performed by: OBSTETRICS & GYNECOLOGY

## 2024-03-15 ASSESSMENT — PAIN SCALES - GENERAL: PAINLEVEL: NO PAIN (0)

## 2024-03-15 NOTE — PROGRESS NOTES
"Subjective  Hakeem Meade is a 33 year old  at 26w4d who presents to the clinic today for BROCK. She has no concerns today. She has been feeling fetal movement. She has not had any vaginal bleeding, loss of fluid, headaches, RUQ pain, contractions, or extremity swelling.     Objective  /77   Pulse 75   Ht 1.625 m (5' 3.98\")   Wt 73.5 kg (162 lb)   LMP 2023 (Exact Date)   BMI 27.82 kg/m    Physical Exam  Constitutional:       General: She is not in acute distress.  Cardiovascular:      Rate and Rhythm: Normal rate and regular rhythm.      Heart sounds: No murmur heard.  Pulmonary:      Effort: Pulmonary effort is normal.      Breath sounds: Normal breath sounds.   Musculoskeletal:         General: No swelling.      Cervical back: No tenderness.      Right lower leg: No edema.      Left lower leg: No edema.   Lymphadenopathy:      Cervical: No cervical adenopathy.   Neurological:      Mental Status: She is alert.   Psychiatric:         Mood and Affect: Mood normal.       FHR: 151    Fundal Height: 25 cm     Assessment/Plan  Hakeem Meade is a 33 year old  at 26w4d who presents to the clinic today for BROCK.     1 Routine Prenatal Care   -GCT today   -CBC   -Treponema   -Tdap at next OB visit   -Discussed options for birth control after this pregnancy, considers permanent method, but not 100% sure, may consider IUD or nexplanon.  Has private insurance so does not need tubal papers.  - planning epidural  - reviewed 3rd trimester information  - <28 weeks, plan TDaP at next visit    Sam Carroll, MS-3    I, Mirella Holly, was present with the medical student who participated in the service and in the documentation of the note. I have verified the history and personally performed the physical exam and medical decision making. I agree with the assessment and plan of care as documented in the note.     Mirella Holly MD           "

## 2024-04-09 NOTE — PROGRESS NOTES
[facility]    CC: Follow-up prenatal care    Subjective:  Hakeem Meade is feeling good about pregnancy.  Here with her . She denies loss of fluid, changes in vaginal discharge or vaginal bleeding.  Reports good fetal movement.  She has been having no regular contractions. Denies headache, vision changes, chest pain, shortness of breath, dysuria, constipation or diarrhea.    Questions and concerns:   Wakes up once a night with snoring, not feeling tired during the day time.  Has gained ~40 lbs since pregravid weight, concerned about this pregnancy. Last pregnancy was , daughter was 7 lbs, 35 lbs at delivery 39w.    Pregnancy notable for:  - h/o D&E for 45X     Objective:  /74   Pulse 86   Wt 77.1 kg (170 lb)   LMP 2023 (Exact Date)   BMI 29.20 kg/m    General: Alert, oriented no apparent distress    See OB flowsheet    Assessment/plan:   33 year old  at 30w4d by LMP c/w 10w2d US presenting for follow-up prenatal care.     Prenatal care:  -OB labs reviewed: B POS, rubella immune, HIV/hepatitis B/hepatitis C negative, treponema negative  -Genetics: NIPS low risk, XY  -Ultrasound: L2 US normal  -3rd tri labs: , Hgb 11.9, trep neg   -Immunizations: S/p Flu, COVID, Tdap today  -GBS at 36 weeks  -Feeding: breast, discussed breast vs formula and introduction of a bottle if needed   -Contraception: thinking about Nexplanon  -Birth wishes: epidural, may want an IOL in 40th week     Excessive weight gain  - discussed exercise, limiting simple carbs. Discussed EFW guidelines for recommending primary CS (>5000g)  - RADHA can develop during pregnancy, however, there is not data on improved outcomes with screening and initiation of CPAP if needed. Given close interval to delivery will defer referral for sleep study until after pregnancy if needed.     Follow-up in clinic in 2 weeks, scheduled     Maureen Ashton MD

## 2024-04-12 ENCOUNTER — PRENATAL OFFICE VISIT (OUTPATIENT)
Dept: OBGYN | Facility: CLINIC | Age: 34
End: 2024-04-12
Attending: STUDENT IN AN ORGANIZED HEALTH CARE EDUCATION/TRAINING PROGRAM
Payer: COMMERCIAL

## 2024-04-12 VITALS
BODY MASS INDEX: 29.2 KG/M2 | WEIGHT: 170 LBS | DIASTOLIC BLOOD PRESSURE: 74 MMHG | SYSTOLIC BLOOD PRESSURE: 117 MMHG | HEART RATE: 86 BPM

## 2024-04-12 DIAGNOSIS — O09.523 MULTIGRAVIDA OF ADVANCED MATERNAL AGE IN THIRD TRIMESTER: ICD-10-CM

## 2024-04-12 DIAGNOSIS — O09.529 SUPERVISION OF HIGH-RISK PREGNANCY OF ELDERLY MULTIGRAVIDA: Primary | ICD-10-CM

## 2024-04-12 DIAGNOSIS — Z23 NEED FOR TDAP VACCINATION: ICD-10-CM

## 2024-04-12 PROCEDURE — 99213 OFFICE O/P EST LOW 20 MIN: CPT | Performed by: STUDENT IN AN ORGANIZED HEALTH CARE EDUCATION/TRAINING PROGRAM

## 2024-04-12 PROCEDURE — 99207 PR PRENATAL VISIT: CPT | Performed by: STUDENT IN AN ORGANIZED HEALTH CARE EDUCATION/TRAINING PROGRAM

## 2024-04-12 NOTE — PATIENT INSTRUCTIONS
"Weeks 30 to 32 of Your Pregnancy: Care Instructions  Your baby is growing more every day. Its eyes can open and close, and it may have hair on its head. Your baby may sleep 20 to 45 minutes at a time and is more active at certain times.    You should feel your baby move several times every day. Your baby now turns less and kicks more.    This is a good time to tour your hospital or birthing center. You may also want to find childcare if needed.    To ease heartburn    Avoid foods that make your symptoms worse, such as chocolate, spicy foods, and caffeine.  Avoid bending over or lying down after meals.  Do not eat for 2 hours before bedtime.  Take antacids like Tums, but don't take ones that have sodium bicarbonate, magnesium trisilicate, or aspirin.    To care for large, swollen veins (varicose veins)    Try to avoid standing for long periods of time.  Sit with your feet propped up.  Wear support hose.  Get some exercise every day, like walking or swimming.  Counting your baby's kicks  Your doctor may ask you to count your baby's movements, such as kicks, flutters, or rolls.    Find a quiet place, and get comfortable. Write down your start time. Count your baby's movements (except hiccups). When your baby has moved 10 times, you can stop counting. Write down how many minutes it took.    If an hour goes by and you don't feel 10 movements, have something to eat or drink. Count for another hour. If you don't feel at least 10 movements in the 2-hour period, call your doctor.  Follow-up care is a key part of your treatment and safety. Be sure to make and go to all appointments, and call your doctor if you are having problems. It's also a good idea to know your test results and keep a list of the medicines you take.  Where can you learn more?  Go to https://www.Plastiques Wolinakwise.net/patiented  Enter X471 in the search box to learn more about \"Weeks 30 to 32 of Your Pregnancy: Care Instructions.\"  Current as of: July 10, " "2023               Content Version: 14.0    5761-2180 [employer]el[employer].   Care instructions adapted under license by your healthcare professional. If you have questions about a medical condition or this instruction, always ask your healthcare professional. [employer] disclaims any warranty or liability for your use of this information.      Learning About Birth Control After Childbirth  What is birth control?  Birth control is any method used to prevent pregnancy. If you have vaginal sex without birth control, you could get pregnant--even if you haven't started having periods again. You're less likely to get pregnant while breastfeeding, but it's still possible. Finding birth control that works for you can help avoid an unplanned pregnancy.  There are many kinds of birth control. Each has pros and cons. Find what works for you. Talk to your doctor if you've just given birth or are breastfeeding.    Long-acting reversible contraception (LARC). These are placed inside your body by a doctor. They can prevent pregnancy for years.  Examples include:  An implant (hormonal).  Copper intrauterine device (IUD).  Hormonal IUDs.    Short-acting hormonal methods. These release hormones. Examples include:  Combination birth control pills (\"the pill\").  Skin patches.  A vaginal ring.  A shot.  Mini-pills. Choose progestin-only options soon after giving birth.    Barrier methods. Use these every time you have vaginal sex.  Examples include:  External (male) condoms.  Internal (female) condoms.  Diaphragms.  Cervical caps.  Sponges.    Spermicides. These kill sperm or stop sperm from moving. They can be gels, creams, foams, films, or tablets. Use them before vaginal sex.  Examples include:  Nonoxynol-9.  pH regulator gel.    Permanent birth control (sterilization). This can be an option if you're sure that you don't want to get pregnant later.  Examples include:  Vasectomy.  Having tubes tied (tubal " "ligation).    Emergency contraception. This is a backup method. Use it if you didn't use birth control or your birth control method failed.  Examples include:  Copper and hormonal IUDs.  Emergency contraceptive pills.    Fertility awareness. You'll learn when you are most likely to become pregnant (are fertile). You can avoid vaginal sex at that time.  It's also called:  Natural family planning.  The rhythm method.    Breastfeeding. This is most effective when all of these are true:  Your baby is younger than 6 months old.  You're breastfeeding and not bottle-feeding at all.  You aren't having periods.  Follow-up care is a key part of your treatment and safety. Be sure to make and go to all appointments, and call your doctor if you are having problems. It's also a good idea to know your test results and keep a list of the medicines you take.  Where can you learn more?  Go to https://www.InHiro.net/patiented  Enter X408 in the search box to learn more about \"Learning About Birth Control After Childbirth.\"  Current as of: July 10, 2023               Content Version: 14.0    6823-2642 Deezer.   Care instructions adapted under license by your healthcare professional. If you have questions about a medical condition or this instruction, always ask your healthcare professional. [employer] disclaims any warranty or liability for your use of this information.      "

## 2024-04-22 NOTE — PATIENT INSTRUCTIONS
"Weeks 32 to 34 of Your Pregnancy: Care Instructions    Decide whether you want to bank or donate your baby's umbilical cord blood. If you want to save this blood, you have to arrange for it ahead of time.    Decide about circumcision. Personal, Congregation, or cultural beliefs may play a role in your decision. You get to decide what you want for your baby.    Learn how to ease hemorrhoids.    Get more liquids, fruits, vegetables, and fiber in your diet.  Avoid sitting for too long.  Clean yourself with moist toilet paper. Or try witch hazel pads.  Try ice packs or warm sitz baths for discomfort.  Use hydrocortisone cream for pain or itching.  Ask your doctor about stool softeners.    Consider the benefits of breastfeeding.    It reduces your baby's risk of sudden infant death syndrome (SIDS).   babies are less likely to get certain infections. And they're less likely to be obese or get diabetes later in life.  It can lower your risk of breast and ovarian cancers and osteoporosis.  It saves you money.  Follow-up care is a key part of your treatment and safety. Be sure to make and go to all appointments, and call your doctor if you are having problems. It's also a good idea to know your test results and keep a list of the medicines you take.  Where can you learn more?  Go to https://www.SomnoMed.net/patiented  Enter X711 in the search box to learn more about \"Weeks 32 to 34 of Your Pregnancy: Care Instructions.\"  Current as of: July 10, 2023               Content Version: 14.0    0166-7821 [employer].   Care instructions adapted under license by your healthcare professional. If you have questions about a medical condition or this instruction, always ask your healthcare professional. [employer] disclaims any warranty or liability for your use of this information.      You have been provided the Any Day Now: Early Labor at Home document.    Additional copies can be found here:  " [email or web address]  You have been provided the What I'd Wish I'd Known About Giving Birth document.    Additional copies can be found here:  [email or web address]

## 2024-04-23 ENCOUNTER — PRENATAL OFFICE VISIT (OUTPATIENT)
Dept: OBGYN | Facility: CLINIC | Age: 34
End: 2024-04-23
Attending: OBSTETRICS & GYNECOLOGY
Payer: COMMERCIAL

## 2024-04-23 VITALS
WEIGHT: 170 LBS | HEART RATE: 110 BPM | DIASTOLIC BLOOD PRESSURE: 68 MMHG | SYSTOLIC BLOOD PRESSURE: 111 MMHG | HEIGHT: 64 IN | BODY MASS INDEX: 29.02 KG/M2

## 2024-04-23 DIAGNOSIS — O09.523 MULTIGRAVIDA OF ADVANCED MATERNAL AGE IN THIRD TRIMESTER: ICD-10-CM

## 2024-04-23 DIAGNOSIS — O09.529 SUPERVISION OF HIGH-RISK PREGNANCY OF ELDERLY MULTIGRAVIDA: Primary | ICD-10-CM

## 2024-04-23 PROCEDURE — 99207 PR PRENATAL VISIT: CPT | Performed by: OBSTETRICS & GYNECOLOGY

## 2024-04-23 PROCEDURE — 99213 OFFICE O/P EST LOW 20 MIN: CPT | Performed by: OBSTETRICS & GYNECOLOGY

## 2024-05-09 NOTE — PATIENT INSTRUCTIONS
Weeks 34 to 36 of Your Pregnancy: Care Instructions  Your belly has grown quite large. It's almost time to give birth! Your baby's lungs are almost ready to breathe air. The skull bones are firm enough to protect your baby's head. But they're soft enough to move down through the birth canal.    You might be wondering what to expect during labor. Because each birth is different, there's no way to know exactly what childbirth will be like for you. Talk to your doctor or midwife about any concerns you have.    You'll probably have a test for group B streptococcus (GBS). GBS is bacteria that can live in the vagina and rectum. GBS can make your baby sick after birth. If you test positive, you'll get antibiotics during labor.    Choose what type of pain relief you would like during labor.  You can choose from a few types, including medicine and non-medicine options. You may want to use several types of pain relief.     Know how medicines can help with pain during labor.  Some medicines lower anxiety and help with some of the pain. Others make your lower body numb so that you will feel less pain.     Tell your doctor about your pain medicine choice.  Do this before you start labor or very early in your labor. You may be able to change your mind during labor.     Learn about the stages of labor.    The first stage includes the early (latent) and active phases of labor. Contractions start in early labor. During active labor, contractions get stronger, last longer, and happen more often. And the cervix opens more rapidly.  The second stage starts when you're ready to push. During this stage, your baby is born.  During the third stage, you'll usually have a few more contractions to push out the placenta.   Follow-up care is a key part of your treatment and safety. Be sure to make and go to all appointments, and call your doctor if you are having problems. It's also a good idea to know your test results and keep a list of the  "medicines you take.  Where can you learn more?  Go to https://www.FittingRoom.net/patiented  Enter B912 in the search box to learn more about \"Weeks 34 to 36 of Your Pregnancy: Care Instructions.\"  Current as of: July 10, 2023               Content Version: 14.0    0017-5518 [employer].   Care instructions adapted under license by your healthcare professional. If you have questions about a medical condition or this instruction, always ask your healthcare professional. Look[employer] disclaims any warranty or liability for your use of this information.      Group B Strep During Pregnancy: Care Instructions  Overview     Group B strep infection is caused by a type of bacteria. It's a different kind of bacteria than the kind that causes strep throat.  You may have this kind of bacteria in your body. Sometimes it may cause an infection, but most of the time it doesn't make you sick or cause symptoms. But if you pass the bacteria to your baby during the birth, it can cause serious health problems for your baby.  If you have this bacteria in your body, you will get antibiotics when you are in labor. Antibiotics help prevent problems for a  baby.  After birth, doctors will watch and may test your baby. If your baby tests positive for Group B strep, your baby will get antibiotics.  If you plan to breastfeed your baby, don't worry. It will be safe to breastfeed.  Follow-up care is a key part of your treatment and safety. Be sure to make and go to all appointments, and call your doctor if you are having problems. It's also a good idea to know your test results and keep a list of the medicines you take.  How can you care for yourself at home?  If your doctor has prescribed antibiotics, take them as directed. Do not stop taking them just because you feel better. You need to take the full course of antibiotics.  Tell your doctor if you are allergic to any antibiotic.  If you go into labor, or your " "water breaks, go to the hospital. Your doctor will give you antibiotics to help protect your baby from infection.  Tell the doctors and nurses if you have an allergy to penicillin.  Tell the doctors and nurses at the hospital that you tested positive for group B strep.  When should you call for help?   Call your doctor now or seek immediate medical care if:    You have symptoms of a urinary tract infection. These may include:  Pain or burning when you urinate.  A frequent need to urinate without being able to pass much urine.  Pain in the flank, which is just below the rib cage and above the waist on either side of the back.  Blood in your urine.  A fever.     You think you are in labor or your water has broken.     You have pain in your belly or pelvis.   Watch closely for changes in your health, and be sure to contact your doctor if you have any problems.  Where can you learn more?  Go to https://www.MobSoc Media.Signal Point Holdings/patiented  Enter M001 in the search box to learn more about \"Group B Strep During Pregnancy: Care Instructions.\"  Current as of: June 12, 2023               Content Version: 14.0    0477-4036 Stellaris.   Care instructions adapted under license by your healthcare professional. If you have questions about a medical condition or this instruction, always ask your healthcare professional. Stellaris disclaims any warranty or liability for your use of this information.      Circumcision in Infants: What to Expect at Home  Your Child's Recovery  After circumcision, your baby's penis may look red and swollen. It may have petroleum jelly and gauze on it. The gauze will likely come off when your baby urinates. Follow your doctor's directions about whether to put clean gauze back on your baby's penis or to leave the gauze off. If you need to remove gauze from the penis, use warm water to soak the gauze and gently loosen it.  The doctor may have used a Plastibell device to do the " circumcision. If so, your baby will have a plastic ring around the head of the penis. The ring should fall off by itself in 10 to 12 days.  A thin, yellow film may form over the area the day after the procedure. This is part of the normal healing process. It should go away in a few days.  Your baby may seem fussy while the area heals. It may hurt for your baby to urinate. This pain often gets better in 3 or 4 days. But it may last for up to 2 weeks.  Even though your baby's penis will likely start to feel better after 3 or 4 days, it may look worse. The penis often starts to look like it's getting better after about 7 to 10 days.  This care sheet gives you a general idea about how long it will take for your child to recover. But each child recovers at a different pace. Follow the steps below to help your child get better as quickly as possible.  How can you care for your child at home?  Activity    Let your baby rest as much as possible. Sleeping will help with recovery.     You can give your baby a sponge bath the day after surgery. Ask your doctor when it is okay to give your baby a bath.   Medicines    Your doctor will tell you if and when your child can restart any medicines. The doctor will also give you instructions about your child taking any new medicines.     Your doctor may recommend giving your baby acetaminophen (Tylenol) to help with pain after the procedure. Be safe with medicines. Give your child medicines exactly as prescribed. Call your doctor if you think your child is having a problem with a medicine.     Do not give your child two or more pain medicines at the same time unless the doctor told you to. Many pain medicines have acetaminophen, which is Tylenol. Too much acetaminophen (Tylenol) can be harmful.   Circumcision care    Always wash your hands before and after touching the circumcision area.     Gently wash your baby's penis with plain, warm water after each diaper change, and pat it dry.  "Do not use soap. Don't use hydrogen peroxide or alcohol. They can slow healing.     Do not try to remove the film that forms on the penis. The film will go away on its own.     Put plenty of petroleum jelly (such as Vaseline) on the circumcision area during each diaper change. This will prevent your baby's penis from sticking to the diaper while it heals.     Fasten your baby's diapers loosely so that there is less pressure on the penis while it heals.   Follow-up care is a key part of your child's treatment and safety. Be sure to make and go to all appointments, and call your doctor if your child is having problems. It's also a good idea to know your child's test results and keep a list of the medicines your child takes.  When should you call for help?   Call your doctor now or seek immediate medical care if:    Your baby has a fever over 100.4 F.     Your baby is extremely fussy or irritable, has a high-pitched cry, or refuses to eat.     Your baby does not have a wet diaper within 12 hours after the circumcision.     You find a spot of bleeding larger than a 2-inch [place] from the incision.     Your baby has signs of infection. Signs may include severe swelling; redness; a red streak on the shaft of the penis; or a thick, yellow discharge.   Watch closely for changes in your child's health, and be sure to contact your doctor if:    A Plastibell device was used for the circumcision and the ring has not fallen off after 10 to 12 days.   Where can you learn more?  Go to https://www.Jazz Pharmaceuticals.net/patiented  Enter S255 in the search box to learn more about \"Circumcision in Infants: What to Expect at Home.\"  Current as of: October 24, 2023               Content Version: 14.0    3770-1938 [employer].   Care instructions adapted under license by your healthcare professional. If you have questions about a medical condition or this instruction, always ask your healthcare professional. [employer] Terra, " Incorporated disclaims any warranty or liability for your use of this information.

## 2024-05-10 ENCOUNTER — OFFICE VISIT (OUTPATIENT)
Dept: FAMILY MEDICINE | Facility: CLINIC | Age: 34
End: 2024-05-10
Payer: COMMERCIAL

## 2024-05-10 ENCOUNTER — PRENATAL OFFICE VISIT (OUTPATIENT)
Dept: OBGYN | Facility: CLINIC | Age: 34
End: 2024-05-10
Attending: MIDWIFE
Payer: COMMERCIAL

## 2024-05-10 VITALS
SYSTOLIC BLOOD PRESSURE: 112 MMHG | BODY MASS INDEX: 29.06 KG/M2 | WEIGHT: 170 LBS | DIASTOLIC BLOOD PRESSURE: 76 MMHG | HEART RATE: 78 BPM

## 2024-05-10 VITALS
OXYGEN SATURATION: 98 % | HEART RATE: 83 BPM | BODY MASS INDEX: 29.19 KG/M2 | TEMPERATURE: 98.5 F | RESPIRATION RATE: 19 BRPM | SYSTOLIC BLOOD PRESSURE: 110 MMHG | WEIGHT: 171 LBS | HEIGHT: 64 IN | DIASTOLIC BLOOD PRESSURE: 75 MMHG

## 2024-05-10 DIAGNOSIS — O09.523 MULTIGRAVIDA OF ADVANCED MATERNAL AGE IN THIRD TRIMESTER: ICD-10-CM

## 2024-05-10 DIAGNOSIS — O09.529 SUPERVISION OF HIGH-RISK PREGNANCY OF ELDERLY MULTIGRAVIDA: Primary | ICD-10-CM

## 2024-05-10 DIAGNOSIS — Z00.00 ROUTINE GENERAL MEDICAL EXAMINATION AT A HEALTH CARE FACILITY: Primary | ICD-10-CM

## 2024-05-10 PROCEDURE — 99213 OFFICE O/P EST LOW 20 MIN: CPT | Performed by: MIDWIFE

## 2024-05-10 PROCEDURE — 99207 PR PRENATAL VISIT: CPT | Performed by: MIDWIFE

## 2024-05-10 PROCEDURE — 99395 PREV VISIT EST AGE 18-39: CPT | Performed by: NURSE PRACTITIONER

## 2024-05-10 SDOH — HEALTH STABILITY: PHYSICAL HEALTH: ON AVERAGE, HOW MANY DAYS PER WEEK DO YOU ENGAGE IN MODERATE TO STRENUOUS EXERCISE (LIKE A BRISK WALK)?: 2 DAYS

## 2024-05-10 ASSESSMENT — PAIN SCALES - GENERAL
PAINLEVEL: NO PAIN (0)
PAINLEVEL: NO PAIN (0)

## 2024-05-10 ASSESSMENT — SOCIAL DETERMINANTS OF HEALTH (SDOH): HOW OFTEN DO YOU GET TOGETHER WITH FRIENDS OR RELATIVES?: ONCE A WEEK

## 2024-05-10 NOTE — PROGRESS NOTES
Subjective:      33 year old  at 34w4d presentst for a routine prenatal appointment.     no vaginal bleeding or leakage of fluid.  Denies concerning contractions or cramping.   Reports regular fetal movement.       No HA, visual changes, RUQ or epigastric pain.  Patient concerns: both parents travelled from [place] to visit for a while and help with new  baby   daughter met for first time     Appetite is good  TWG 33  stablized   Plans epidural in labor   Objective:  Vitals:    05/10/24 1054   BP: 112/76   Pulse: 78   Weight: 77.1 kg (170 lb)   , see ob flowsheet    Blood type: B POS   Assessment/Plan     Patient Active Problem List    Diagnosis Date Noted    Supervision of high-risk pregnancy of elderly multigravida 10/27/2023     Priority: High     [facility] considering  Partner's name:   [x]NOB folder  [x]Dating  [x] 1st trimester screening: MFM referral placed for 1st trimester screening place  [x]Offer AFP after 15 wks  [x]Fetal anatomy US ordered  []Rubella immune  []Hep B immune (if nonimmune, enter dx)  []Varicella immune  [x]Pap  [x] recommended LD ASA after 12 wks for PRE-E risk  [x] No increased risk for GDM  [x]No need for utox in labor  [x]COVID vaccine completed-encouraged 2023 update  _____________________________________  []EOB folder  []PP Contraception plan: If tubal,consent date:  []Labor plans:  []:  []Infant feeding plan  [x]FLU shot  []TDAP given  []RSV  []Rhogam if needed, date:  []TOLAC consent done  [] Water birth interest  []GCT, passed  ________________________________________  [] OTC PP meds sent  []PP plans, time off, support system discussed, resources offered  []Planning CS-ERAS pkt        AMA (advanced maternal age) multigravida 35+ 10/27/2023     Priority: Medium    Family history of heart block 10/29/2019     Priority: Medium     10/29/2019 - Pt's mother had heart block discovered during her delivery/labor. Was seen by Dr. Hicks who ordered echo, EKG,  TSH.    Maternal echo wnl, EF 60-65%; EKG wnl  TSH 3.46 on 11/27/19; repeat with 28wk labs (next visit).      history of cystic hygroma 10/09/2019     Priority: Medium     First pregnancy 2018         No orders of the defined types were placed in this encounter.      Updated individualized prenatal care plan on the problem list for 3rd trimester    Return to clinic in 2 weeks and prn if questions or concerns.     MAMADOU Briggs CNM

## 2024-05-10 NOTE — PATIENT INSTRUCTIONS
Advanced Care Directive resources  Consider making an advanced care directive - this is a good site for assistance and resources   [email or web address]    Look at your childhood vaccine book for   Hepatitis A and Hepatitis B

## 2024-05-10 NOTE — PROGRESS NOTES
"Preventive Care Visit  [facility]  MAMADOU Horvath CNP, Nurse Practitioner - Family  May 10, 2024      Assessment & Plan     Routine general medical examination at a health care facility  34 weeks pregnant today. Defer any labs. Did have normal glucose screening test. Look at immunization records to see if have received Hep A and B.    Patient has been advised of split billing requirements and indicates understanding: Yes          BMI  Estimated body mass index is 29.23 kg/m  as calculated from the following:    Height as of this encounter: 1.629 m (5' 4.13\").    Weight as of this encounter: 77.6 kg (171 lb).   Weight management plan: is pregnant    Counseling  Appropriate preventive services were discussed with this patient, including applicable screening as appropriate for fall prevention, nutrition, physical activity, Tobacco-use cessation, weight loss and cognition.  Checklist reviewing preventive services available has been given to the patient.  Reviewed patient's diet, addressing concerns and/or questions.   She is at risk for lack of exercise and has been provided with information to increase physical activity for the benefit of her well-being.       Subjective   Hakeem is a 33 year old, presenting for the following:  Physical        5/10/2024     9:34 AM   Additional Questions   Roomed by Akilah Farley        Health Care Directive  Patient does not have a Health Care Directive or Living Will: Discussed advance care planning with patient; information given to patient to review.    HPI    Pregnant 34w4d  Gained a lot of weight in pregnancy - 25 lbs, prenatal providers not concerned and Hakeem passed glucose test  Tired at night, but otherwise feeling good          5/10/2024   General Health   How would you rate your overall physical health? Excellent   Feel stress (tense, anxious, or unable to sleep) Not at all         5/10/2024   Nutrition   Three or more servings of " calcium each day? Yes   Diet: Regular (no restrictions)   How many servings of fruit and vegetables per day? (!) 2-3   How many sweetened beverages each day? 0-1         5/10/2024   Exercise   Days per week of moderate/strenous exercise 2 days   (!) EXERCISE CONCERN      5/10/2024   Social Factors   Frequency of gathering with friends or relatives Once a week   Worry food won't last until get money to buy more No   Food not last or not have enough money for food? No   Do you have housing?  Yes   Are you worried about losing your housing? No   Lack of transportation? No   Unable to get utilities (heat,electricity)? No         5/10/2024   Dental   Dentist two times every year? Yes         5/10/2024   TB Screening   Were you born outside of the [place]? Yes       Today's PHQ-2 Score:       1/15/2024     2:57 PM   PHQ-2 ( 1999 [employer])   Q1: Little interest or pleasure in doing things 0   Q2: Feeling down, depressed or hopeless 0   PHQ-2 Score 0         5/10/2024   Substance Use   Alcohol more than 3/day or more than 7/wk Not Applicable   Do you use any other substances recreationally? No     Social History     Tobacco Use    Smoking status: Never    Smokeless tobacco: Never   Vaping Use    Vaping status: Never Used   Substance Use Topics    Alcohol use: No    Drug use: No       Mammogram Screening - Patient under 40 years of age: Routine Mammogram Screening not recommended.         5/10/2024   STI Screening   New sexual partner(s) since last STI/HIV test? No     History of abnormal Pap smear: NO - age 30-65 PAP every 5 years with negative HPV co-testing recommended        Latest Ref Rng & Units 2/15/2021    12:03 PM 2/12/2021     3:00 PM 4/13/2018    12:00 AM   PAP / HPV   PAP (Historical)  NIL   wnl   in careeverywhere       HPV 16 DNA NEG^Negative  Negative     HPV 18 DNA NEG^Negative  Negative     Other HR HPV NEG^Negative  Negative         This result is from an external source.           5/10/2024   Contraception/Family  "Planning   Questions about contraception or family planning No     Reviewed and updated as needed this visit by Provider   Tobacco      Surg Hx  Fam Hx  Soc Hx Sexual Activity          Past Medical History:   Diagnosis Date    NO ACTIVE PROBLEMS     Varicella      Past Surgical History:   Procedure Laterality Date    C EACH ADD TOOTH EXTRACTION      DILATION AND EVACUATION N/A 01/04/2019    Procedure: DILATION AND EVACUATION;  Surgeon: Meghana Hoyos MD;  Location: [facility] OR    FOOT SURGERY      GUM SURGERY  2023    oral surgery         Review of Systems  Constitutional, neuro, ENT, endocrine, pulmonary, cardiac, gastrointestinal, genitourinary, musculoskeletal, integument and psychiatric systems are negative, except as otherwise noted.     Objective    Exam  /75   Pulse 83   Temp 98.5  F (36.9  C) (Temporal)   Resp 19   Ht 1.629 m (5' 4.13\")   Wt 77.6 kg (171 lb)   LMP 09/11/2023 (Exact Date)   SpO2 98%   BMI 29.23 kg/m     Estimated body mass index is 29.23 kg/m  as calculated from the following:    Height as of this encounter: 1.629 m (5' 4.13\").    Weight as of this encounter: 77.6 kg (171 lb).    Physical Exam  GENERAL: alert and no distress  EYES: Eyes grossly normal to inspection, PERRL and conjunctivae and sclerae normal  HENT: ear canals and TM's normal, nose and mouth without ulcers or lesions  NECK: no adenopathy, no asymmetry, masses, or scars  RESP: lungs clear to auscultation - no rales, rhonchi or wheezes  CV: regular rate and rhythm, normal S1 S2, no S3 or S4, no murmur, click or rub, no peripheral edema   ABDOMEN: gravid  MS: no gross musculoskeletal defects noted, no edema  SKIN: no suspicious lesions or rashes  NEURO: Normal strength and tone, mentation intact and speech normal  PSYCH: mentation appears normal, affect normal/bright        Signed Electronically by: MAMADUO Horvath CNP    "

## 2024-05-20 NOTE — PATIENT INSTRUCTIONS
"Week 37 of Your Pregnancy: Care Instructions    Most babies are born between 37 and 40 weeks.    This is a good time to pack a bag to take with you to the birth. Then it will be ready to go when you are.    Learn about breastfeeding.  For example, find out about ways to hold your baby to make breastfeeding easier. And think about learning how to pump and store milk.     Know that crying is normal.  It's common for babies to cry 1 to 3 hours a day. Some cry more, and some cry less.     Learn why babies cry.  They may be hungry; have gas; need a diaper change; or feel cold, warm, tired, lonely, or tense. Sometimes they cry for unknown reasons.     Think about what will help you stay calm when your baby cries.  Taking slow, deep breaths can help. So can taking a break. It's okay to put your baby somewhere safe (like their crib) and walk away for a few minutes.     Learn about safe sleep for your baby.  Always put your baby to sleep on their back. Place them alone in a crib or bassinet with a firm, flat surface. Keep soft items like stuffed animals out of the crib.     Learn what to expect with  poop.  Your baby will have their own bowel patterns. Some babies have several bowel movements a day. Some have fewer.     Know that  babies will often have loose, yellow bowel movements.  Formula-fed babies have more formed stools. If your baby's poop looks like pellets, your baby is constipated.   Follow-up care is a key part of your treatment and safety. Be sure to make and go to all appointments, and call your doctor if you are having problems. It's also a good idea to know your test results and keep a list of the medicines you take.  Where can you learn more?  Go to https://www.[employer].net/patiented  Enter N257 in the search box to learn more about \"Week 37 of Your Pregnancy: Care Instructions.\"  Current as of: July 10, 2023               Content Version: 14.0    7683-7289 [employer].   Care " instructions adapted under license by your healthcare professional. If you have questions about a medical condition or this instruction, always ask your healthcare professional. [employer] disclaims any warranty or liability for your use of this information.

## 2024-05-21 ENCOUNTER — MYC MEDICAL ADVICE (OUTPATIENT)
Dept: FAMILY MEDICINE | Facility: CLINIC | Age: 34
End: 2024-05-21
Payer: COMMERCIAL

## 2024-05-21 ENCOUNTER — PRENATAL OFFICE VISIT (OUTPATIENT)
Dept: OBGYN | Facility: CLINIC | Age: 34
End: 2024-05-21
Attending: OBSTETRICS & GYNECOLOGY
Payer: COMMERCIAL

## 2024-05-21 VITALS
BODY MASS INDEX: 29.86 KG/M2 | DIASTOLIC BLOOD PRESSURE: 68 MMHG | SYSTOLIC BLOOD PRESSURE: 104 MMHG | HEART RATE: 84 BPM | WEIGHT: 174.9 LBS | HEIGHT: 64 IN

## 2024-05-21 DIAGNOSIS — O09.523 MULTIGRAVIDA OF ADVANCED MATERNAL AGE IN THIRD TRIMESTER: ICD-10-CM

## 2024-05-21 DIAGNOSIS — O09.529 SUPERVISION OF HIGH-RISK PREGNANCY OF ELDERLY MULTIGRAVIDA: Primary | ICD-10-CM

## 2024-05-21 PROCEDURE — 99213 OFFICE O/P EST LOW 20 MIN: CPT | Performed by: OBSTETRICS & GYNECOLOGY

## 2024-05-21 PROCEDURE — 99207 PR PRENATAL VISIT: CPT | Performed by: OBSTETRICS & GYNECOLOGY

## 2024-05-21 PROCEDURE — 87653 STREP B DNA AMP PROBE: CPT | Performed by: OBSTETRICS & GYNECOLOGY

## 2024-05-21 ASSESSMENT — PAIN SCALES - GENERAL: PAINLEVEL: NO PAIN (0)

## 2024-05-21 NOTE — PROGRESS NOTES
[facility]  Return OB Visit    S: Hakeem Meade is a 33 year old  at 36w1d by LMP who is here for a return OB visit.    Denies vaginal bleeding, vaginal discharge, LOF, contractions.  Reports good fetal movement. ***No headache, vision changes, RUQ pain, chest pain, SOB.    Pregnancy notable for  - hx of D&X for 45x    O: LMP 2023 (Exact Date)   Weight gain: 15 kg (33 lb)    Gen: Well-appearing, NAD  Cervix: ***  Fundal Height:  ***  FHR: ***    A/P:  33 year old  at 36w1d who is here for a return OB visit.    Prenatal care  - Rh pos, Antibody neg  - RPR neg, HIV non-reactive, Hep B non-reactive, Rubella immune  - Anatomy US ***, placenta ***  - GCT wnl,   - Immunizations: flu, Tdap, COVID  - GBS to be performed at 36 weeks***  - Appropriate*** weight gain (pre-pregnancy BMI 23.42)   {2-4/4-8 lb during first trimester, then 1 pound per week after  Pre-pregnancy BMI Awtts Multiple   BMI < 18.5 28-40 lb 50-62 lb   BMI 18.5-25 25-35 lb 37-54 lb   BMI 25-30 15-25 lb 31-50 lb   BMI > 30 11-20 lb 25-42 lb   }      *** other issues    ***36 wks***  Birth and postpartum preferences  - Discussed when to present to triage, labor warning signs  - Discussed pain management options, patient plans ***  - Plans *** feeding  - Discussed pediatrician plans, patient is interested in ***  - Post-partum contraception ***  ***    Return to clinic in *** weeks. Discussed return precautions.    Staffed with Dr. Martin Robison, MS3

## 2024-05-21 NOTE — PROGRESS NOTES
"BROCK    S: Doing well. Good movement. No ctx, lof, vb. Does feel \"like period is coming.\" No headache or heartburn. Has questions about  circumcision. Swelling is stable.     O: /68   Pulse 84   Ht 1.629 m (5' 4.13\")   Wt 79.3 kg (174 lb 14.4 oz)   LMP 2023 (Exact Date)   BMI 29.90 kg/m      Cvx: /-3 cephalic    A/P: 33 year old  @36w1d   GBS collected  Reviewed  circumcision timing if desires, no medical indication is patient preference- encouraged to speak to pediatrician.   Labor precautions, kick counts reviewed  RTC 1 week    Tomeka Salazar MD    "

## 2024-05-22 LAB — GP B STREP DNA SPEC QL NAA+PROBE: NEGATIVE

## 2024-05-24 NOTE — PATIENT INSTRUCTIONS
Week 38 of Your Pregnancy: Care Instructions  Believe it or not, your baby is almost here. You may notice how your baby responds to sounds, warmth, cold, and light. You may even know what kind of music your baby likes.    Even if you expect a vaginal birth, it's a good idea to learn about  section ().  is the delivery of a baby through a cut (incision) in your belly. It's a major surgery, so it has more risks than a vaginal birth.    During the first 2 weeks after the birth, limit visitors. Don't allow visitors who have colds or infections. Ask visitors to wash their hands before they touch your baby. And never let anyone smoke around your baby.    Know about unplanned C-sections.  Reasons for an unplanned  include labor that slows or stops, signs of distress in your baby, and high blood pressure or other problems for you.     Know about planned C-sections.  If your baby isn't in a head-down position for delivery (breech position), your doctor may plan a . Or you may have a planned  if you're having twins or more.     Get as much help as you can while you're in the hospital.  You can learn about feeding, diapering, and bathing your baby.     Talk to your doctor or midwife about how to care for the umbilical cord stump.  If your baby will be circumcised, also ask about how to care for that.     Ask friends or family for help, as you need it.  If you can, have another adult in your home for at least 2 or 3 days after the birth.     Try to nap when your baby naps.  This may be your best chance to get some sleep.     Watch for changes in your mental health.  For the first 1 to 2 weeks after birth, it's common to cry or feel sad or irritable. If these feelings last for more than 2 weeks, you may have postpartum depression. Ask your doctor for help. Postpartum depression can be treated.   Follow-up care is a key part of your treatment and safety. Be sure to make and go  "to all appointments, and call your doctor if you are having problems. It's also a good idea to know your test results and keep a list of the medicines you take.  Where can you learn more?  Go to https://www.[employer].net/patiented  Enter B044 in the search box to learn more about \"Week 38 of Your Pregnancy: Care Instructions.\"  Current as of: July 10, 2023               Content Version: 14.0    1586-2517 [employer].   Care instructions adapted under license by your healthcare professional. If you have questions about a medical condition or this instruction, always ask your healthcare professional. [employer] disclaims any warranty or liability for your use of this information.      "

## 2024-05-28 ENCOUNTER — PRENATAL OFFICE VISIT (OUTPATIENT)
Dept: OBGYN | Facility: CLINIC | Age: 34
End: 2024-05-28
Attending: OBSTETRICS & GYNECOLOGY
Payer: COMMERCIAL

## 2024-05-28 VITALS
SYSTOLIC BLOOD PRESSURE: 105 MMHG | DIASTOLIC BLOOD PRESSURE: 68 MMHG | BODY MASS INDEX: 30.9 KG/M2 | WEIGHT: 181 LBS | HEIGHT: 64 IN | HEART RATE: 89 BPM

## 2024-05-28 DIAGNOSIS — O09.523 MULTIGRAVIDA OF ADVANCED MATERNAL AGE IN THIRD TRIMESTER: ICD-10-CM

## 2024-05-28 DIAGNOSIS — O09.529 SUPERVISION OF HIGH-RISK PREGNANCY OF ELDERLY MULTIGRAVIDA: Primary | ICD-10-CM

## 2024-05-28 PROCEDURE — 99213 OFFICE O/P EST LOW 20 MIN: CPT | Performed by: OBSTETRICS & GYNECOLOGY

## 2024-05-28 PROCEDURE — 99207 PR PRENATAL VISIT: CPT | Performed by: OBSTETRICS & GYNECOLOGY

## 2024-05-28 NOTE — PROGRESS NOTES
"BROCK    S: Doing well. Swelling on/off, increased today. Denies headache or preeclampsia symptoms. Good movement. No ctx, lof, vb.     O: /68   Pulse 89   Ht 1.629 m (5' 4.13\")   Wt 82.1 kg (181 lb)   LMP 2023 (Exact Date)   BMI 30.94 kg/m      BSUS: Cephalic, grossly normal fluid    A/P: 33 year old  @37w1d   Inc weight gain: 7lbs since last week, notes increased swelling in hands feet. Will monitor.   GBS negative  Discussed IOL by 41wks  Reviewed labor precautions, fetal movement  RTC 1 week    Tomeka Salazar MD    "

## 2024-05-29 ENCOUNTER — VIRTUAL VISIT (OUTPATIENT)
Dept: FAMILY MEDICINE | Facility: CLINIC | Age: 34
End: 2024-05-29
Payer: COMMERCIAL

## 2024-05-29 ENCOUNTER — MYC MEDICAL ADVICE (OUTPATIENT)
Dept: FAMILY MEDICINE | Facility: CLINIC | Age: 34
End: 2024-05-29

## 2024-05-29 DIAGNOSIS — Z76.81 COUNSELING OF EXPECTANT PARENTS AT PEDIATRIC PRE-BIRTH VISIT: Primary | ICD-10-CM

## 2024-05-29 PROCEDURE — 99214 OFFICE O/P EST MOD 30 MIN: CPT | Mod: 95 | Performed by: NURSE PRACTITIONER

## 2024-05-29 NOTE — TELEPHONE ENCOUNTER
Mom calling to confirm she calls to schedule  visit after baby is born. Provided number to schedule with TC once baby is born    Francesca AMARO RN  [facility]

## 2024-05-29 NOTE — PROGRESS NOTES
Hakeem is a 33 year old who is being evaluated via a billable video visit.    How would you like to obtain your AVS? MyChart  If the video visit is dropped, the invitation should be resent by: Text to cell phone: [phone]  Will anyone else be joining your video visit? No      Assessment & Plan     Counseling of expectant parents at pediatric pre-birth visit  Discussed risks, benefits and preferences around circumcision.    I spent a total of 32 minutes on the day of the visit.   Time spent by me doing chart review, history and exam, documentation and further activities per the note        Subjective   Hkaeem is a 33 year old, presenting for the following health issues:  Prenatal Care    HPI     Had questions about circumcision for expected baby boy. Circumcision is not [place] tradition, but wondering about if there are health benefits. Infant's father had circumcision at age 13 due to phimosis. Mother leaning against circumcision and father toward circumcision.           Objective           Vitals:  No vitals were obtained today due to virtual visit.    Physical Exam   GENERAL: alert and no distress  EYES: Eyes grossly normal to inspection.  No discharge or erythema, or obvious scleral/conjunctival abnormalities.  RESP: No audible wheeze, cough, or visible cyanosis.    SKIN: Visible skin clear. No significant rash, abnormal pigmentation or lesions.  NEURO: Cranial nerves grossly intact.  Mentation and speech appropriate for age.  PSYCH: Appropriate affect, tone, and pace of words          Video-Visit Details    Type of service:  Video Visit   Originating Location (pt. Location): Home  Distant Location (provider location):  On-site  Platform used for Video Visit: Usha  Signed Electronically by: MAMADOU Horvath CNP

## 2024-06-05 ENCOUNTER — ANESTHESIA (OUTPATIENT)
Dept: OBGYN | Facility: CLINIC | Age: 34
End: 2024-06-05
Payer: COMMERCIAL

## 2024-06-05 ENCOUNTER — ANESTHESIA EVENT (OUTPATIENT)
Dept: OBGYN | Facility: CLINIC | Age: 34
End: 2024-06-05
Payer: COMMERCIAL

## 2024-06-05 ENCOUNTER — HOSPITAL ENCOUNTER (INPATIENT)
Facility: CLINIC | Age: 34
LOS: 1 days | Discharge: HOME HEALTH | End: 2024-06-06
Attending: OBSTETRICS & GYNECOLOGY | Admitting: OBSTETRICS & GYNECOLOGY
Payer: COMMERCIAL

## 2024-06-05 DIAGNOSIS — O92.70 LACTATION PROBLEM: ICD-10-CM

## 2024-06-05 PROBLEM — Z34.90 PREGNANCY: Status: ACTIVE | Noted: 2024-06-05

## 2024-06-05 LAB
ABO/RH(D): NORMAL
ANTIBODY SCREEN: NEGATIVE
HGB BLD-MCNC: 13.1 G/DL (ref 11.7–15.7)
PLATELET # BLD AUTO: 151 10E3/UL (ref 150–450)
SPECIMEN EXPIRATION DATE: NORMAL
T PALLIDUM AB SER QL: NONREACTIVE

## 2024-06-05 PROCEDURE — 85049 AUTOMATED PLATELET COUNT: CPT

## 2024-06-05 PROCEDURE — 59400 OBSTETRICAL CARE: CPT | Mod: GC | Performed by: OBSTETRICS & GYNECOLOGY

## 2024-06-05 PROCEDURE — 722N000001 HC LABOR CARE VAGINAL DELIVERY SINGLE

## 2024-06-05 PROCEDURE — 258N000003 HC RX IP 258 OP 636

## 2024-06-05 PROCEDURE — 86780 TREPONEMA PALLIDUM: CPT

## 2024-06-05 PROCEDURE — 250N000009 HC RX 250

## 2024-06-05 PROCEDURE — 250N000011 HC RX IP 250 OP 636

## 2024-06-05 PROCEDURE — 3E0R3BZ INTRODUCTION OF ANESTHETIC AGENT INTO SPINAL CANAL, PERCUTANEOUS APPROACH: ICD-10-PCS | Performed by: ANESTHESIOLOGY

## 2024-06-05 PROCEDURE — 59400 OBSTETRICAL CARE: CPT | Performed by: ANESTHESIOLOGY

## 2024-06-05 PROCEDURE — 0KQM0ZZ REPAIR PERINEUM MUSCLE, OPEN APPROACH: ICD-10-PCS | Performed by: OBSTETRICS & GYNECOLOGY

## 2024-06-05 PROCEDURE — 00HU33Z INSERTION OF INFUSION DEVICE INTO SPINAL CANAL, PERCUTANEOUS APPROACH: ICD-10-PCS | Performed by: ANESTHESIOLOGY

## 2024-06-05 PROCEDURE — 370N000003 HC ANESTHESIA WARD SERVICE: Performed by: ANESTHESIOLOGY

## 2024-06-05 PROCEDURE — 85018 HEMOGLOBIN: CPT

## 2024-06-05 PROCEDURE — 258N000003 HC RX IP 258 OP 636: Performed by: ANESTHESIOLOGY

## 2024-06-05 PROCEDURE — 120N000002 HC R&B MED SURG/OB UMMC

## 2024-06-05 PROCEDURE — 86900 BLOOD TYPING SEROLOGIC ABO: CPT

## 2024-06-05 PROCEDURE — 250N000011 HC RX IP 250 OP 636: Performed by: ANESTHESIOLOGY

## 2024-06-05 RX ORDER — CITRIC ACID/SODIUM CITRATE 334-500MG
30 SOLUTION, ORAL ORAL ONCE
Status: DISCONTINUED | OUTPATIENT
Start: 2024-06-05 | End: 2024-06-05

## 2024-06-05 RX ORDER — LOPERAMIDE HCL 2 MG
4 CAPSULE ORAL
Status: DISCONTINUED | OUTPATIENT
Start: 2024-06-05 | End: 2024-06-05

## 2024-06-05 RX ORDER — CARBOPROST TROMETHAMINE 250 UG/ML
250 INJECTION, SOLUTION INTRAMUSCULAR
Status: DISCONTINUED | OUTPATIENT
Start: 2024-06-05 | End: 2024-06-05

## 2024-06-05 RX ORDER — MISOPROSTOL 200 UG/1
400 TABLET ORAL
Status: DISCONTINUED | OUTPATIENT
Start: 2024-06-05 | End: 2024-06-05

## 2024-06-05 RX ORDER — OXYTOCIN 10 [USP'U]/ML
10 INJECTION, SOLUTION INTRAMUSCULAR; INTRAVENOUS
Status: DISCONTINUED | OUTPATIENT
Start: 2024-06-05 | End: 2024-06-06 | Stop reason: HOSPADM

## 2024-06-05 RX ORDER — OXYTOCIN/0.9 % SODIUM CHLORIDE 30/500 ML
100-340 PLASTIC BAG, INJECTION (ML) INTRAVENOUS CONTINUOUS PRN
Status: DISCONTINUED | OUTPATIENT
Start: 2024-06-05 | End: 2024-06-05

## 2024-06-05 RX ORDER — OXYTOCIN 10 [USP'U]/ML
10 INJECTION, SOLUTION INTRAMUSCULAR; INTRAVENOUS
Status: DISCONTINUED | OUTPATIENT
Start: 2024-06-05 | End: 2024-06-05

## 2024-06-05 RX ORDER — SODIUM CHLORIDE, SODIUM LACTATE, POTASSIUM CHLORIDE, CALCIUM CHLORIDE 600; 310; 30; 20 MG/100ML; MG/100ML; MG/100ML; MG/100ML
INJECTION, SOLUTION INTRAVENOUS CONTINUOUS
Status: DISCONTINUED | OUTPATIENT
Start: 2024-06-05 | End: 2024-06-05

## 2024-06-05 RX ORDER — FENTANYL CITRATE-0.9 % NACL/PF 10 MCG/ML
100 PLASTIC BAG, INJECTION (ML) INTRAVENOUS EVERY 5 MIN PRN
Status: DISCONTINUED | OUTPATIENT
Start: 2024-06-05 | End: 2024-06-05

## 2024-06-05 RX ORDER — IBUPROFEN 800 MG/1
800 TABLET, FILM COATED ORAL EVERY 6 HOURS PRN
Status: DISCONTINUED | OUTPATIENT
Start: 2024-06-05 | End: 2024-06-06 | Stop reason: HOSPADM

## 2024-06-05 RX ORDER — IBUPROFEN 600 MG/1
600 TABLET, FILM COATED ORAL EVERY 6 HOURS PRN
Qty: 60 TABLET | Refills: 0 | Status: SHIPPED | OUTPATIENT
Start: 2024-06-05

## 2024-06-05 RX ORDER — ONDANSETRON 2 MG/ML
4 INJECTION INTRAMUSCULAR; INTRAVENOUS EVERY 6 HOURS PRN
Status: DISCONTINUED | OUTPATIENT
Start: 2024-06-05 | End: 2024-06-05

## 2024-06-05 RX ORDER — DOCUSATE SODIUM 100 MG/1
100 CAPSULE, LIQUID FILLED ORAL DAILY
Status: DISCONTINUED | OUTPATIENT
Start: 2024-06-05 | End: 2024-06-06 | Stop reason: HOSPADM

## 2024-06-05 RX ORDER — BISACODYL 10 MG
10 SUPPOSITORY, RECTAL RECTAL DAILY PRN
Status: DISCONTINUED | OUTPATIENT
Start: 2024-06-05 | End: 2024-06-06 | Stop reason: HOSPADM

## 2024-06-05 RX ORDER — NALOXONE HYDROCHLORIDE 0.4 MG/ML
0.2 INJECTION, SOLUTION INTRAMUSCULAR; INTRAVENOUS; SUBCUTANEOUS
Status: DISCONTINUED | OUTPATIENT
Start: 2024-06-05 | End: 2024-06-05

## 2024-06-05 RX ORDER — METHYLERGONOVINE MALEATE 0.2 MG/ML
200 INJECTION INTRAVENOUS
Status: DISCONTINUED | OUTPATIENT
Start: 2024-06-05 | End: 2024-06-05

## 2024-06-05 RX ORDER — METOCLOPRAMIDE HYDROCHLORIDE 5 MG/ML
10 INJECTION INTRAMUSCULAR; INTRAVENOUS EVERY 6 HOURS PRN
Status: DISCONTINUED | OUTPATIENT
Start: 2024-06-05 | End: 2024-06-05

## 2024-06-05 RX ORDER — ONDANSETRON 4 MG/1
4 TABLET, ORALLY DISINTEGRATING ORAL EVERY 6 HOURS PRN
Status: DISCONTINUED | OUTPATIENT
Start: 2024-06-05 | End: 2024-06-05

## 2024-06-05 RX ORDER — AMOXICILLIN 250 MG
1 CAPSULE ORAL DAILY
Qty: 100 TABLET | Refills: 0 | Status: SHIPPED | OUTPATIENT
Start: 2024-06-05

## 2024-06-05 RX ORDER — LOPERAMIDE HCL 2 MG
2 CAPSULE ORAL
Status: DISCONTINUED | OUTPATIENT
Start: 2024-06-05 | End: 2024-06-06 | Stop reason: HOSPADM

## 2024-06-05 RX ORDER — PROCHLORPERAZINE MALEATE 10 MG
10 TABLET ORAL EVERY 6 HOURS PRN
Status: DISCONTINUED | OUTPATIENT
Start: 2024-06-05 | End: 2024-06-05

## 2024-06-05 RX ORDER — ERYTHROMYCIN 5 MG/G
OINTMENT OPHTHALMIC
Status: DISCONTINUED
Start: 2024-06-05 | End: 2024-06-05 | Stop reason: HOSPADM

## 2024-06-05 RX ORDER — KETOROLAC TROMETHAMINE 30 MG/ML
30 INJECTION, SOLUTION INTRAMUSCULAR; INTRAVENOUS
Status: DISCONTINUED | OUTPATIENT
Start: 2024-06-05 | End: 2024-06-05

## 2024-06-05 RX ORDER — METOCLOPRAMIDE 10 MG/1
10 TABLET ORAL EVERY 6 HOURS PRN
Status: DISCONTINUED | OUTPATIENT
Start: 2024-06-05 | End: 2024-06-05

## 2024-06-05 RX ORDER — FENTANYL CITRATE 50 UG/ML
100 INJECTION, SOLUTION INTRAMUSCULAR; INTRAVENOUS
Status: DISCONTINUED | OUTPATIENT
Start: 2024-06-05 | End: 2024-06-05

## 2024-06-05 RX ORDER — LOPERAMIDE HCL 2 MG
4 CAPSULE ORAL
Status: DISCONTINUED | OUTPATIENT
Start: 2024-06-05 | End: 2024-06-06 | Stop reason: HOSPADM

## 2024-06-05 RX ORDER — MISOPROSTOL 200 UG/1
800 TABLET ORAL
Status: DISCONTINUED | OUTPATIENT
Start: 2024-06-05 | End: 2024-06-06 | Stop reason: HOSPADM

## 2024-06-05 RX ORDER — TRANEXAMIC ACID 10 MG/ML
1 INJECTION, SOLUTION INTRAVENOUS EVERY 30 MIN PRN
Status: DISCONTINUED | OUTPATIENT
Start: 2024-06-05 | End: 2024-06-05

## 2024-06-05 RX ORDER — NALBUPHINE HYDROCHLORIDE 20 MG/ML
2.5-5 INJECTION, SOLUTION INTRAMUSCULAR; INTRAVENOUS; SUBCUTANEOUS EVERY 6 HOURS PRN
Status: DISCONTINUED | OUTPATIENT
Start: 2024-06-05 | End: 2024-06-05

## 2024-06-05 RX ORDER — LOPERAMIDE HCL 2 MG
2 CAPSULE ORAL
Status: DISCONTINUED | OUTPATIENT
Start: 2024-06-05 | End: 2024-06-05

## 2024-06-05 RX ORDER — CARBOPROST TROMETHAMINE 250 UG/ML
250 INJECTION, SOLUTION INTRAMUSCULAR
Status: DISCONTINUED | OUTPATIENT
Start: 2024-06-05 | End: 2024-06-06 | Stop reason: HOSPADM

## 2024-06-05 RX ORDER — OXYTOCIN/0.9 % SODIUM CHLORIDE 30/500 ML
340 PLASTIC BAG, INJECTION (ML) INTRAVENOUS CONTINUOUS PRN
Status: DISCONTINUED | OUTPATIENT
Start: 2024-06-05 | End: 2024-06-06 | Stop reason: HOSPADM

## 2024-06-05 RX ORDER — MODIFIED LANOLIN
OINTMENT (GRAM) TOPICAL
Status: DISCONTINUED | OUTPATIENT
Start: 2024-06-05 | End: 2024-06-06 | Stop reason: HOSPADM

## 2024-06-05 RX ORDER — ACETAMINOPHEN 325 MG/1
650 TABLET ORAL EVERY 4 HOURS PRN
Status: DISCONTINUED | OUTPATIENT
Start: 2024-06-05 | End: 2024-06-06 | Stop reason: HOSPADM

## 2024-06-05 RX ORDER — NALOXONE HYDROCHLORIDE 0.4 MG/ML
0.4 INJECTION, SOLUTION INTRAMUSCULAR; INTRAVENOUS; SUBCUTANEOUS
Status: DISCONTINUED | OUTPATIENT
Start: 2024-06-05 | End: 2024-06-05

## 2024-06-05 RX ORDER — ACETAMINOPHEN 325 MG/1
650 TABLET ORAL EVERY 4 HOURS PRN
Status: DISCONTINUED | OUTPATIENT
Start: 2024-06-05 | End: 2024-06-05

## 2024-06-05 RX ORDER — OXYTOCIN/0.9 % SODIUM CHLORIDE 30/500 ML
340 PLASTIC BAG, INJECTION (ML) INTRAVENOUS CONTINUOUS PRN
Status: DISCONTINUED | OUTPATIENT
Start: 2024-06-05 | End: 2024-06-05

## 2024-06-05 RX ORDER — HYDROCORTISONE 25 MG/G
CREAM TOPICAL 3 TIMES DAILY PRN
Status: DISCONTINUED | OUTPATIENT
Start: 2024-06-05 | End: 2024-06-06 | Stop reason: HOSPADM

## 2024-06-05 RX ORDER — FENTANYL/ROPIVACAINE/NS/PF 2MCG/ML-.1
PLASTIC BAG, INJECTION (ML) EPIDURAL
Status: DISCONTINUED | OUTPATIENT
Start: 2024-06-05 | End: 2024-06-05

## 2024-06-05 RX ORDER — MISOPROSTOL 200 UG/1
400 TABLET ORAL
Status: DISCONTINUED | OUTPATIENT
Start: 2024-06-05 | End: 2024-06-06 | Stop reason: HOSPADM

## 2024-06-05 RX ORDER — PNV NO.95/FERROUS FUM/FOLIC AC 28MG-0.8MG
1 TABLET ORAL DAILY
Qty: 90 TABLET | Refills: 3 | Status: SHIPPED | OUTPATIENT
Start: 2024-06-05

## 2024-06-05 RX ORDER — METHYLERGONOVINE MALEATE 0.2 MG/ML
200 INJECTION INTRAVENOUS
Status: DISCONTINUED | OUTPATIENT
Start: 2024-06-05 | End: 2024-06-06 | Stop reason: HOSPADM

## 2024-06-05 RX ORDER — TRANEXAMIC ACID 10 MG/ML
1 INJECTION, SOLUTION INTRAVENOUS EVERY 30 MIN PRN
Status: DISCONTINUED | OUTPATIENT
Start: 2024-06-05 | End: 2024-06-06 | Stop reason: HOSPADM

## 2024-06-05 RX ORDER — CITRIC ACID/SODIUM CITRATE 334-500MG
30 SOLUTION, ORAL ORAL
Status: DISCONTINUED | OUTPATIENT
Start: 2024-06-05 | End: 2024-06-05

## 2024-06-05 RX ORDER — PHYTONADIONE 1 MG/.5ML
INJECTION, EMULSION INTRAMUSCULAR; INTRAVENOUS; SUBCUTANEOUS
Status: DISCONTINUED
Start: 2024-06-05 | End: 2024-06-05 | Stop reason: HOSPADM

## 2024-06-05 RX ORDER — MISOPROSTOL 200 UG/1
800 TABLET ORAL
Status: DISCONTINUED | OUTPATIENT
Start: 2024-06-05 | End: 2024-06-05

## 2024-06-05 RX ORDER — PROCHLORPERAZINE 25 MG
25 SUPPOSITORY, RECTAL RECTAL EVERY 12 HOURS PRN
Status: DISCONTINUED | OUTPATIENT
Start: 2024-06-05 | End: 2024-06-05

## 2024-06-05 RX ORDER — ACETAMINOPHEN 325 MG/1
650 TABLET ORAL EVERY 6 HOURS PRN
Qty: 100 TABLET | Refills: 0 | Status: SHIPPED | OUTPATIENT
Start: 2024-06-05

## 2024-06-05 RX ORDER — IBUPROFEN 800 MG/1
800 TABLET, FILM COATED ORAL
Status: DISCONTINUED | OUTPATIENT
Start: 2024-06-05 | End: 2024-06-05

## 2024-06-05 RX ORDER — FENTANYL CITRATE-0.9 % NACL/PF 10 MCG/ML
PLASTIC BAG, INJECTION (ML) INTRAVENOUS
Status: DISCONTINUED
Start: 2024-06-05 | End: 2024-06-05 | Stop reason: HOSPADM

## 2024-06-05 RX ORDER — LIDOCAINE 40 MG/G
CREAM TOPICAL
Status: DISCONTINUED | OUTPATIENT
Start: 2024-06-05 | End: 2024-06-05

## 2024-06-05 RX ADMIN — SODIUM CHLORIDE, POTASSIUM CHLORIDE, SODIUM LACTATE AND CALCIUM CHLORIDE 1000 ML: 600; 310; 30; 20 INJECTION, SOLUTION INTRAVENOUS at 10:34

## 2024-06-05 RX ADMIN — Medication 340 ML/HR: at 12:35

## 2024-06-05 RX ADMIN — BUPIVACAINE HYDROCHLORIDE 10 ML: 2.5 INJECTION, SOLUTION EPIDURAL; INFILTRATION; INTRACAUDAL at 11:18

## 2024-06-05 RX ADMIN — Medication: at 11:17

## 2024-06-05 RX ADMIN — LIDOCAINE HYDROCHLORIDE 20 ML: 10 INJECTION, SOLUTION EPIDURAL; INFILTRATION; INTRACAUDAL; PERINEURAL at 12:42

## 2024-06-05 RX ADMIN — KETOROLAC TROMETHAMINE 30 MG: 30 INJECTION, SOLUTION INTRAMUSCULAR; INTRAVENOUS at 13:33

## 2024-06-05 RX ADMIN — SODIUM CHLORIDE, POTASSIUM CHLORIDE, SODIUM LACTATE AND CALCIUM CHLORIDE: 600; 310; 30; 20 INJECTION, SOLUTION INTRAVENOUS at 12:10

## 2024-06-05 ASSESSMENT — ACTIVITIES OF DAILY LIVING (ADL)
ADLS_ACUITY_SCORE: 20
ADLS_ACUITY_SCORE: 35
ADLS_ACUITY_SCORE: 20

## 2024-06-05 NOTE — PLAN OF CARE
of viable Male with Dr. Ellison and Dr. [place] in attandance.   Suo present.  Infant with spontaneous cry, to mothers abdomen, dried and stimulated.  Apgars 8&8&9.  Placenta delivered without complication, Pitocin bolused, 2nd degree  laceration with repair, bola cares provided. Initial  mL. Mother and baby in stable condition.

## 2024-06-05 NOTE — PROGRESS NOTES
Data: Hakeem Meade transferred to [id number] via wheelchair at 1440. Baby transferred via parent's arms.  Action: Receiving unit notified of transfer: Yes. Patient and family notified of room change. Report given to SYDNIE Belcher at 1400. Belongings sent to receiving unit. Accompanied by Registered Nurse. Oriented patient to surroundings. Call light within reach. ID bands double-checked with receiving RN.  Response: Patient tolerated transfer and is stable.

## 2024-06-05 NOTE — ANESTHESIA PREPROCEDURE EVALUATION
Anesthesia Pre-Procedure Evaluation    Patient: Hakeem Meade   MRN: [id number] : 1990        Procedure :           Past Medical History:   Diagnosis Date    NO ACTIVE PROBLEMS     Varicella       Past Surgical History:   Procedure Laterality Date    C EACH ADD TOOTH EXTRACTION      DILATION AND EVACUATION N/A 2019    Procedure: DILATION AND EVACUATION;  Surgeon: Meghana Hoyos MD;  Location: [facility] OR    FOOT SURGERY      GUM SURGERY      oral surgery      Allergies   Allergen Reactions    Cephalosporins Other (See Comments)      Social History     Tobacco Use    Smoking status: Never    Smokeless tobacco: Never   Substance Use Topics    Alcohol use: No      Wt Readings from Last 1 Encounters:   24 82.1 kg (181 lb)        Anesthesia Evaluation   Pt has had prior anesthetic. Type: OB Labor Epidural.    No history of anesthetic complications       ROS/MED HX  ENT/Pulmonary:  - neg pulmonary ROS     Neurologic:  - neg neurologic ROS     Cardiovascular:  - neg cardiovascular ROS     METS/Exercise Tolerance:     Hematologic:  - neg hematologic  ROS     Musculoskeletal:       GI/Hepatic:  - neg GI/hepatic ROS     Renal/Genitourinary:       Endo:  - neg endo ROS     Psychiatric/Substance Use:  - neg psychiatric ROS     Infectious Disease:       Malignancy:       Other:     (-) previous        Physical Exam    Airway        Mallampati: II   TM distance: > 3 FB   Neck ROM: full   Mouth opening: > 3 cm    Respiratory Devices and Support         Dental  no notable dental history         Cardiovascular   cardiovascular exam normal          Pulmonary   pulmonary exam normal                OUTSIDE LABS:  CBC:   Lab Results   Component Value Date    WBC 10.3 03/15/2024    WBC 8.7 2023    HGB 13.1 2024    HGB 11.9 03/15/2024    HCT 35.6 03/15/2024    HCT 38.7 2023     2024     03/15/2024     BMP:   Lab Results   Component Value Date     2019  "   POTASSIUM 3.8 11/27/2019    CHLORIDE 104 11/27/2019    CO2 22 11/27/2019    BUN 15 11/27/2019    CR 0.46 (L) 11/27/2019     (H) 06/10/2023    GLC 93 06/11/2022     COAGS: No results found for: \"PTT\", \"INR\", \"FIBR\"  POC: No results found for: \"BGM\", \"HCG\", \"HCGS\"  HEPATIC: No results found for: \"ALBUMIN\", \"PROTTOTAL\", \"ALT\", \"AST\", \"GGT\", \"ALKPHOS\", \"BILITOTAL\", \"BILIDIRECT\", \"CONCHIS\"  OTHER:   Lab Results   Component Value Date    A1C 5.5 06/11/2022    YAMILET 9.0 11/27/2019    TSH 2.94 02/14/2020       Anesthesia Plan    ASA Status:  2       Anesthesia Type: Epidural.              Consents    Anesthesia Plan(s) and associated risks, benefits, and realistic alternatives discussed. Questions answered and patient/representative(s) expressed understanding.     - Discussed:     - Discussed with:  Patient, Spouse            Postoperative Care            Comments:           neg OB ROS.      Junaid Paula MD    I have reviewed the pertinent notes and labs in the chart from the past 30 days and (re)examined the patient.  Any updates or changes from those notes are reflected in this note.                  "

## 2024-06-05 NOTE — L&D DELIVERY NOTE
Delivery Summary    Hakeem Meade MRN# [id number]   Age: 33 year old YOB: 1990     Vaginal Delivery Summary:     Hakeem Meade is a 33 year old  who presented at 38w2d for spontaneous labor.  Pregnancy was notable for AMA.  She presented at 5cm dilated. She had SROM 2 hours prior to presentation. Epidural was administered for pain control. Labor progressed without augmentation. She progressed to complete cervical dilation, pushed with good maternal effort, and delivered a liveborn male infant weighing 3420g in OA position, APGARs 8/ 8.  She had a category II FHT immediately prior to delivery with pushing. Shoulders delivered without difficulty. No nuchal cord present.  The baby was placed on the patient's abdomen to initiate immediate skin to skin bonding. Delayed cord clamping occurred. The umbilical cord was clamped and cut, and routine cord blood was obtained and held if needed for gases. IV Pitocin was started for active management of the third stage. Placenta was delivered with gentle downward traction and suprapubic pressure. It was found to be intact with a three vessel cord. Fundus was firm with fundal massage.  Exam of the vagina and perineum revealed a second degree perineal laceration, repaired with 3-0 Vicryl and 1% lidocaine. Adequate hemostasis was achieved on final exam of the vagina and perineum.  mL. Instrument, sharp, and lap count was correct x2.     Dr. Ellison was present for the delivery and repair.    Charlene Colorado MD  Obstetrics & Gynecology, PGY-2  2024 2:54 PM      Tai Meade-Hakeem [[id number]]      Labor Event Times      Latent labor onset date/time: 2024 0700    Active labor onset date: 24 Onset time: 10:00 AM   Dilation complete date: 24 Complete time: 12:06 PM   Start pushing date/time: 2024 1216          Labor Length      1st Stage (hrs): 2 (min): 6   2nd Stage (hrs): 0 (min): 26   3rd Stage (hrs): 0 (min): 8          Labor Events      labor?: No   steroids: None  Labor Type: Spontaneous  Predominate monitoring during 1st stage: continuous electronic fetal monitoring     Antibiotics received during labor?: No     Rupture identifier: Sac 1  Rupture date/time: 24   Rupture type: Spontaneous Rupture of Membranes  Fluid color: Clear  Fluid odor: Normal     Augmentation: None       Delivery/Placenta Date and Time      Delivery Date: 24 Delivery Time: 12:32 PM   Placenta Date/Time: 2024 12:40 PM  Oxytocin given at the time of delivery: after delivery of baby  Delivering clinician: Halina Ellison MD   Other personnel present at delivery:  Provider Role   Charlene Colorado MD Resident   Héctor Flowers RN Delivery Nurse   Mirella Bliss RN Registered Nurse             Vaginal Counts       Initial count performed by 2 team members:  Two Team Members   Dr. Stanislav Flowers RN         Needles Suture Needles Sponges (RETIRED) Instruments   Initial counts 2  5    Added to count  1     Relief counts       Final counts 2 1 5            Placed during labor Accounted for at the end of labor   FSE No NA   IUPC No NA   Cervidil No NA                  Final count performed by 2 team members:  Two Team Members   Dr. Stanislav Flowers RN      Final count correct?: Yes  Post-Birth Team Debrief: Complete       Apgars    Living status: Living   1 Minute 5 Minute 10 Minute 15 Minute 20 Minute   Skin color: 0  0  1      Heart rate: 2  2  2      Reflex irritability: 2  2  2      Muscle tone: 2  2  2      Respiratory effort: 2  2  2      Total: 8  8  9      Apgars assigned by: ROBERTO BLISS RN       Cord      Vessels: 3 Vessels    Cord Complications: None               Cord Blood Disposition: Discard    Gases Sent?: No    Delayed cord clamping?: Yes    Cord Clamping Delay (seconds):  seconds    Stem cell collection?: No            Resuscitation    [place] Care at Delivery: Spont cry, to mom's chest, dried. Slow to pink  "up, HR and resp WNL, O2 sats 90s. 9 apgar by 10 min of life. Tight frenulum noted.        Measurements      Weight: 7 lb 8.6 oz Length: 1' 8\"     Head circumference: 35.6 cm           Skin to Skin and Feeding Plan      Skin to skin initiation date/time:       Skin to skin end date/time:     Breastfeeding initiated date/time: 2024 1306       Labor Events and Shoulder Dystocia    Fetal Tracing Prior to Delivery: Category 2  Shoulder dystocia present?: Neg       Delivery (Maternal) (Provider to Complete) (154792)    Episiotomy: None  Perineal lacerations: 2nd Repaired?: Yes   Repair suture: 3-0 Vicryl  Genital tract inspection done: Pos       Blood Loss  Mother: Hakeem Meade #[id number]     Start of Mother's Information      Delivery Blood Loss  24 1000 - 24 1458      Delivery QBL (mL) Hospital Encounter 300 mL    Total  300 mL               End of Mother's Information  Mother: Hakeem Meade #[id number]                Delivery - Provider to Complete (903025)    Delivering clinician: Halina Ellison MD  Delivery Type (Choose the 1 that will go to the Birth History): Vaginal, Spontaneous                         Other personnel:  Provider Role   Charlene Colorado MD Resident   Héctor Flowers, SYDNIE Delivery Nurse   Mirella Ureña RN Registered Nurse                    Placenta    Date/Time: 2024 12:40 PM  Removal: Expressed  Disposition: Hospital disposal             Anesthesia    Method: Epidural  Cervical dilation at placement: 4-7                    Presentation and Position    Presentation: Vertex     Occiput Anterior                     Charlene Colorado MD    Staff MD Note  I was present and scrubbed for the entire procedure noted above.  I agree with the description above and any necessary changes have been made by me.  Halina Ellison MD    "

## 2024-06-05 NOTE — PROGRESS NOTES
Data: Patient admitted to room 471 at 0955. Patient is a . Prenatal record reviewed.   OB History    Para Term  AB Living   3 1 1 0 1 1   SAB IAB Ectopic Multiple Live Births   0 0 0 0 1      # Outcome Date GA Lbr Maxime/2nd Weight Sex Type Anes PTL Lv   3 Current            2 Term 20 39w3d 10:58 / 01:13 3.28 kg (7 lb 3.7 oz) F Vag-Spont EPI N RAYO      Name: LAW PERRY      Apgar1: 9  Apgar5: 9   1 AB 18 15w0d             Complications: Chromosome anomaly   .  Medical History:   Past Medical History:   Diagnosis Date    NO ACTIVE PROBLEMS     Varicella    .  Gestational age 38w2d. Vital signs per doc flowsheet. Fetal movement present. Patient reports Laboring   as reason for admission. Support persons Suo present.  Action: Verbal consent for EFM, external fetal monitors applied. Admission assessment completed. Patient and support persons educated on labor process. Patient instructed to report change in fetal movement, contractions, vaginal leaking of fluid or bleeding, abdominal pain, or any concerns related to the pregnancy to her nurse/physician. Patient oriented to room, call light in reach.   Response: Dr. Colorado informed of arrival. SVE /-1. Plan per provider is epidural per pt preference and expectant management of labor. Patient verbalized understanding of education and verbalized agreement with plan. Patient coping with labor via support person and RN. Consented for epidural.

## 2024-06-05 NOTE — PLAN OF CARE
Problem: Postpartum (Vaginal Delivery)  Goal: Hemostasis  Outcome: Progressing  Goal: Absence of Infection Signs and Symptoms  Outcome: Progressing  Goal: Optimal Pain Control and Function  Outcome: Progressing   Goal Outcome Evaluation:  Patient's left labial hematoma monitored, ice on, appearance has been stable. See flowsheet for specifics. All other VSS and postpartum assessments WDL.  Up ad lori with steady gait and independent with cares.  Bonding well with infant.  Breastfeeding on cue.  Pain managed with ice, Tucks and toradol, declines tylenol at this time, she is aware she can ask for it at any time.  Spouse present and supportive.  Will continue with postpartum cares and education per plan of care.

## 2024-06-05 NOTE — H&P
[facility]  OB History and Physical   2024  Laura Meade  [id number]    HPI: Laura Meade is a 33 year old  at 38w2d by LMP c.w 10w2d US who presents for labor.    She is feeling contractions every 3 minutes. Contractions started around 0700 this morning and have become more intense and frequent since 0800.    Unsure if her bag of water has ruptured. Does feel like she cannot control her bladder today, has been leaking on her legs.    She denies vaginal bleeding and is feeling normal fetal movement. She denies headache, vision changes, chest pain, shortness of breath, fever, chills, nausea, vomiting or other systemic complaints. No dysuria or changes in vaginal discharge.      Pregnancy notable for:  AMA  History of D&E (prior to previous )    OB History   OB History    Para Term  AB Living   3 1 1 0 1 1   SAB IAB Ectopic Multiple Live Births   0 0 0 0 1      # Outcome Date GA Lbr Maxime/2nd Weight Sex Type Anes PTL Lv   3 Current            2 Term 20 39w3d 10:58 / 01:13 3.28 kg (7 lb 3.7 oz) F Vag-Spont EPI N RAYO      Name: VICKY,FEMALE-LAURA      Apgar1: 9  Apgar5: 9   1 AB 18 15w0d             Complications: Chromosome anomaly     Past Medical History  Past Medical History:   Diagnosis Date    NO ACTIVE PROBLEMS     Varicella      Past Surgical History  Past Surgical History:   Procedure Laterality Date    C EACH ADD TOOTH EXTRACTION      DILATION AND EVACUATION N/A 2019    Procedure: DILATION AND EVACUATION;  Surgeon: Meghana Hoyos MD;  Location: [facility] OR    FOOT SURGERY      GUM SURGERY      oral surgery     Medications   Prior to Admission medications    Medication Sig Last Dose Taking? Auth Provider Long Term End Date   Prenatal Vit-Fe Fumarate-FA (PRENATAL VITAMIN) 27-0.8 MG TABS Take 1 tablet by mouth daily   Reported, Patient       Allergies  Allergies   Allergen Reactions    Cephalosporins Other (See  Comments)     Family History  Family History   Problem Relation Age of Onset    Cerebrovascular Disease Mother     Hyperlipidemia Mother     LUNG DISEASE Mother     Hypertension Mother     No Known Problems Father     Pancreatitis Maternal Grandmother         some type of pancreas problem, not cancer    Cerebrovascular Disease Maternal Grandfather     Diabetes Paternal Grandmother     Breast Cancer No family hx of     Colon Cancer No family hx of      Social History   Social History     Tobacco Use    Smoking status: Never    Smokeless tobacco: Never   Vaping Use    Vaping status: Never Used   Substance Use Topics    Alcohol use: No    Drug use: No     Physical Exam  Vitals:    24 1011   BP: 126/71   Resp: 18   Temp: 98.1  F (36.7  C)   TempSrc: Oral     General: alert, oriented female, resting in bed in NAD  CV: regular rate and rhythm  Lungs: normal rate and work of breathing on room air  Abdomen: soft, gravid, non-tender to palpation    Cervix: 5 / 90% / -1  Membranes: s/p SROM clear  Presentation: cephalic by bedside US    FHT: baseline 145 bpm, moderate variability, accelerations present, decelerations absent  [place]ol[place]ue: 3 contractions per 10 minutes    Prenatal Labs & Imaging:  Rh positive, antibody negative  Rubella immune, Varicella immune  Hepatitis B sAg NR, Hepatitis C NR, HIV NR, RPR negative  ,  GC/Chlam negative, GBS neg  Genetic screening: NIPT low risk  Ultrasound (): normal anatomy, anterior placenta    A/P: 33 year old  at 38w2d who presents for spontaneous labor and SROM. Pregnancy otherwise notable for AMA and history of D&E prior to first . Will admit to Labor & Delivery.    # Spontaneous Labor  - Management: expectant management  - Labs: CBC, T&S, RPR  - GBS negative: no antibiotics indicated  - Pain control: per patient preference, options discussed. Desires epidural in active labor.  - PPH meds: no contraindications to standard meds  - DVT PPx: SCDs for prolonged periods  of immobility  - Diet: regular    # Prenatal Care  - Rh positive, Rubella immune, GBS negative    # Fetal Well Being  Category I FHT, reactive.  - Continuous fetal monitoring  - Intrauterine resuscitative measures PRN    Patient staffed with Dr. Ellison.    Charlene Colorado MD  Obstetrics & Gynecology, PGY-2  6/5/2024 10:29 AM     Staff MD Note    I appreciate the note by Dr. Colorado.  Any necessary changes have been made by me.  I saw and evaluated the patient and agree with the findings and plan of care as documented in the note.    Halina Ellison MD

## 2024-06-05 NOTE — ANESTHESIA PROCEDURE NOTES
Epidural catheter Procedure Note    Pre-Procedure   Staff -        Anesthesiologist:  Daniel Gonsales MD       Resident/Fellow: Junaid Paula MD       Other Anesthesia Staff: Kwan De Souza MD       Performed By: resident and with residents       Procedure performed by resident/fellow/CRNA in presence of a teaching physician.         Location: OB       Procedure Start/Stop Times: 6/5/2024 10:58 AM       Pre-Anesthestic Checklist: patient identified, IV checked, risks and benefits discussed, informed consent, monitors and equipment checked, pre-op evaluation, at physician/surgeon's request and post-op pain management  Timeout:       Correct Patient: Yes        Correct Procedure: Yes        Correct Site: Yes        Correct Position: Yes   Procedure Documentation  Procedure: epidural catheter       Diagnosis: labor analgesia       Patient Position: sitting       Patient Prep/Sterile Barriers: sterile gloves, mask, patient draped       Skin prep: Chloraprep       Local skin infiltrated with mL of 2% lidocaine.        Insertion Site: L3-4. (midline approach).       Technique: LORT saline        ALYSSA at 7 cm.       Needle Type: Touhy needle       Needle Gauge: 17.        Needle Length (Inches): 3.5        Catheter: 19 G.          Catheter threaded easily.         5 cm epidural space.         Threaded 12 cm at skin.         # of attempts: 2 and  # of redirects:  2    Assessment/Narrative         Paresthesias: No.       Test dose of 3 mL lidocaine 1.5% w/ 1:200,000 epinephrine at 11:14 CDT.         Test dose negative, 3 minutes after injection, for signs of intravascular, subdural, or intrathecal injection.       Insertion/Infusion Method: LORT saline       Aspiration negative for Heme or CSF via Epidural Catheter.       Sensory Level Left: T10.       Sensory Level Right: T10.    Medication(s) Administered   0.125% bupivacaine 5 mL + fentanyl 20 mcg + NS 5 mL (Epidural) (Mixture components: BUPivacaine HCl (PF) 0.25 %  "Soln, 5 mL; fentaNYL (PF) 100 MCG/2ML Soln, 20 mcg; sodium chloride 0.9 % Soln, 5 mL) - EPIDURAL   10 mL - 6/5/2024 11:18:00 AM  Medication Administration Time: 6/5/2024 10:58 AM     Comments:  Routine labor epidural placement without complications.      FOR [facility] ([facility] [place]) ONLY:   Pain Team Contact information: please page the Pain Team Via [employer]. Search \"Pain\". During daytime hours, please page the attending first. At night please page the resident first.      "

## 2024-06-05 NOTE — DISCHARGE SUMMARY
[facility]  Discharge Summary    Hakeem Meade MRN# [id number]   Age: 33 year old YOB: 1990     Date of Admission:  2024  Date of Discharge::  2024  Admitting Physician:  Halina Ellison MD  Discharge Physician:  Raisa Adam MD        Admission Diagnosis:   Intrauterine pregnancy at 38w2d  Spontaneous labor  SROM        Discharge Diagnosis:   Same, delivered        Procedures:   Spontaneous Vaginal Delivery  Epidural anesthesia       Medications Prior to Admission:     Medications Prior to Admission   Medication Sig Dispense Refill Last Dose    [DISCONTINUED] Prenatal Vit-Fe Fumarate-FA (PRENATAL VITAMIN) 27-0.8 MG TABS Take 1 tablet by mouth daily           Discharge Medications:        Review of your medicines        START taking        Dose / Directions   acetaminophen 325 MG tablet  Commonly known as: TYLENOL  Used for:  (spontaneous vaginal delivery)      Dose: 650 mg  Take 2 tablets (650 mg) by mouth every 6 hours as needed for mild pain Start after Delivery.  Quantity: 100 tablet  Refills: 0     ibuprofen 600 MG tablet  Commonly known as: ADVIL/MOTRIN  Used for:  (spontaneous vaginal delivery)      Dose: 600 mg  Take 1 tablet (600 mg) by mouth every 6 hours as needed for moderate pain Start after delivery  Quantity: 60 tablet  Refills: 0     senna-docusate 8.6-50 MG tablet  Commonly known as: SENOKOT-S/PERICOLACE  Used for:  (spontaneous vaginal delivery)      Dose: 1 tablet  Take 1 tablet by mouth daily Start after delivery.  Quantity: 100 tablet  Refills: 0            CONTINUE these medicines which may have CHANGED, or have new prescriptions. If we are uncertain of the size of tablets/capsules you have at home, strength may be listed as something that might have changed.        Dose / Directions   Prenatal Vitamin 27-0.8 MG Tabs  This may have changed: additional instructions  Used for:  (spontaneous vaginal delivery), Care  and examination of lactating mother      Dose: 1 tablet  Take 1 tablet by mouth daily Continue while breastfeeding.  Quantity: 90 tablet  Refills: 3               Where to get your medicines        These medications were sent to [facility] [place], [place] - [address]  [address] [facility], [facility] [address]      Phone: [phone]   acetaminophen 325 MG tablet  ibuprofen 600 MG tablet  Prenatal Vitamin 27-0.8 MG Tabs  senna-docusate 8.6-50 MG tablet           Consultations:   Anesthesia  Lactation        Admission History & Intrapartum Course:   Hakeem Meade is a 33 year old  who presented at 38w2d for spontaneous labor.  Pregnancy was notable for AMA.  She presented at 5cm dilated. She had SROM 2 hours prior to presentation. Epidural was administered for pain control. Labor progressed without augmentation. She progressed to complete cervical dilation, pushed with good maternal effort, and delivered a liveborn male infant weighing 3420g in OA position, APGARs 8/ 8.  She had a category II FHT immediately prior to delivery with pushing. Shoulders delivered without difficulty. No nuchal cord present.  The baby was placed on the patient's abdomen to initiate immediate skin to skin bonding. Delayed cord clamping occurred. The umbilical cord was clamped and cut, and routine cord blood was obtained and held if needed for gases. IV Pitocin was started for active management of the third stage. Placenta was delivered with gentle downward traction and suprapubic pressure. It was found to be intact with a three vessel cord. Fundus was firm with fundal massage.  Exam of the vagina and perineum revealed a second degree perineal laceration, repaired with 3-0 Vicryl and 1% lidocaine. Adequate hemostasis was achieved on final exam of the vagina and perineum.  mL. Instrument, sharp, and lap count was correct x2.     See admission H&P and delivery note for full details.       Postpartum Course:    The patient's postpartum course was unremarkable. She recovered as anticipated and experienced no complications. On discharge, her pain was well controlled. Vaginal bleeding was less than or similar to peak menstrual flow. She was spontaneously voiding without difficulty, ambulating well without dizziness, tolerating a normal diet without nausea or vomiting, and passing flatus. She was breastfeeding. She desired nothing for birth control. Rhogam was not indicated. She was discharged on postpartum day #1.    Postpartum hemoglobin:   Hemoglobin   Date Value Ref Range Status   06/06/2024 10.9 (L) 11.7 - 15.7 g/dL Final   05/11/2020 11.4 (L) 11.7 - 15.7 g/dL Final        Discharge Instructions and Follow-Up:     Discharge diet: Regular   Discharge activity: Pelvic rest for 6 weeks including no sexual intercourse, tampons, or douching.   Call for temperature greater than 100.4 F, heavy vaginal bleeding, worsening abdominal pain, inability to void, nausea or vomiting, or foul smelling vaginal discharge.   Discharge follow-up: 6 week routine postpartum visit         Discharge Disposition:   Discharged to home in stable condition.    Charlene Colorado MD  Obstetrics & Gynecology, PGY-2  6/6/2024 3:54 PM     The patient was seen and examined by me on the day of discharge.  I have reviewed and agree with the resident's above note.    Raisa Adam MD, FACOG

## 2024-06-06 VITALS
RESPIRATION RATE: 16 BRPM | WEIGHT: 169 LBS | OXYGEN SATURATION: 97 % | HEIGHT: 64 IN | SYSTOLIC BLOOD PRESSURE: 118 MMHG | BODY MASS INDEX: 28.85 KG/M2 | TEMPERATURE: 97.8 F | HEART RATE: 65 BPM | DIASTOLIC BLOOD PRESSURE: 68 MMHG

## 2024-06-06 LAB — HGB BLD-MCNC: 10.9 G/DL (ref 11.7–15.7)

## 2024-06-06 PROCEDURE — 85018 HEMOGLOBIN: CPT

## 2024-06-06 PROCEDURE — 250N000013 HC RX MED GY IP 250 OP 250 PS 637

## 2024-06-06 PROCEDURE — 36415 COLL VENOUS BLD VENIPUNCTURE: CPT

## 2024-06-06 RX ADMIN — DOCUSATE SODIUM 100 MG: 100 CAPSULE, LIQUID FILLED ORAL at 08:46

## 2024-06-06 ASSESSMENT — ACTIVITIES OF DAILY LIVING (ADL)
ADLS_ACUITY_SCORE: 20

## 2024-06-06 NOTE — PLAN OF CARE
Data: VSS, postpartum assessments WNL. She is voiding without difficulty, up ad lori, eating and drinking without nausea. Labial hematoma not extending, lochia WNL, no s/s infection. Breastfeeding infant  with min assistance. Taking tylenol and ibuprofen for pain and pt reports adequate pain management.   Action: Education provided on discharge goals, plan of care, pain management.  Response: Pt is agreeable with her plan of care. Pt is independent providing  cares and is attentive to infant needs. Support person,  spouse, present. Plan of care ongoing, no concerns as of present.

## 2024-06-06 NOTE — PLAN OF CARE
Problem: Adult Inpatient Plan of Care  Goal: Readiness for Transition of Care  Outcome: Adequate for Care Transition     Assessment complete and WDL. VSS. Pt up ad lori, voiding WDL. Pt denies pain and declines pain medication. Pt breastfeeding well, lactation consulted.    Discharge instructions covered with pt. Discharge medications given and education provided. Discharge complete.

## 2024-06-06 NOTE — DISCHARGE INSTRUCTIONS
"After Your Delivery (the Postpartum Period): Care Instructions  Overview     After childbirth (postpartum period), your body goes through many changes as you recover. In these weeks after delivery, try to take good care of yourself. Get rest whenever you can and accept help from others.  It may take 4 to 6 weeks to feel like yourself again, and possibly longer if you had a  birth. You may feel sore or very tired as you recover. After delivery, you may continue to have contractions as the uterus returns to the size it was before your pregnancy. You will also have some vaginal bleeding. And you may have pain around the vagina as you heal. Several days after delivery you may also have pain and swelling in your breasts as they fill with milk. There are things you can do at home to help ease these discomforts.  After childbirth, it's common to feel emotional. You may feel irritable, cry easily, and feel happy one minute and sad the next. This is called the \"baby blues.\" Hormone changes are one cause of these emotional changes. These feelings usually get better within a couple of weeks. If they don't, talk to your doctor or midwife.  In the first couple of weeks after you give birth, your doctor or midwife may want to check in with you and make a plan for follow-up care. You will likely have a complete postpartum visit in the first 3 months after delivery. At that time, your doctor or midwife will check on your recovery and see how you're doing. But if you have questions or concerns before then, you can always call your doctor or midwife.  Follow-up care is a key part of your treatment and safety. Be sure to make and go to all appointments, and call your doctor if you are having problems. It's also a good idea to know your test results and keep a list of the medicines you take.  How can you care for yourself at home?  Taking care of your body  Use pads instead of tampons for bleeding. After birth, you will have " bloody vaginal discharge. You may also pass some blood clots that shouldn't be bigger than an egg. Over the next 6 weeks or so, your bleeding should decrease a little every day and slowly change to a pinkish and then whitish discharge.  For cramps or mild pain, try an over-the-counter pain medicine, such as acetaminophen (Tylenol) or ibuprofen (Advil, Motrin). Read and follow all instructions on the label.  To ease pain around the vagina or from hemorrhoids:  Put ice or a cold pack on the area for 10 to 20 minutes at a time. Put a thin cloth between the ice and your skin.  Try sitting in a few inches of warm water (sitz bath) when you can or after bowel movements.  Clean yourself with a gentle squeeze of warm water from a bottle instead of wiping with toilet paper.  Use witch hazel or hemorrhoid pads (such as Tucks).  Try using a cold compress for sore and swollen breasts. And wear a supportive bra that fits.  Ease constipation by drinking plenty of fluids and eating high-fiber foods. Ask your doctor or midwife about over-the-counter stool softeners.  Activity  Rest when you can.  Ask for help from family or friends when you need it.  If you can, have another adult in your home for at least 2 or 3 days after birth.  When you feel ready, try to get some exercise every day. For many people, walking is a good choice. Don't do any heavy exercise until your doctor or midwife says it's okay.  Ask your doctor or midwife when it is okay to have vaginal sex.  If you don't want to get pregnant, talk to your doctor or midwife about birth control options. You can get pregnant even before your period returns. Also, you can get pregnant while you are breastfeeding.  Talk to your doctor or midwife if you want to get pregnant again. They can talk to you about when it is safe.  Emotional health  It's normal to have some sadness, anxiety, and mood swings after delivery. It may help to talk with a trusted friend or family member. You  can also call the Maternal Mental Health Hotline at 6-565-NBQ-MAMA (1-142.193.9120) for support. If these mood changes last more than a couple of weeks, talk to your doctor or midwife.  When should you call for help?  Share this information with your partner, family, or a friend. They can help you watch for warning signs.  Call 911  anytime you think you may need emergency care. For example, call if:    You feel you cannot stop from hurting yourself, your baby, or someone else.     You passed out (lost consciousness).     You have chest pain, are short of breath, or cough up blood.     You have a seizure.   Where to get help 24 hours a day, 7 days a week   If you or someone you know talks about suicide, self-harm, a mental health crisis, a substance use crisis, or any other kind of emotional distress, get help right away. You can:    Call the Suicide and Crisis Lifeline at 988.     Call 1-918-016-TALK (1-514.670.8712).     Text HOME to 814802 to access the Crisis Text Line.   Consider saving these numbers in your phone.  Go to [employer] for more information or to chat online.  Call your doctor or midwife now or seek immediate medical care if:    You have signs of hemorrhage (too much bleeding), such as:  Heavy vaginal bleeding. This means that you are soaking through one or more pads in an hour. Or you pass blood clots bigger than an egg.  Feeling dizzy or lightheaded, or you feel like you may faint.  Feeling so tired or weak that you cannot do your usual activities.  A fast or irregular heartbeat.  New or worse belly pain.     You have signs of infection, such as:  A fever.  Increased pain, swelling, warmth, or redness from an incision or wound.  Frequent or painful urination or blood in your urine.  Vaginal discharge that smells bad.  New or worse belly pain.     You have symptoms of a blood clot in your leg (called a deep vein thrombosis), such as:  Pain in the calf, back of the knee, thigh, or  "groin.  Swelling in the leg or groin.  A color change on the leg or groin. The skin may be reddish or purplish, depending on your usual skin color.     You have signs of preeclampsia, such as:  Sudden swelling of your face, hands, or feet.  New vision problems (such as dimness, blurring, or seeing spots).  A severe headache.     You have signs of heart failure, such as:  New or increased shortness of breath.  New or worse swelling in your legs, ankles, or feet.  Sudden weight gain, such as more than 2 to 3 pounds in a day or 5 pounds in a week.  Feeling so tired or weak that you cannot do your usual activities.     You had spinal or epidural pain relief and have:  New or worse back pain.  Increased pain, swelling, warmth, or redness at the injection site.  Tingling, weakness, or numbness in your legs or groin.   Watch closely for changes in your health, and be sure to contact your doctor or midwife if:    Your vaginal bleeding isn't decreasing.     You feel sad, anxious, or hopeless for more than a few days.     You are having problems with your breasts or breastfeeding.   Where can you learn more?  Go to https://www.[employer].net/patiented  Enter A461 in the search box to learn more about \"After Your Delivery (the Postpartum Period): Care Instructions.\"  Current as of: July 10, 2023               Content Version: 14.0    3097-6404 [employer].   Care instructions adapted under license by your healthcare professional. If you have questions about a medical condition or this instruction, always ask your healthcare professional. [employer] disclaims any warranty or liability for your use of this information.      Warning Signs after Having a Baby    Keep this paper on your fridge or somewhere else where you can see it.    Call your provider if you have any of these symptoms up to 12 weeks after having your baby.    Thoughts of hurting yourself or your baby  Pain in your chest or trouble " breathing  Severe headache not helped by pain medicine  Eyesight concerns (blurry vision, seeing spots or flashes of light, other changes to eyesight)  Fainting, shaking or other signs of a seizure    Call 9-1-1 if you feel that it is an emergency.     The symptoms below can happen to anyone after giving birth. They can be very serious. Call your provider if you have any of these warning signs.    My provider s phone number: _______________________    Losing too much blood (hemorrhage)    Call your provider if you soak through a pad in less than an hour or pass blood clots bigger than a golf ball. These may be signs that you are bleeding too much.    Blood clots in the legs or lungs    After you give birth, your body naturally clots its blood to help prevent blood loss. Sometimes this increased clotting can happen in other areas of the body, like the legs or lungs. This can block your blood flow and be very dangerous.     Call your provider if you:  Have a red, swollen spot on the back of your leg that is warm or painful when you touch it.   Are coughing up blood.     Infection    Call your provider if you have any of these symptoms:  Fever of 100.4 F (38 C) or higher.  Pain or redness around your stitches if you had an incision.   Any yellow, white, or green fluid coming from places where you had stitches or surgery.    Mood Problems (postpartum depression)    Many people feel sad or have mood changes after having a baby. But for some people, these mood swings are worse.     Call your provider right away if you feel so anxious or nervous that you can't care for yourself or your baby.    Preeclampsia (high blood pressure)    Even if you didn't have high blood pressure when you were pregnant, you are at risk for the high blood pressure disease called preeclampsia. This risk can last up to 12 weeks after giving birth.     Call your provider if you have:   Pain on your right side under your rib cage  Sudden swelling in  the hands and face    Remember: You know your body. If something doesn't feel right, get medical help.     For informational purposes only. Not to replace the advice of your health care provider. Copyright 2020 [facility]. All rights reserved. Clinically reviewed by Ambar Garces, RNC-OB, MSN. [employer] [id number] - Rev 02/23.    Breastfeeding: Care Instructions  Breastfeeding (sometimes called chestfeeding) is a skill that gets easier with practice. Try to be patient with yourself and your baby. You're both learning how to breastfeed.    Breastfeeding has many benefits. It can help you bond with your baby. It may lower your baby's chances of getting an infection.   If you have trouble breastfeeding, talk to your doctor, midwife, or lactation consultant. Support can also come from a trusted friend or family member who knows how to breastfeed.     Eat a variety of foods.    Choose vegetables, fruits, milk products, whole grains, and proteins.    Try to limit or avoid certain things.    Limit alcohol. It can pass through breast milk to your baby.  Avoid smoking, vaping, marijuana, and other drugs.  Try to reduce caffeine if your baby is fussy and has problems with sleep.  Avoid fish high in mercury. These include shark, swordfish, omar mackerel, marlin, orange roughy, and bigeye tuna, as well as tilefish from the [place].    Talk to your doctor about medicines and supplements.    Some can affect your breast milk or your baby.    Try different breastfeeding positions.    Find what works for you and your baby.  Different holds include cradle, cross-cradle, football, Australian, laid back, and side-lying.    Take care of yourself.    Take steps to prevent painful or cracked nipples.  Make sure your baby feeds with a good latch.  Ask for help if you're having pain while breastfeeding.  Try letting some breast milk dry on your nipples.  Rest when you can, and drink plenty of water. And ask for help if  "you need it.  When should you call for help?   Call your doctor now or seek immediate medical care if:    You have symptoms of a breast infection, such as:  Increased pain, swelling, redness, or warmth around a breast.  Red streaks extending from the breast.  Pus draining from a breast.  A fever.     Your baby has no wet diapers for 6 hours.   Watch closely for changes in your health, and be sure to contact your doctor if:    Your baby has trouble latching on to your breast.     You continue to have pain or discomfort when breastfeeding.     You have other questions or concerns.   Where can you learn more?  Go to https://www.[employer].net/patiented  Enter P492 in the search box to learn more about \"Breastfeeding: Care Instructions.\"  Current as of: July 10, 2023               Content Version: 14.0    1126-9285 [employer].   Care instructions adapted under license by your healthcare professional. If you have questions about a medical condition or this instruction, always ask your healthcare professional. [employer], [employer] disclaims any warranty or liability for your use of this information.      "

## 2024-06-06 NOTE — PROGRESS NOTES
"OB Postpartum Progress Note  PPD#1 s/p     S: Feeling well this morning. Pain well controlled. Lochia improving. Tolerating regular diet without nausea or emesis. Notes some constipation. Ambulating without dizziness or lightheadedness. Voiding spontaneously without issues. Breast feeding.     O:  Patient Vitals for the past 24 hrs:   BP Temp Temp src Pulse Resp SpO2 Height Weight   24 0438 98/66 97.9  F (36.6  C) Oral 59 18 -- -- --   24 0019 100/68 97.8  F (36.6  C) Oral 67 18 -- -- --   24 2044 102/65 98.2  F (36.8  C) Oral 85 18 -- -- --   24 1825 122/62 97.8  F (36.6  C) Oral 79 18 -- -- --   24 1428 117/58 -- -- -- 17 97 % -- --   24 1413 111/55 -- -- -- 17 98 % -- --   24 1358 130/66 -- -- -- 17 98 % -- --   24 1343 132/64 -- -- -- 18 98 % -- --   24 1328 (!) 143/73 -- -- -- 17 98 % -- --   24 1313 120/70 -- -- -- 18 98 % -- --   24 1258 115/66 97.8  F (36.6  C) Oral -- 16 99 % -- --   24 1243 116/66 -- -- -- 20 98 % -- --   24 1206 105/64 97.5  F (36.4  C) Oral -- 18 98 % -- --   24 1202 111/69 -- -- -- 18 100 % -- --   24 1147 113/69 -- -- -- 20 98 % -- --   24 1143 109/64 -- -- -- 18 98 % -- --   24 1138 113/67 -- -- -- 16 97 % -- --   24 1132 113/75 -- -- -- 16 98 % -- --   24 1131 -- -- -- -- -- -- 1.626 m (5' 4\") 81.6 kg (180 lb)   24 1126 116/74 -- -- -- 16 97 % -- --   24 1124 111/65 -- -- -- 18 97 % -- --   24 112 113/63 -- -- -- 18 97 % -- --   24 1121 (!) 159/97 -- -- -- 22 98 % -- --   24 1118 (!) 151/79 -- -- -- 22 98 % -- --   24 1116 120/75 -- -- -- 20 98 % -- --   24 1011 126/71 98.1  F (36.7  C) Oral -- 18 -- -- --     Gen: in NAD  CV: Regular rate, well perfused  Resp: Non-labored breathing on room air  Abd: Soft, non-tender, fundus firm below the umbilicus and non-tender  Ext: No appreciable lower extremity edema bilaterally    Labs: " "  Recent Results (from the past 24 hour(s))   Hemoglobin    Collection Time: 24 10:32 AM   Result Value Ref Range    Hemoglobin 13.1 11.7 - 15.7 g/dL   Platelet count    Collection Time: 24 10:32 AM   Result Value Ref Range    Platelet Count 151 150 - 450 10e3/uL   Treponema Abs w Reflex to RPR and Titer    Collection Time: 24 10:32 AM   Result Value Ref Range    Treponema Antibody Total Nonreactive Nonreactive   Adult Type and Screen    Collection Time: 24 10:32 AM   Result Value Ref Range    ABO/RH(D) B POS     Antibody Screen Negative Negative    SPECIMEN EXPIRATION DATE [id number]    Hemoglobin    Collection Time: 24  6:00 AM   Result Value Ref Range    Hemoglobin 10.9 (L) 11.7 - 15.7 g/dL         A/P: Hakeem Meade is a 33 year old  who is PPD#1 s/p  following presentation in spontaneous labor. Stable in the postpartum period.    # Postpartum Care  - Pain: Continue PRN acetaminophen and ibuprofen  - Heme: Appropriate blood loss from delivery. No s/s of anemia. Hgb 13.1> > 10.9  - GI: PRN bowel regimen, anti-emetics  - : Voiding spontaneously   - PNC: Rh positive, Rubella immune. No interventions indicated.   - Breast feeding; no issues  - Contraception: Not yet discussed  - PPx: Encourage ambulation, IS, SCDs while confined to bed    # MR BP <4 hours apart  - continue to monitor    Medically Ready for Discharge: Anticipated Tomorrow    Francesca López MD, PGY-3  6:51 AM  [facility]    BP 98/66 (BP Location: Left arm)   Pulse 59   Temp 97.9  F (36.6  C) (Oral)   Resp 18   Ht 1.626 m (5' 4\")   Wt 81.6 kg (180 lb)   LMP 2023 (Exact Date)   SpO2 97%   Breastfeeding Unknown   BMI 30.90 kg/m      Hemoglobin   Date Value Ref Range Status   2024 10.9 (L) 11.7 - 15.7 g/dL Final         The patient was seen and examined by me separately from the team.  I have reviewed and agree with the above note.  She is doing well today " with no concerns except constipation-is afraid to strain against her perineal repair.  She would like to try Miralax today.  Mostly has questions about the baby for the pediatrician.  She would consider a later discharge today if baby is able to go home.  Plan routine postpartum follow up in 2 and 6 weeks.     Raisa Adam MD, FACOG

## 2024-06-06 NOTE — LACTATION NOTE
This note was copied from a baby's chart.  Consult for: Early Term Infant    Infant Name: Yusef    Infant's Primary Care Clinic: [facility]    Delivery Information:  Yusef was born at Gestational Age: 38w2d via vaginal delivery on 6/5/2024 12:32 PM     Maternal Health History:  Maternal past medical history, problem list and prior to admission medications reviewed and unremarkable.    Maternal Breast Exam:  Hakeem noted breast growth and sensitivity in early pregnancy. She denies any history of breast/chest injury or surgery. Her breasts are soft and symmetrical with bilateral intact nipples. She has been able to hand express colostrum. ?    Breastfeeding/ Lactation History: Hakeem  her first child for 8 months and then discontinued because her baby was iron deficient. We discussed that it is normal for iron stores to deplete around 6 months and then iron rich foods should be offered in the diet, and multivitamins are available if recommended by pediatrician, but that breastfeeding can continue.    Oral exam of baby:  Yusef has normal jaw, normal arched palate, minimal length of tongue beyond lingual frenulum attachment though good extension well beyond gum ridge & organized when sucking on finger.     Infant information: Yusef has age appropriate output. 21 hours old at time of consult.    Weight Change Since Birth: N/A, >24 hours.    Feeding History: Hakeem shares that breastfeeding has been going well so far. She would like some help with remembering how to position and get a deep latch with flanged lips.    Feeding Assessment: Hakeem brings Yusef to breast in football hold. He eagerly latches with wide gape. Hakeem reports mild discomfort so showed how to adjust latch by pushing more breast tissue into his mouth and ensuring lips are flanged outward. This helped to improve the comfort of the latch. Yusef continued to feed with coordinated, nutritive suck pattern with  "frequent audible swallows.     Education:   [x] Potential feeding challenges of early term infants  [x] Expected  feeding patterns in the first few days (pg. 38 of Your Guide to To Postpartum and  Care)/ the Second Night  [x] Stages of milk production  [x] Benefits of hand expression of colostrum  [x] Early feeding cues   [x] Feeding frequency- encourage at least every 3 hours.     [x] Benefits of feeding on cue  [x] Benefits of skin to skin  [x] Breastfeeding positions  [x] Tips to get and maintain a deep latch  [x] Nutritive vs.non-nutritive sucking  [x] Gentle breast compressions as needed to enhance milk transfer  [x] How to tell when baby is finished  [x] How to tell if baby is getting enough  [x] Expected  output  [x] [place] weight loss  [x] Infant Feeding Log  [x] [employer] Breastfeeding/Pumping videos  [x] Signs breastfeeding is going well (comfortable latch, audible swallows, age appropriate output and weight loss)    [x] Tips to prevent engorgement  [x] Signs of engorgement  [x] Tips to manage engorgement  [x] Hand expression and pumping recommendations  [] Supplement recommendations   [x] Satiety cues   [x] CDC breast pump part/infant feeding supplies cleaning recommendations  [x] Inpatient breastfeeding support  [x] Outpatient lactation resources and follow up recommendations    Handouts: Infant Feeding Log (Week 1, Your Guide to Postpartum & [place] Care Book) and [facility] Lactation Resources    Home Breast Pump: Spectra S2 dispensed and shown how to set up and use.     Plan (Early Term): Frequent skin to skin, watch for early feeding cues and breastfeed on cue with goal of 8 to 12 feedings in 24 hours. Go no longer than 3 hours between feedings. Encourage breast compressions to enhance milk transfer when infant at the breast.    Encourage hand expression after feedings until milk is in, and hands on pumping 4-6 times daily to support \"full term\" supply. Supplement " after breastfeeding with any expressed milk.     Follow up with outpatient lactation consultant within a week of discharge for support with early term infant, and weaning from pumping after supply established. Family plans to follow up with [facility].           Hazel Garcia RN, IBCLC   Lactation Consultant  [place]: [facility] [phone]  Office: [phone]

## 2024-06-15 ENCOUNTER — MYC MEDICAL ADVICE (OUTPATIENT)
Dept: OBGYN | Facility: CLINIC | Age: 34
End: 2024-06-15
Payer: COMMERCIAL

## 2024-07-24 ENCOUNTER — MYC MEDICAL ADVICE (OUTPATIENT)
Dept: FAMILY MEDICINE | Facility: CLINIC | Age: 34
End: 2024-07-24
Payer: COMMERCIAL

## 2024-07-25 ENCOUNTER — PRENATAL OFFICE VISIT (OUTPATIENT)
Dept: OBGYN | Facility: CLINIC | Age: 34
End: 2024-07-25
Attending: ADVANCED PRACTICE MIDWIFE
Payer: COMMERCIAL

## 2024-07-25 VITALS
HEART RATE: 75 BPM | DIASTOLIC BLOOD PRESSURE: 67 MMHG | HEIGHT: 64 IN | SYSTOLIC BLOOD PRESSURE: 101 MMHG | BODY MASS INDEX: 26.27 KG/M2 | WEIGHT: 153.9 LBS

## 2024-07-25 DIAGNOSIS — Z91.89 BREASTFEEDING PROBLEM: ICD-10-CM

## 2024-07-25 PROCEDURE — 99207 PR POST PARTUM EXAM: CPT | Performed by: OBSTETRICS & GYNECOLOGY

## 2024-07-25 PROCEDURE — 99213 OFFICE O/P EST LOW 20 MIN: CPT | Performed by: OBSTETRICS & GYNECOLOGY

## 2024-07-25 ASSESSMENT — EDINBURGH POSTNATAL DEPRESSION SCALE (EPDS)
THINGS HAVE BEEN GETTING ON TOP OF ME: NO, MOST OF THE TIME I HAVE COPED QUITE WELL
I HAVE BLAMED MYSELF UNNECESSARILY WHEN THINGS WENT WRONG: NOT VERY OFTEN
I HAVE FELT SCARED OR PANICKY FOR NO GOOD REASON: NO, NOT AT ALL
I HAVE BLAMED MYSELF UNNECESSARILY WHEN THINGS WENT WRONG: NOT VERY OFTEN
I HAVE BEEN SO UNHAPPY THAT I HAVE HAD DIFFICULTY SLEEPING: YES, MOST OF THE TIME
I HAVE BEEN ANXIOUS OR WORRIED FOR NO GOOD REASON: HARDLY EVER
THE THOUGHT OF HARMING MYSELF HAS OCCURRED TO ME: NEVER
I HAVE BEEN SO UNHAPPY THAT I HAVE BEEN CRYING: ONLY OCCASIONALLY
TOTAL SCORE: 8
I HAVE BEEN ANXIOUS OR WORRIED FOR NO GOOD REASON: HARDLY EVER
I HAVE BEEN ABLE TO LAUGH AND SEE THE FUNNY SIDE OF THINGS: AS MUCH AS I ALWAYS COULD
I HAVE FELT SAD OR MISERABLE: NOT VERY OFTEN
I HAVE BEEN ABLE TO LAUGH AND SEE THE FUNNY SIDE OF THINGS: AS MUCH AS I ALWAYS COULD
I HAVE BEEN SO UNHAPPY THAT I HAVE BEEN CRYING: ONLY OCCASIONALLY
I HAVE FELT SCARED OR PANICKY FOR NO GOOD REASON: NO, NOT AT ALL
I HAVE FELT SAD OR MISERABLE: NOT VERY OFTEN
THE THOUGHT OF HARMING MYSELF HAS OCCURRED TO ME: NEVER
THINGS HAVE BEEN GETTING ON TOP OF ME: NO, MOST OF THE TIME I HAVE COPED QUITE WELL
I HAVE LOOKED FORWARD WITH ENJOYMENT TO THINGS: AS MUCH AS I EVER DID
I HAVE LOOKED FORWARD WITH ENJOYMENT TO THINGS: AS MUCH AS I EVER DID
I HAVE BEEN SO UNHAPPY THAT I HAVE HAD DIFFICULTY SLEEPING: YES, MOST OF THE TIME

## 2024-07-25 NOTE — PATIENT INSTRUCTIONS
Thank you for trusting us with your care!   Please be aware, if you are on Mychart, you may see your results prior to your providers review. If labs are abnormal, we will call or message you on [employer] with a follow up plan.    If you need to contact us for questions about:  Symptoms, Scheduling & Medical Questions; Non-urgent (2-3 day response) Qubell message, Urgent (needing response today) [phone] (if after 3:30pm next day response)   Prescriptions: Please call your Pharmacy   Billing: [place] [phone] or [facility]:[phone]

## 2024-07-25 NOTE — NURSING NOTE
Chief Complaint   Patient presents with    Post Partum Exam     6 weeks pp     SUBJECTIVE:   Hakeem Meade is here for her 6-week postpartum checkup.     Teachey score: 8  Hx of Abuse:  No    Delivery Date: 2024.    Delivering provider:  Halina Ellison MD.    Type of delivery:  .  Perineum:  tear, with repair.     Delivery complications: category II FHT immediately prior to delivery with pushing   Infant gender:  boy, weight 7 pounds 8.6 oz.  Feeding Method:  .  Complications reported with feeding:  none, infant thriving  Bleeding:  None.  Duration:  N/A.  Menses resumed:  No  Bowel/Urinary problems:  No    Contraception Planned:  Would like to discuss with provider  She  has not had intercourse since delivery..

## 2024-07-25 NOTE — PROGRESS NOTES
"Nursing Notes:   Sun Seo MA  2024  3:54 PM  Signed  Chief Complaint   Patient presents with    Post Partum Exam     6 weeks pp     SUBJECTIVE:   Hakeem Meade is here for her 6-week postpartum checkup.     Pompeii score: 8  Hx of Abuse:  No    Delivery Date: 2024.    Delivering provider:  Halina Ellison MD.    Type of delivery:  .  Perineum:  tear, with repair.     Delivery complications: category II FHT immediately prior to delivery with pushing   Infant gender:  boy, weight 7 pounds 8.6 oz.  Feeding Method:  .  Complications reported with feeding:  none, infant thriving  Bleeding:  None.  Duration:  N/A.  Menses resumed:  No  Bowel/Urinary problems:  No    Contraception Planned:  Would like to discuss with provider  She  has not had intercourse since delivery..         ================================================================  ROS: 10 point ROS neg other than the symptoms noted above in the HPI.   Having issues with nipple itching, excoriation and drainage. Similar issue last pregnancy, used apno with good relief.     EXAM:  /67   Pulse 75   Ht 1.626 m (5' 4\")   Wt 69.8 kg (153 lb 14.4 oz)   LMP 2023 (Exact Date)   Breastfeeding Yes   BMI 26.42 kg/m      General: healthy, alert, and no distress  Psych: NEGATIVE  Last PHQ-9 score on record= No Value exists for the : HP#PHQ9  Breasts:  Lactating, No S/S of yeast or mastitis, and excoriation, with open sore  Abdomen: Benign, Soft, flat, non-tender, No masses, organomegaly, and Diastasis less than 1-2 FB  Vulva:  Normal genitalia and Bartholin's, Urethra, Duran's normal  Vagina:  normal with good muscle tone  Cervix:  no lesions.    Uterus:  fully involuted and non-tender    Adnexa:  Within normal limits and No masses, nodularity, tenderness  Recto-vaginal:   anus normal    ASSESSMENT:   Encounter Diagnoses   Name Primary?    Breastfeeding problem Yes    Postpartum care and examination of lactating mother     "   Normal postpartum exam after   Pregnancy was complicated by:  none.      PLAN:     Orders Placed This Encounter   Medications    APNO CREA ointment     Sig: Apply to affected area as needed     Dispense:  30 g     Refill:  1        Contraception methods discussed- considering Mirena IUD  Exercise encouraged  Follow up in 1 year    Tomeka Salazar MD

## 2024-07-25 NOTE — TELEPHONE ENCOUNTER
*See [employer] Message*    Called, patient would like same Rx Katie gave last time for these symptoms sent to pharmacy. Let me know if this patient needs an appointment.

## 2024-08-30 ENCOUNTER — MYC MEDICAL ADVICE (OUTPATIENT)
Dept: FAMILY MEDICINE | Facility: CLINIC | Age: 34
End: 2024-08-30
Payer: COMMERCIAL

## 2024-08-30 DIAGNOSIS — L30.9 ECZEMA, UNSPECIFIED TYPE: ICD-10-CM

## 2024-09-03 RX ORDER — TRIAMCINOLONE ACETONIDE 1 MG/G
CREAM TOPICAL 2 TIMES DAILY
Qty: 454 G | Refills: 0 | Status: SHIPPED | OUTPATIENT
Start: 2024-09-03

## 2024-09-16 DIAGNOSIS — Z91.89 BREASTFEEDING PROBLEM: ICD-10-CM

## 2024-10-01 ENCOUNTER — MYC MEDICAL ADVICE (OUTPATIENT)
Dept: FAMILY MEDICINE | Facility: CLINIC | Age: 34
End: 2024-10-01
Payer: COMMERCIAL

## 2024-10-09 ENCOUNTER — ALLIED HEALTH/NURSE VISIT (OUTPATIENT)
Dept: FAMILY MEDICINE | Facility: CLINIC | Age: 34
End: 2024-10-09
Payer: COMMERCIAL

## 2024-10-09 DIAGNOSIS — Z23 ENCOUNTER FOR IMMUNIZATION: Primary | ICD-10-CM

## 2024-10-09 PROCEDURE — 90656 IIV3 VACC NO PRSV 0.5 ML IM: CPT

## 2024-10-09 PROCEDURE — 99207 PR NO CHARGE NURSE ONLY: CPT

## 2024-10-09 PROCEDURE — 90471 IMMUNIZATION ADMIN: CPT

## 2024-10-09 NOTE — PROGRESS NOTES
Prior to immunization administration, verified patients identity using patient s name and date of birth. Please see Immunization Activity for additional information.     Screening Questionnaire for Adult Immunization    Are you sick today?   No   Do you have allergies to medications, food, a vaccine component or latex?   No   Have you ever had a serious reaction after receiving a vaccination?   No   Do you have a long-term health problem with heart, lung, kidney, or metabolic disease (e.g., diabetes), asthma, a blood disorder, no spleen, complement component deficiency, a cochlear implant, or a spinal fluid leak?  Are you on long-term aspirin therapy?   No   Do you have cancer, leukemia, HIV/AIDS, or any other immune system problem?   No   Do you have a parent, brother, or sister with an immune system problem?   No   In the past 3 months, have you taken medications that affect  your immune system, such as prednisone, other steroids, or anticancer drugs; drugs for the treatment of rheumatoid arthritis, Crohn s disease, or psoriasis; or have you had radiation treatments?   No   Have you had a seizure, or a brain or other nervous system problem?   No   During the past year, have you received a transfusion of blood or blood    products, or been given immune (gamma) globulin or antiviral drug?   No   For women: Are you pregnant or is there a chance you could become       pregnant during the next month?   No   Have you received any vaccinations in the past 4 weeks?   No     Immunization questionnaire answers were all negative.    I have reviewed the following standing orders:   This patient is due and qualifies for the Influenza vaccine.    Click here for Influenza Vaccine Standing Order    I have reviewed the vaccines inclusion and exclusion criteria; No concerns regarding eligibility.     Patient instructed to remain in clinic for 15 minutes afterwards, and to report any adverse reactions.     Screening performed by  ROEL PLAZA RN on 10/9/2024 at 9:49 AM.

## 2025-06-09 ENCOUNTER — OFFICE VISIT (OUTPATIENT)
Dept: FAMILY MEDICINE | Facility: CLINIC | Age: 35
End: 2025-06-09
Payer: COMMERCIAL

## 2025-06-09 VITALS
RESPIRATION RATE: 18 BRPM | WEIGHT: 151 LBS | HEART RATE: 82 BPM | HEIGHT: 64 IN | BODY MASS INDEX: 25.78 KG/M2 | DIASTOLIC BLOOD PRESSURE: 62 MMHG | OXYGEN SATURATION: 98 % | TEMPERATURE: 98 F | SYSTOLIC BLOOD PRESSURE: 98 MMHG

## 2025-06-09 DIAGNOSIS — Z13.1 SCREENING FOR DIABETES MELLITUS: ICD-10-CM

## 2025-06-09 DIAGNOSIS — Z30.09 ENCOUNTER FOR OTHER GENERAL COUNSELING AND ADVICE ON CONTRACEPTION: ICD-10-CM

## 2025-06-09 DIAGNOSIS — Z00.00 ROUTINE GENERAL MEDICAL EXAMINATION AT A HEALTH CARE FACILITY: Primary | ICD-10-CM

## 2025-06-09 PROCEDURE — 99395 PREV VISIT EST AGE 18-39: CPT | Performed by: NURSE PRACTITIONER

## 2025-06-09 PROCEDURE — 3074F SYST BP LT 130 MM HG: CPT | Performed by: NURSE PRACTITIONER

## 2025-06-09 PROCEDURE — 3078F DIAST BP <80 MM HG: CPT | Performed by: NURSE PRACTITIONER

## 2025-06-09 PROCEDURE — 1125F AMNT PAIN NOTED PAIN PRSNT: CPT | Performed by: NURSE PRACTITIONER

## 2025-06-09 SDOH — HEALTH STABILITY: PHYSICAL HEALTH: ON AVERAGE, HOW MANY DAYS PER WEEK DO YOU ENGAGE IN MODERATE TO STRENUOUS EXERCISE (LIKE A BRISK WALK)?: 3 DAYS

## 2025-06-09 SDOH — HEALTH STABILITY: PHYSICAL HEALTH: ON AVERAGE, HOW MANY MINUTES DO YOU ENGAGE IN EXERCISE AT THIS LEVEL?: 20 MIN

## 2025-06-09 ASSESSMENT — SOCIAL DETERMINANTS OF HEALTH (SDOH): HOW OFTEN DO YOU GET TOGETHER WITH FRIENDS OR RELATIVES?: MORE THAN THREE TIMES A WEEK

## 2025-06-09 ASSESSMENT — PAIN SCALES - GENERAL: PAINLEVEL_OUTOF10: MILD PAIN (1)

## 2025-06-09 NOTE — PATIENT INSTRUCTIONS
Advanced Care Directive resources  Consider making an advanced care directive - this is a good site for assistance and resources   [email or web address]    Check to see if you have had immunizations for Hepatitis B and Hepatitis A    Return for fasting labs    Ideally no unprotected sex for two weeks prior to IUD, will need to come in with a full bladder so we can do a UPT upon arrival, and can take up to 600 mg of ibuprofen

## 2025-06-09 NOTE — PROGRESS NOTES
"Preventive Care Visit  [facility]  MAMADOU Horvath CNP, Nurse Practitioner - Family  Jun 9, 2025      Assessment & Plan     Routine general medical examination at a health care facility  Reviewed and updated health maintenance and recommendations  Pap next year  Defer cholesterol until no longer nursing    Screening for diabetes mellitus  Return when fasting  - Glucose; Future    Encounter for other general counseling and advice on contraception  Discussed combined oral contraceptive, NuvaRing, patch, POP, Nexplanon, progesterone and copper IUDs and diaphragm. She is most interested in Mirena IUD at this time and assisted to schedule. Discussed merits, risks, possible side effects, insertion procedure, pain management for procedure and instructions for no unprotected sex within two weeks. She declines diazepam or vicodin at this time, but can contact me if she changes her mind.    Patient has been advised of split billing requirements and indicates understanding: Yes        BMI  Estimated body mass index is 25.92 kg/m  as calculated from the following:    Height as of this encounter: 1.626 m (5' 4\").    Weight as of this encounter: 68.5 kg (151 lb).   Weight management plan: Discussed healthy diet and exercise guidelines    Counseling  Appropriate preventive services were addressed with this patient via screening, questionnaire, or discussion as appropriate for fall prevention, nutrition, physical activity, Tobacco-use cessation, social engagement, weight loss and cognition.  Checklist reviewing preventive services available has been given to the patient.  Reviewed patient's diet, addressing concerns and/or questions.   She is at risk for lack of exercise and has been provided with information to increase physical activity for the benefit of her well-being.       Follow-up   Return in about 1 year (around 6/9/2026) for Physical Exam.        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    Advance Care Planning    Discussed advance care planning with patient; informed AVS has link to [facility].        6/9/2025   General Health   How would you rate your overall physical health? Good   Feel stress (tense, anxious, or unable to sleep) Only a little   (!) STRESS CONCERN      6/9/2025   Nutrition   Three or more servings of calcium each day? Yes   Diet: Regular (no restrictions)   How many servings of fruit and vegetables per day? (!) 2-3   How many sweetened beverages each day? 0-1         6/9/2025   Exercise   Days per week of moderate/strenous exercise 3 days   Average minutes spent exercising at this level 20 min         6/9/2025   Social Factors   Frequency of gathering with friends or relatives More than three times a week   Worry food won't last until get money to buy more No   Food not last or not have enough money for food? No   Do you have housing? (Housing is defined as stable permanent housing and does not include staying outside in a car, in a tent, in an abandoned building, in an overnight shelter, or couch-surfing.) Yes   Are you worried about losing your housing? No   Lack of transportation? No   Unable to get utilities (heat,electricity)? No         6/9/2025   Dental   Dentist two  times every year? Yes         Today's PHQ-2 Score:       6/9/2025     1:12 PM   PHQ-2 ( 1999 [employer])   Q1: Little interest or pleasure in doing things 0   Q2: Feeling down, depressed or hopeless 0   PHQ-2 Score 0    Q1: Little interest or pleasure in doing things Not at all   Q2: Feeling down, depressed or hopeless Not at all   PHQ-2 Score 0       Patient-reported           6/9/2025   Substance Use   Alcohol more than 3/day or more than 7/wk No   Do you use any other substances recreationally? No     Social History     Tobacco Use    Smoking status: Never    Smokeless tobacco: Never   Vaping Use    Vaping status: Never Used   Substance Use Topics    Alcohol use: No    Drug use: No          Mammogram Screening - Patient under 40 years of age: Routine Mammogram Screening not recommended.         6/9/2025   STI Screening   New sexual partner(s) since last STI/HIV test? No     History of abnormal Pap smear: No - age 30- 64 PAP with HPV every 5 years recommended        Latest Ref Rng & Units 2/15/2021    12:03 PM 2/12/2021     3:00 PM 4/13/2018    12:00 AM   PAP / HPV   PAP (Historical)  NIL   wnl   in careeverywhere    HPV 16 DNA NEG^Negative  Negative     HPV 18 DNA NEG^Negative  Negative     Other HR HPV NEG^Negative  Negative             6/9/2025   Contraception/Family Planning   Questions about contraception or family planning (!) YES         Reviewed and updated as needed this visit by Provider   Tobacco     Med Hx  Surg Hx  Fam Hx  Soc Hx Sexual Activity          Past Medical History:   Diagnosis Date    Varicella      Past Surgical History:   Procedure Laterality Date    C EACH ADD TOOTH EXTRACTION      DILATION AND EVACUATION N/A 01/04/2019    Procedure: DILATION AND EVACUATION;  Surgeon: Meghana Hoyos MD;  Location: [facility] OR    FOOT SURGERY      GUM SURGERY  2023    oral surgery         Review of Systems  Constitutional, neuro, ENT, endocrine, pulmonary, cardiac, gastrointestinal,  "genitourinary, musculoskeletal, integument and psychiatric systems are negative, except as otherwise noted.     Objective    Exam  BP 98/62   Pulse 82   Temp 98  F (36.7  C) (Temporal)   Resp 18   Ht 1.626 m (5' 4\")   Wt 68.5 kg (151 lb)   LMP 04/25/2025 (Approximate)   SpO2 98%   Breastfeeding Yes   BMI 25.92 kg/m     Estimated body mass index is 25.92 kg/m  as calculated from the following:    Height as of this encounter: 1.626 m (5' 4\").    Weight as of this encounter: 68.5 kg (151 lb).    Physical Exam  GENERAL: alert and no distress  EYES: Eyes grossly normal to inspection, PERRL and conjunctivae and sclerae normal  HENT: ear canals and TM's normal, nose and mouth without ulcers or lesions  NECK: no adenopathy, no asymmetry, masses, or scars  RESP: lungs clear to auscultation - no rales, rhonchi or wheezes  BREAST: normal without masses, tenderness or nipple discharge and no palpable axillary masses or adenopathy  CV: regular rate and rhythm, normal S1 S2, no S3 or S4, no murmur, click or rub, no peripheral edema  ABDOMEN: soft, nontender, no hepatosplenomegaly, no masses and bowel sounds normal  MS: no gross musculoskeletal defects noted, no edema  SKIN: no suspicious lesions or rashes  NEURO: Normal strength and tone, mentation intact and speech normal  PSYCH: mentation appears normal, affect normal/bright        Signed Electronically by: MAMADOU Horvath CNP    "

## 2025-08-01 ENCOUNTER — RESULTS FOLLOW-UP (OUTPATIENT)
Dept: FAMILY MEDICINE | Facility: CLINIC | Age: 35
End: 2025-08-01

## (undated) DEVICE — PAD CHUX UNDERPAD 30X36" P3036C

## (undated) DEVICE — DRAPE POUCH IRR 1016

## (undated) DEVICE — DECANTER TRANSFER DEVICE 2008S

## (undated) DEVICE — SPECIMEN TRAP VACUUM SUCTION SAFETOUCH 003853-902

## (undated) DEVICE — SOL NACL 0.9% IRRIG 1000ML BOTTLE 2F7124

## (undated) DEVICE — SYR 01ML 27GA 0.5" NDL TBC 309623

## (undated) DEVICE — SUCTION CANNULA UTERINE 11MM CVD 21554

## (undated) DEVICE — GLOVE PROTEXIS W/NEU-THERA 6.5  2D73TE65

## (undated) DEVICE — Device

## (undated) DEVICE — TUBING VACUUM COLLECTION 6FT 23116

## (undated) DEVICE — SUCTION VACUUM CANISTER STANDARD W/LID&CAPS 003987-901

## (undated) DEVICE — SUCTION CANNULA UTERINE 14MM CVD 21593A

## (undated) DEVICE — TUBING VACUUM COLLECTION SET LG 1/2"X6' BKT-506

## (undated) DEVICE — PEN MARKING SKIN W/LABELS 31145918

## (undated) DEVICE — SOL WATER IRRIG 1000ML BOTTLE 2F7114

## (undated) DEVICE — DRAPE UNDER BUTTOCK 8483

## (undated) DEVICE — LINEN TOWEL PACK X5 5464

## (undated) DEVICE — STRAP KNEE/BODY 31143004

## (undated) DEVICE — SPONGE RAY-TEC 4X8" 7318

## (undated) DEVICE — LINEN GOWN X4 5410

## (undated) DEVICE — GLOVE PROTEXIS BLUE W/NEU-THERA 7.0  2D73EB70

## (undated) DEVICE — DRAPE ISOLATION BAG 1003

## (undated) RX ORDER — FENTANYL CITRATE 50 UG/ML
INJECTION, SOLUTION INTRAMUSCULAR; INTRAVENOUS
Status: DISPENSED
Start: 2019-01-04

## (undated) RX ORDER — ONDANSETRON 2 MG/ML
INJECTION INTRAMUSCULAR; INTRAVENOUS
Status: DISPENSED
Start: 2019-01-04

## (undated) RX ORDER — PROPOFOL 10 MG/ML
INJECTION, EMULSION INTRAVENOUS
Status: DISPENSED
Start: 2019-01-04

## (undated) RX ORDER — LIDOCAINE HYDROCHLORIDE 20 MG/ML
INJECTION, SOLUTION EPIDURAL; INFILTRATION; INTRACAUDAL; PERINEURAL
Status: DISPENSED
Start: 2019-01-04